# Patient Record
Sex: FEMALE | Race: WHITE | NOT HISPANIC OR LATINO | Employment: FULL TIME | URBAN - METROPOLITAN AREA
[De-identification: names, ages, dates, MRNs, and addresses within clinical notes are randomized per-mention and may not be internally consistent; named-entity substitution may affect disease eponyms.]

---

## 2017-04-03 RX ORDER — ATENOLOL 50 MG/1
50 TABLET ORAL DAILY
Qty: 90 TABLET | Refills: 1 | Status: SHIPPED | OUTPATIENT
Start: 2017-04-03 | End: 2018-01-15

## 2017-05-30 ENCOUNTER — TELEPHONE (OUTPATIENT)
Dept: ENDOCRINOLOGY | Facility: CLINIC | Age: 27
End: 2017-05-30

## 2017-06-30 ENCOUNTER — OFFICE VISIT (OUTPATIENT)
Dept: ENDOCRINOLOGY | Facility: CLINIC | Age: 27
End: 2017-06-30

## 2017-06-30 VITALS
BODY MASS INDEX: 27.05 KG/M2 | HEIGHT: 62 IN | SYSTOLIC BLOOD PRESSURE: 108 MMHG | HEART RATE: 56 BPM | OXYGEN SATURATION: 99 % | WEIGHT: 147 LBS | DIASTOLIC BLOOD PRESSURE: 64 MMHG

## 2017-06-30 DIAGNOSIS — E05.90 HYPERTHYROIDISM: Primary | ICD-10-CM

## 2017-06-30 DIAGNOSIS — D70.2 OTHER DRUG-INDUCED NEUTROPENIA (HCC): ICD-10-CM

## 2017-06-30 PROCEDURE — 99213 OFFICE O/P EST LOW 20 MIN: CPT | Performed by: INTERNAL MEDICINE

## 2017-06-30 NOTE — PROGRESS NOTES
Becky Naqvi 27 y.o.  CC:Follow Up; Hyperthyroidism   [place]: Follow-up (hyperthyroidism, leukopenia, anemia)    Reviewed lab work- t4 0.89  tsh 0.05  Low white cell count previously- today 5.3 with 50% neutrophils   cmp normal   Planning preg - discussed stopping medication if considering conception     Allergies   Allergen Reactions   • Oxycodone-Acetaminophen    • Propylthiouracil      Neutropenia       Current Outpatient Prescriptions:   •  atenolol (TENORMIN) 50 MG tablet, Take 1 tablet by mouth Daily., Disp: 90 tablet, Rfl: 1  •  methimazole (TAPAZOLE) 5 MG tablet, Take 1 tablet twice daily, Disp: 180 tablet, Rfl: 1  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]     Overview Note:     Impression: 12/08/2015 - check tfts  Impression: 09/15/2015 - stable exam  Impression: 07/17/2015 - check tsi  Impression: 05/29/2015 - check TSI  Impression: 04/16/2015 - check tsi   discussed options for treatment   will continue with methimazole katya;      • Hyperthyroidism [E05.90]     Overview Note:     Impression: 12/08/2015 - get tfts  Impression: 09/15/2015 - improving tsh, low t3 and t4  cbc, and cmp are normal  continue 2.5 mg of methimazole daily   repeat tsh in 4-6 weeks - gave order  reviewed lab work from Dr Doss  Impression: 07/17/2015 - check all levels   discussed therapy at 14 weeks  Impression: 06/12/2015 - check tfts, ab and hcg  Impression: 05/29/2015 - update cbc, cmp, tfts, tsi (baseline)  Impression: 04/16/2015 - check free t4, free t3 and tsh, tsi  Impression: 12/30/2014 - check thyroid hormone levels, repeat tsh and check thyroid antibodies  discussed ddx   discussed treatment options for graves dz  await blood work - use ptu in light of poss  fertility;      • Pain in joint [M25.50]     Overview Note:     Impression: 12/30/2014 - diffuse, intermittent, with am stiffness  check darcy, rf and esr;      • Leukopenia [D72.819]     Overview Note:     Impression: 12/08/2015 - check cbc  Impression: 05/29/2015 - assoc with anti thyroid medications  Impression: 04/16/2015 - check cbc- so far tolerating well;      • Infectious diarrhea [A09]     Overview Note:     Impression: 03/03/2016 - no s/p antbiotics, has colonoscopy set up    no further diarrhea;      • Back pain [M54.9]   • Seasonal allergic rhinitis [J30.2]     Review of Systems   Constitutional: Negative for activity change, appetite change, chills, diaphoresis, fatigue, fever and unexpected weight change.   HENT: Negative for congestion, dental problem, drooling, ear discharge, ear pain, facial swelling, hearing loss, mouth sores, nosebleeds, postnasal drip, rhinorrhea, sinus pressure, sneezing, sore throat, tinnitus, trouble swallowing and voice change.    Eyes: Negative for photophobia, pain, discharge, redness, itching and visual disturbance.   Respiratory: Negative for apnea, cough, choking, chest tightness, shortness of breath, wheezing and stridor.    Cardiovascular: Negative for chest pain, palpitations and leg swelling.   Gastrointestinal: Negative for abdominal distention, abdominal pain, anal bleeding, blood in stool, constipation, diarrhea, nausea, rectal pain and vomiting.   Endocrine: Negative for cold intolerance, heat intolerance, polydipsia, polyphagia and polyuria.   Genitourinary: Negative for decreased urine volume, difficulty urinating, dysuria, enuresis, flank pain, frequency, genital sores, hematuria and urgency.   Musculoskeletal: Negative for arthralgias, back pain, gait problem, joint swelling, myalgias, neck pain and neck stiffness.   Skin: Negative for color change, pallor, rash and wound.   Allergic/Immunologic: Negative for environmental allergies, food allergies and  "immunocompromised state.   Neurological: Negative for dizziness, tremors, seizures, syncope, facial asymmetry, speech difficulty, weakness, light-headedness, numbness and headaches.   Hematological: Negative for adenopathy. Does not bruise/bleed easily.   Psychiatric/Behavioral: Negative for agitation, behavioral problems, confusion, decreased concentration, dysphoric mood, hallucinations, self-injury, sleep disturbance and suicidal ideas. The patient is not nervous/anxious and is not hyperactive.      Social History     Social History   • Marital status:      Spouse name: N/A   • Number of children: N/A   • Years of education: N/A     Occupational History   • Not on file.     Social History Main Topics   • Smoking status: Never Smoker   • Smokeless tobacco: Never Used   • Alcohol use Yes      Comment: occasionally   • Drug use: No   • Sexual activity: Defer     Other Topics Concern   • Not on file     Social History Narrative     Family History   Problem Relation Age of Onset   • Arthritis Other    • Hyperlipidemia Other    • Thyroid disease Other    • Heart attack Other    • Hypertension Other    • Lung cancer Other    • Lung cancer Other    • Hypertension Father    • Lung cancer Paternal Uncle    • Heart attack Maternal Grandmother    • Lung cancer Paternal Grandmother      /64  Pulse 56  Ht 62\" (157.5 cm)  Wt 147 lb (66.7 kg)  SpO2 99%  BMI 26.89 kg/m2  Physical Exam   Constitutional: She is oriented to person, place, and time. She appears well-developed and well-nourished.   HENT:   Head: Normocephalic and atraumatic.   Nose: Nose normal.   Mouth/Throat: Oropharynx is clear and moist.   Eyes: Conjunctivae, EOM and lids are normal. Pupils are equal, round, and reactive to light.   Neck: Trachea normal and normal range of motion. Neck supple. Carotid bruit is not present. No tracheal deviation present. No thyroid mass and no thyromegaly (small goiter without dom nodule) present. "   Cardiovascular: Normal rate, regular rhythm, normal heart sounds and intact distal pulses.  Exam reveals no gallop and no friction rub.    No murmur heard.  Pulmonary/Chest: Effort normal and breath sounds normal. No respiratory distress. She has no wheezes.   Musculoskeletal: Normal range of motion. She exhibits no edema or deformity.   Lymphadenopathy:     She has no cervical adenopathy.   Neurological: She is alert and oriented to person, place, and time. She has normal reflexes. She displays normal reflexes. No cranial nerve deficit.   Skin: Skin is warm and dry. No rash noted. No cyanosis or erythema. Nails show no clubbing.   Psychiatric: She has a normal mood and affect. Her speech is normal and behavior is normal. Judgment and thought content normal. Cognition and memory are normal.   Nursing note and vitals reviewed.    Results for orders placed or performed during the hospital encounter of 03/03/16   T4, free   Result Value Ref Range    Free T4 0.88 (L) 0.89 - 1.76 ng/dL   TSH   Result Value Ref Range    TSH 0.272 (L) 0.350 - 5.350 UIU/mL   Comprehensive metabolic panel   Result Value Ref Range    Glucose 83 70 - 100 mg/dL    BUN 9 9 - 23 mg/dL    Creatinine 0.8 0.6 - 1.3 mg/dL    Sodium 140 132 - 146 mmol/L    Potassium 4.6 3.5 - 5.5 mmol/L    Chloride 107 99 - 109 mmol/L    CO2 31 20 - 31 mmol/L    Calcium 9.3 8.7 - 10.4 mg/dL    Alkaline Phosphatase 80 25 - 100 Units/L    AST (SGOT) 24 0 - 33 Units/L    ALT (SGPT) 27 7 - 40 Units/L    Total Bilirubin 0.5 0.3 - 1.2 mg/dL    Total Protein 7.2 5.7 - 8.2 g/dL    Albumin 4.3 3.2 - 4.8 g/dL    eGFR 99 ml/min/1.732    Anion Gap 2 (L) 3 - 11 mmol/L   Thyroid stimulating immunoglobulin   Result Value Ref Range    Thyroid Stimulating Immunoglobulin 33 0 - 139 %   CBC and Differential   Result Value Ref Range    WBC 4.97 3.50 - 10.80 K/mcL    RBC 4.36 3.89 - 5.14 M/mcL    Hemoglobin 11.9 11.5 - 15.5 g/dL    Hematocrit 36.6 34.5 - 44.0 %    MCV 83.9 80.0 - 99.0  fL    MCH 27.3 27.0 - 31.0 pg    MCHC 32.5 32.0 - 36.0 g/dL    RDW-CV 13.2 11.3 - 14.5 %    Platelets 238 150 - 450 K/mcL    Neutrophils Absolute 2.60 1.50 - 8.30 K/mcL    Lymphocytes Absolute 1.69 0.60 - 4.80 K/mcL    Monocytes Absolute 0.47 0.00 - 1.00 K/mcL    Eosinophils Absolute 0.16 0.10 - 0.30 K/mcL    Basophils Absolute 0.04 0.00 - 0.20 K/mcL    Neutrophil Rel % 52.3 41.0 - 71.0 %    Lymphocyte Rel % 34.0 24.0 - 44.0 %    Monocyte Rel % 9.5 0.0 - 12.0 %    Eosinophil Rel % 3.2 (H) 0.0 - 3.0 %    Basophil Rel % 0.8 0.0 - 1.0 %    Immature Granulocyte Rel % 0.2 0.0 - 0.6 %     Problem List Items Addressed This Visit        Endocrine    Hyperthyroidism - Primary     Reviewed lab work - t4 0.89 and tsh 0.08  No change   t4 is down from 1.11 previously  Discussed with her   Continue methimazole 10 mg daily  lfts and white cell counts are stable            Immune and Lymphatic    Leukopenia     Reviewed cbc - outside lab work   No change for now              Discussed holding medication 2 weeks prior to period every month, ok to restart methimazole once period starts up to ovulation/ mid month  F/u lab work 8 weeks   F/u here 6 months    Tawana Causey  Signed Marni Pan MD

## 2017-07-27 ENCOUNTER — TELEPHONE (OUTPATIENT)
Dept: INTERNAL MEDICINE | Facility: CLINIC | Age: 27
End: 2017-07-27

## 2017-07-27 NOTE — TELEPHONE ENCOUNTER
DALI,    MS EASLEY CALLED STATING THAT SHE WILL NOT BE ABLE TO COME IN FOR HER APPT 7/28 AT 11:00 AM. SHE WOULD LIKE A RETURN CALL FROM YOU TO SEE WHEN DR HAYES CAN GET HER IN. I DID NOT CANCEL THIS APPT PENDING THE OUTCOME OF YOUR CONVERSATION WITH HER. SHE IS TO SEE DR HAYES ONCE SHE HAD A POSITIVE PREGNANCY TEST.    [phone]

## 2017-07-28 ENCOUNTER — TELEPHONE (OUTPATIENT)
Dept: INTERNAL MEDICINE | Facility: CLINIC | Age: 27
End: 2017-07-28

## 2017-07-28 DIAGNOSIS — E05.90 HYPERTHYROIDISM: Primary | ICD-10-CM

## 2017-07-28 NOTE — TELEPHONE ENCOUNTER
PT CALLED AGAIN STATING SHE IS UNABLE TO MAKE IT TODAY FOR HER AN APPOINTMENT SHE WAS WANTING TO BE RESCHEDULED, SHE ALSO WANTED TO KNOW IF DR. HAYES JUST NEEDED LABS DONE OR IF IT WAS IMPORTANT SHE COME IN TODAY.

## 2017-07-28 NOTE — TELEPHONE ENCOUNTER
Order created for lab work - please have her call once she has this done.  Reschedule for 8 weeks.  Thanks, [facility]    Please advise on OV or labs?

## 2017-07-28 NOTE — TELEPHONE ENCOUNTER
Patient was advised with Dr Pan response and she states she will come into this office within the next week for the labs

## 2017-07-28 NOTE — TELEPHONE ENCOUNTER
Duplicate message  Pt was advised I do have a message into Dr Pan to ask if she needs OV or just labs due to pregnancy and advised pt I will call her as soon as I have a response  Pt voiced understanding

## 2017-08-08 LAB
T4 FREE SERPL-MCNC: 1.28 NG/DL (ref 0.89–1.76)
TSH SERPL DL<=0.05 MIU/L-ACNC: 0.2 MIU/ML (ref 0.35–5.35)

## 2017-08-08 PROCEDURE — 84439 ASSAY OF FREE THYROXINE: CPT | Performed by: INTERNAL MEDICINE

## 2017-08-08 PROCEDURE — 84445 ASSAY OF TSI GLOBULIN: CPT | Performed by: INTERNAL MEDICINE

## 2017-08-08 PROCEDURE — 84443 ASSAY THYROID STIM HORMONE: CPT | Performed by: INTERNAL MEDICINE

## 2017-08-08 PROCEDURE — 84481 FREE ASSAY (FT-3): CPT | Performed by: INTERNAL MEDICINE

## 2017-08-20 LAB
T3FREE SERPL-MCNC: 3.2 PG/ML (ref 2–4.4)
TSI ACT/NOR SER: 137 % (ref 0–139)

## 2017-10-17 ENCOUNTER — TELEPHONE (OUTPATIENT)
Dept: INTERNAL MEDICINE | Facility: CLINIC | Age: 27
End: 2017-10-17

## 2017-10-17 DIAGNOSIS — E05.90 HYPERTHYROIDISM: Primary | ICD-10-CM

## 2018-01-15 ENCOUNTER — OFFICE VISIT (OUTPATIENT)
Dept: ENDOCRINOLOGY | Facility: CLINIC | Age: 28
End: 2018-01-15

## 2018-01-15 VITALS
BODY MASS INDEX: 23.9 KG/M2 | OXYGEN SATURATION: 100 % | SYSTOLIC BLOOD PRESSURE: 112 MMHG | DIASTOLIC BLOOD PRESSURE: 50 MMHG | WEIGHT: 140 LBS | HEIGHT: 64 IN | HEART RATE: 82 BPM

## 2018-01-15 DIAGNOSIS — E05.90 HYPERTHYROIDISM: Primary | ICD-10-CM

## 2018-01-15 LAB
T4 FREE SERPL-MCNC: 0.99 NG/DL (ref 0.89–1.76)
TSH SERPL DL<=0.05 MIU/L-ACNC: 0.54 MIU/ML (ref 0.35–5.35)

## 2018-01-15 PROCEDURE — 84439 ASSAY OF FREE THYROXINE: CPT | Performed by: INTERNAL MEDICINE

## 2018-01-15 PROCEDURE — 99213 OFFICE O/P EST LOW 20 MIN: CPT | Performed by: INTERNAL MEDICINE

## 2018-01-15 PROCEDURE — 84445 ASSAY OF TSI GLOBULIN: CPT | Performed by: INTERNAL MEDICINE

## 2018-01-15 PROCEDURE — 84443 ASSAY THYROID STIM HORMONE: CPT | Performed by: INTERNAL MEDICINE

## 2018-01-15 NOTE — PROGRESS NOTES
Becky Naqvi 27 y.o.  CC:Follow-up and Hyperthyroidism (with pregnancy, SHEILA 3- OB:  Emma Carney MD)      [place]: Follow-up and Hyperthyroidism (with pregnancy, SHEILA 3- OB:  Emma Carney MD)    Doing well overall  Is not currently on medication for thyroid   Reviewed tsi levels which are slightly higher (tfts normal including t4 and t3)  Will need to follow closely pp     Allergies   Allergen Reactions   • Oxycodone-Acetaminophen    • Propylthiouracil      Neutropenia     No current outpatient prescriptions on file.  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]     Overview Note:     Impression: 12/08/2015 - check tfts  Impression: 09/15/2015 - stable exam  Impression: 07/17/2015 - check tsi  Impression: 05/29/2015 - check TSI  Impression: 04/16/2015 - check tsi   discussed options for treatment   will continue with methimazole dailly;      • Hyperthyroidism [E05.90]     Overview Note:     Impression: 12/08/2015 - get tfts  Impression: 09/15/2015 - improving tsh, low t3 and t4  cbc, and cmp are normal  continue 2.5 mg of methimazole daily   repeat tsh in 4-6 weeks - gave order  reviewed lab work from Dr Doss  Impression: 07/17/2015 - check all levels   discussed therapy at 14 weeks  Impression: 06/12/2015 - check tfts, ab and hcg  Impression: 05/29/2015 - update cbc, cmp, tfts, tsi (baseline)  Impression: 04/16/2015 - check free t4, free t3 and tsh, tsi  Impression: 12/30/2014 - check thyroid hormone levels, repeat tsh and check thyroid antibodies  discussed ddx   discussed treatment options for graves dz  await blood work - use ptu in light of poss fertility;      • Pain in joint [M25.50]     Overview Note:     Impression: 12/30/2014 - diffuse,  intermittent, with am stiffness  check darcy, rf and esr;      • Leukopenia [D72.819]     Overview Note:     Impression: 12/08/2015 - check cbc  Impression: 05/29/2015 - assoc with anti thyroid medications  Impression: 04/16/2015 - check cbc- so far tolerating well;      • Infectious diarrhea [A09]     Overview Note:     Impression: 03/03/2016 - no s/p antbiotics, has colonoscopy set up    no further diarrhea;      • Back pain [M54.9]   • Seasonal allergic rhinitis [J30.2]     Review of Systems   Constitutional: Negative for activity change, appetite change, chills, diaphoresis, fatigue, fever and unexpected weight change.   HENT: Negative for congestion, dental problem, drooling, ear discharge, ear pain, facial swelling, hearing loss, mouth sores, nosebleeds, postnasal drip, rhinorrhea, sinus pressure, sneezing, sore throat, tinnitus, trouble swallowing and voice change.    Eyes: Negative for photophobia, pain, discharge, redness, itching and visual disturbance.   Respiratory: Negative for apnea, cough, choking, chest tightness, shortness of breath, wheezing and stridor.    Cardiovascular: Negative for chest pain, palpitations and leg swelling.   Gastrointestinal: Negative for abdominal distention, abdominal pain, anal bleeding, blood in stool, constipation, diarrhea, nausea, rectal pain and vomiting.   Endocrine: Negative for cold intolerance, heat intolerance, polydipsia, polyphagia and polyuria.   Genitourinary: Negative for decreased urine volume, difficulty urinating, dysuria, enuresis, flank pain, frequency, genital sores, hematuria and urgency.   Musculoskeletal: Negative for arthralgias, back pain, gait problem, joint swelling, myalgias, neck pain and neck stiffness.   Skin: Negative for color change, pallor, rash and wound.   Allergic/Immunologic: Negative for environmental allergies, food allergies and immunocompromised state.   Neurological: Negative for dizziness, tremors, seizures, syncope, facial  "asymmetry, speech difficulty, weakness, light-headedness, numbness and headaches.   Hematological: Negative for adenopathy. Does not bruise/bleed easily.   Psychiatric/Behavioral: Negative for agitation, behavioral problems, confusion, decreased concentration, dysphoric mood, hallucinations, self-injury, sleep disturbance and suicidal ideas. The patient is not nervous/anxious and is not hyperactive.      Social History     Social History   • Marital status:      Spouse name: N/A   • Number of children: N/A   • Years of education: N/A     Occupational History   • Not on file.     Social History Main Topics   • Smoking status: Never Smoker   • Smokeless tobacco: Never Used   • Alcohol use Yes      Comment: occasionally   • Drug use: No   • Sexual activity: Defer     Other Topics Concern   • Not on file     Social History Narrative     Family History   Problem Relation Age of Onset   • Arthritis Other    • Hyperlipidemia Other    • Thyroid disease Other    • Heart attack Other    • Hypertension Other    • Lung cancer Other    • Lung cancer Other    • Hypertension Father    • Lung cancer Paternal Uncle    • Heart attack Maternal Grandmother    • Lung cancer Paternal Grandmother      /50  Pulse 82  Ht 162.6 cm (64\")  Wt 63.5 kg (140 lb)  SpO2 100%  BMI 24.03 kg/m2  Physical Exam   Constitutional: She is oriented to person, place, and time. She appears well-developed and well-nourished.   HENT:   Head: Normocephalic and atraumatic.   Nose: Nose normal.   Mouth/Throat: Oropharynx is clear and moist.   Eyes: Conjunctivae, EOM and lids are normal. Pupils are equal, round, and reactive to light.   Neck: Trachea normal and normal range of motion. Neck supple. Carotid bruit is not present. No tracheal deviation present. Thyromegaly (right sided prominence without dominant nodule ) present. No thyroid mass present.   Cardiovascular: Normal rate, regular rhythm, normal heart sounds and intact distal pulses.  " Exam reveals no gallop and no friction rub.    No murmur heard.  Pulmonary/Chest: Effort normal and breath sounds normal. No respiratory distress. She has no wheezes.   Musculoskeletal: Normal range of motion. She exhibits no edema or deformity.   Lymphadenopathy:     She has no cervical adenopathy.   Neurological: She is alert and oriented to person, place, and time. She has normal reflexes. She displays normal reflexes. No cranial nerve deficit.   Skin: Skin is warm and dry. No rash noted. No cyanosis or erythema. Nails show no clubbing.   Psychiatric: She has a normal mood and affect. Her speech is normal and behavior is normal. Judgment and thought content normal. Cognition and memory are normal.   Nursing note and vitals reviewed.    Results for orders placed or performed in visit on 07/28/17   TSH   Result Value Ref Range    TSH 0.203 (L) 0.350 - 5.350 mIU/mL   T4, Free   Result Value Ref Range    Free T4 1.28 0.89 - 1.76 ng/dL   T3, Free   Result Value Ref Range    T3, Free 3.2 2.0 - 4.4 pg/mL   Thyroid Stimulating Immunoglobulin   Result Value Ref Range    Thyroid Stimulating Immunoglobulin 137 0 - 139 %     Problem List Items Addressed This Visit        Endocrine    Hyperthyroidism - Primary     Update tfts   Check tsi   F/u 8 weeks pp          Relevant Orders    TSH    T4, Free    Thyroid Stimulating Immunoglobulin        Return in about 16 weeks (around 5/7/2018) for Recheck 30 min .    Jumana Noguera MA  Signed Marni Pan MD

## 2018-01-23 LAB — TSI ACT/NOR SER: 108 % (ref 0–139)

## 2018-03-02 ENCOUNTER — TRANSCRIBE ORDERS (OUTPATIENT)
Dept: LAB | Facility: HOSPITAL | Age: 28
End: 2018-03-02

## 2018-03-02 ENCOUNTER — APPOINTMENT (OUTPATIENT)
Dept: LAB | Facility: HOSPITAL | Age: 28
End: 2018-03-02

## 2018-03-02 DIAGNOSIS — Z34.83 PRENATAL CARE, SUBSEQUENT PREGNANCY, THIRD TRIMESTER: Primary | ICD-10-CM

## 2018-03-02 PROCEDURE — 87081 CULTURE SCREEN ONLY: CPT | Performed by: OBSTETRICS & GYNECOLOGY

## 2018-03-05 LAB — BACTERIA SPEC AEROBE CULT: NORMAL

## 2018-03-22 ENCOUNTER — PREP FOR SURGERY (OUTPATIENT)
Dept: OTHER | Facility: HOSPITAL | Age: 28
End: 2018-03-22

## 2018-03-22 ENCOUNTER — ANESTHESIA (OUTPATIENT)
Dept: LABOR AND DELIVERY | Facility: HOSPITAL | Age: 28
End: 2018-03-22

## 2018-03-22 ENCOUNTER — ANESTHESIA EVENT (OUTPATIENT)
Dept: LABOR AND DELIVERY | Facility: HOSPITAL | Age: 28
End: 2018-03-22

## 2018-03-22 ENCOUNTER — HOSPITAL ENCOUNTER (INPATIENT)
Facility: HOSPITAL | Age: 28
LOS: 3 days | Discharge: HOME | End: 2018-03-25
Attending: OBSTETRICS & GYNECOLOGY | Admitting: OBSTETRICS & GYNECOLOGY

## 2018-03-22 DIAGNOSIS — Z3A.39 39 WEEKS GESTATION OF PREGNANCY: Primary | ICD-10-CM

## 2018-03-22 DIAGNOSIS — Z3A.39 39 WEEKS GESTATION OF PREGNANCY: ICD-10-CM

## 2018-03-22 PROBLEM — Z34.90 PREGNANCY: Status: ACTIVE | Noted: 2018-03-22

## 2018-03-22 LAB
ABO GROUP BLD: NORMAL
ATMOSPHERIC PRESS: ABNORMAL MMHG
ATMOSPHERIC PRESS: ABNORMAL MMHG
BASE EXCESS BLDCOA CALC-SCNC: -3.8 MMOL/L (ref 0–2)
BASE EXCESS BLDCOV CALC-SCNC: -3 MMOL/L (ref 0–2)
BDY SITE: ABNORMAL
BLD GP AB SCN SERPL QL: NEGATIVE
CO2 BLDA-SCNC: 22 MMOL/L (ref 22–33)
CO2 BLDA-SCNC: 22.7 MMOL/L (ref 22–33)
DEPRECATED RDW RBC AUTO: 42.3 FL (ref 37–54)
ERYTHROCYTE [DISTWIDTH] IN BLOOD BY AUTOMATED COUNT: 12.8 % (ref 11.3–14.5)
HCO3 BLDCOA-SCNC: 20.9 MMOL/L (ref 16.9–20.5)
HCO3 BLDCOV-SCNC: 21.6 MMOL/L (ref 18.6–21.4)
HCT VFR BLD AUTO: 39.3 % (ref 34.5–44)
HGB BLD-MCNC: 13.3 G/DL (ref 11.5–15.5)
HGB BLDA-MCNC: 16.7 G/DL (ref 14–18)
HGB BLDA-MCNC: 17.7 G/DL (ref 14–18)
HOROWITZ INDEX BLD+IHG-RTO: 21 %
HOROWITZ INDEX BLD+IHG-RTO: 21 %
MCH RBC QN AUTO: 31 PG (ref 27–31)
MCHC RBC AUTO-ENTMCNC: 33.8 G/DL (ref 32–36)
MCV RBC AUTO: 91.6 FL (ref 80–99)
MODALITY: ABNORMAL
MODALITY: ABNORMAL
PCO2 BLDCOA: 36.6 MMHG (ref 43.3–54.9)
PCO2 BLDCOV: 36.7 MM HG (ref 30–60)
PH BLDCOA: 7.37 PH UNITS (ref 7.2–7.3)
PH BLDCOV: 7.38 PH UNITS (ref 7.19–7.46)
PLATELET # BLD AUTO: 156 10*3/MM3 (ref 150–450)
PMV BLD AUTO: 12 FL (ref 6–12)
PO2 BLDCOA: 27.2 MMHG (ref 11.5–43.3)
PO2 BLDCOV: 24.2 MM HG
POC AMNISURE: NEGATIVE
RBC # BLD AUTO: 4.29 10*6/MM3 (ref 3.89–5.14)
RH BLD: POSITIVE
SAO2 % BLDCOA: 64.5 %
SAO2 % BLDCOA: ABNORMAL % (ref 45–75)
SAO2 % BLDCOV: 58.7 %
WBC NRBC COR # BLD: 6.99 10*3/MM3 (ref 3.5–10.8)

## 2018-03-22 PROCEDURE — 25010000002 FENTANYL CITRATE (PF) 100 MCG/2ML SOLUTION: Performed by: ANESTHESIOLOGY

## 2018-03-22 PROCEDURE — 85027 COMPLETE CBC AUTOMATED: CPT | Performed by: NURSE PRACTITIONER

## 2018-03-22 PROCEDURE — 84112 EVAL AMNIOTIC FLUID PROTEIN: CPT | Performed by: OBSTETRICS & GYNECOLOGY

## 2018-03-22 PROCEDURE — 82805 BLOOD GASES W/O2 SATURATION: CPT | Performed by: OBSTETRICS & GYNECOLOGY

## 2018-03-22 PROCEDURE — 86900 BLOOD TYPING SEROLOGIC ABO: CPT | Performed by: NURSE PRACTITIONER

## 2018-03-22 PROCEDURE — 86850 RBC ANTIBODY SCREEN: CPT | Performed by: NURSE PRACTITIONER

## 2018-03-22 PROCEDURE — 86901 BLOOD TYPING SEROLOGIC RH(D): CPT | Performed by: NURSE PRACTITIONER

## 2018-03-22 PROCEDURE — 25010000002 MIDAZOLAM PER 1 MG: Performed by: ANESTHESIOLOGY

## 2018-03-22 PROCEDURE — 59514 CESAREAN DELIVERY ONLY: CPT | Performed by: OBSTETRICS & GYNECOLOGY

## 2018-03-22 PROCEDURE — 59025 FETAL NON-STRESS TEST: CPT

## 2018-03-22 PROCEDURE — C1765 ADHESION BARRIER: HCPCS | Performed by: OBSTETRICS & GYNECOLOGY

## 2018-03-22 PROCEDURE — 25010000003 MORPHINE PER 10 MG: Performed by: ANESTHESIOLOGY

## 2018-03-22 RX ORDER — METHYLERGONOVINE MALEATE 0.2 MG/ML
200 INJECTION INTRAVENOUS ONCE
Status: DISCONTINUED | OUTPATIENT
Start: 2018-03-22 | End: 2018-03-25 | Stop reason: HOSPADM

## 2018-03-22 RX ORDER — HYDROCODONE BITARTRATE AND ACETAMINOPHEN 5; 325 MG/1; MG/1
2 TABLET ORAL EVERY 4 HOURS PRN
Status: DISCONTINUED | OUTPATIENT
Start: 2018-03-22 | End: 2018-03-25 | Stop reason: HOSPADM

## 2018-03-22 RX ORDER — SODIUM CHLORIDE, SODIUM LACTATE, POTASSIUM CHLORIDE, CALCIUM CHLORIDE 600; 310; 30; 20 MG/100ML; MG/100ML; MG/100ML; MG/100ML
125 INJECTION, SOLUTION INTRAVENOUS CONTINUOUS
Status: DISCONTINUED | OUTPATIENT
Start: 2018-03-22 | End: 2018-03-22 | Stop reason: HOSPADM

## 2018-03-22 RX ORDER — CARBOPROST TROMETHAMINE 250 UG/ML
250 INJECTION, SOLUTION INTRAMUSCULAR ONCE
Status: DISCONTINUED | OUTPATIENT
Start: 2018-03-22 | End: 2018-03-25 | Stop reason: HOSPADM

## 2018-03-22 RX ORDER — NALOXONE HCL 0.4 MG/ML
0.4 VIAL (ML) INJECTION
Status: ACTIVE | OUTPATIENT
Start: 2018-03-22 | End: 2018-03-23

## 2018-03-22 RX ORDER — FENTANYL CITRATE 50 UG/ML
INJECTION, SOLUTION INTRAMUSCULAR; INTRAVENOUS AS NEEDED
Status: DISCONTINUED | OUTPATIENT
Start: 2018-03-22 | End: 2018-03-22 | Stop reason: SURG

## 2018-03-22 RX ORDER — IBUPROFEN 600 MG/1
600 TABLET ORAL ONCE AS NEEDED
Status: DISCONTINUED | OUTPATIENT
Start: 2018-03-22 | End: 2018-03-22 | Stop reason: HOSPADM

## 2018-03-22 RX ORDER — IBUPROFEN 600 MG/1
600 TABLET ORAL EVERY 6 HOURS PRN
Status: DISCONTINUED | OUTPATIENT
Start: 2018-03-22 | End: 2018-03-25 | Stop reason: HOSPADM

## 2018-03-22 RX ORDER — METOCLOPRAMIDE HYDROCHLORIDE 5 MG/ML
10 INJECTION INTRAMUSCULAR; INTRAVENOUS ONCE AS NEEDED
Status: DISCONTINUED | OUTPATIENT
Start: 2018-03-22 | End: 2018-03-22 | Stop reason: HOSPADM

## 2018-03-22 RX ORDER — MIDAZOLAM HYDROCHLORIDE 1 MG/ML
INJECTION INTRAMUSCULAR; INTRAVENOUS AS NEEDED
Status: DISCONTINUED | OUTPATIENT
Start: 2018-03-22 | End: 2018-03-22 | Stop reason: SURG

## 2018-03-22 RX ORDER — MAGNESIUM CARB/ALUMINUM HYDROX 105-160MG
30 TABLET,CHEWABLE ORAL ONCE
Status: DISCONTINUED | OUTPATIENT
Start: 2018-03-22 | End: 2018-03-22 | Stop reason: HOSPADM

## 2018-03-22 RX ORDER — METHYLERGONOVINE MALEATE 0.2 MG/ML
200 INJECTION INTRAVENOUS ONCE AS NEEDED
Status: DISCONTINUED | OUTPATIENT
Start: 2018-03-22 | End: 2018-03-22 | Stop reason: HOSPADM

## 2018-03-22 RX ORDER — NALOXONE HCL 0.4 MG/ML
0.4 VIAL (ML) INJECTION
Status: DISCONTINUED | OUTPATIENT
Start: 2018-03-22 | End: 2018-03-25 | Stop reason: HOSPADM

## 2018-03-22 RX ORDER — PRENATAL WITH FERROUS FUM AND FOLIC ACID 3080; 920; 120; 400; 22; 1.84; 3; 20; 10; 1; 12; 200; 27; 25; 2 [IU]/1; [IU]/1; MG/1; [IU]/1; MG/1; MG/1; MG/1; MG/1; MG/1; MG/1; UG/1; MG/1; MG/1; MG/1; MG/1
1 TABLET ORAL DAILY
COMMUNITY
End: 2018-08-27 | Stop reason: SDUPTHER

## 2018-03-22 RX ORDER — SODIUM CHLORIDE 0.9 % (FLUSH) 0.9 %
1-10 SYRINGE (ML) INJECTION AS NEEDED
Status: CANCELLED | OUTPATIENT
Start: 2018-03-22

## 2018-03-22 RX ORDER — FAMOTIDINE 10 MG/ML
INJECTION, SOLUTION INTRAVENOUS AS NEEDED
Status: DISCONTINUED | OUTPATIENT
Start: 2018-03-22 | End: 2018-03-22 | Stop reason: SURG

## 2018-03-22 RX ORDER — MAGNESIUM CARB/ALUMINUM HYDROX 105-160MG
30 TABLET,CHEWABLE ORAL ONCE
Status: CANCELLED | OUTPATIENT
Start: 2018-03-22 | End: 2018-03-22

## 2018-03-22 RX ORDER — ONDANSETRON 2 MG/ML
4 INJECTION INTRAMUSCULAR; INTRAVENOUS ONCE
Status: DISCONTINUED | OUTPATIENT
Start: 2018-03-22 | End: 2018-03-22 | Stop reason: HOSPADM

## 2018-03-22 RX ORDER — CARBOPROST TROMETHAMINE 250 UG/ML
250 INJECTION, SOLUTION INTRAMUSCULAR AS NEEDED
Status: DISCONTINUED | OUTPATIENT
Start: 2018-03-22 | End: 2018-03-22 | Stop reason: HOSPADM

## 2018-03-22 RX ORDER — OXYTOCIN/RINGER'S LACTATE 20/1000 ML
999 PLASTIC BAG, INJECTION (ML) INTRAVENOUS ONCE
Status: CANCELLED | OUTPATIENT
Start: 2018-03-22 | End: 2018-03-22

## 2018-03-22 RX ORDER — MISOPROSTOL 200 UG/1
800 TABLET ORAL AS NEEDED
Status: DISCONTINUED | OUTPATIENT
Start: 2018-03-22 | End: 2018-03-22 | Stop reason: HOSPADM

## 2018-03-22 RX ORDER — MISOPROSTOL 200 UG/1
800 TABLET ORAL AS NEEDED
Status: CANCELLED | OUTPATIENT
Start: 2018-03-22

## 2018-03-22 RX ORDER — LANOLIN 100 %
OINTMENT (GRAM) TOPICAL
Status: DISCONTINUED | OUTPATIENT
Start: 2018-03-22 | End: 2018-03-25 | Stop reason: HOSPADM

## 2018-03-22 RX ORDER — PROMETHAZINE HYDROCHLORIDE 25 MG/1
25 TABLET ORAL EVERY 6 HOURS PRN
Status: DISCONTINUED | OUTPATIENT
Start: 2018-03-22 | End: 2018-03-25 | Stop reason: HOSPADM

## 2018-03-22 RX ORDER — SODIUM CHLORIDE 0.9 % (FLUSH) 0.9 %
1-10 SYRINGE (ML) INJECTION AS NEEDED
Status: DISCONTINUED | OUTPATIENT
Start: 2018-03-22 | End: 2018-03-22 | Stop reason: HOSPADM

## 2018-03-22 RX ORDER — PROMETHAZINE HYDROCHLORIDE 12.5 MG/1
12.5 SUPPOSITORY RECTAL EVERY 6 HOURS PRN
Status: DISCONTINUED | OUTPATIENT
Start: 2018-03-22 | End: 2018-03-25 | Stop reason: HOSPADM

## 2018-03-22 RX ORDER — MORPHINE SULFATE 2 MG/ML
2 INJECTION, SOLUTION INTRAMUSCULAR; INTRAVENOUS EVERY 4 HOURS PRN
Status: DISCONTINUED | OUTPATIENT
Start: 2018-03-22 | End: 2018-03-22

## 2018-03-22 RX ORDER — PROMETHAZINE HYDROCHLORIDE 25 MG/ML
12.5 INJECTION, SOLUTION INTRAMUSCULAR; INTRAVENOUS EVERY 6 HOURS PRN
Status: DISCONTINUED | OUTPATIENT
Start: 2018-03-22 | End: 2018-03-25 | Stop reason: HOSPADM

## 2018-03-22 RX ORDER — CARBOPROST TROMETHAMINE 250 UG/ML
250 INJECTION, SOLUTION INTRAMUSCULAR AS NEEDED
Status: CANCELLED | OUTPATIENT
Start: 2018-03-22

## 2018-03-22 RX ORDER — OXYTOCIN 10 [USP'U]/ML
INJECTION, SOLUTION INTRAMUSCULAR; INTRAVENOUS AS NEEDED
Status: DISCONTINUED | OUTPATIENT
Start: 2018-03-22 | End: 2018-03-22 | Stop reason: SURG

## 2018-03-22 RX ORDER — SODIUM CHLORIDE, SODIUM LACTATE, POTASSIUM CHLORIDE, CALCIUM CHLORIDE 600; 310; 30; 20 MG/100ML; MG/100ML; MG/100ML; MG/100ML
125 INJECTION, SOLUTION INTRAVENOUS CONTINUOUS
Status: CANCELLED | OUTPATIENT
Start: 2018-03-22

## 2018-03-22 RX ORDER — DOCUSATE SODIUM 100 MG/1
100 CAPSULE, LIQUID FILLED ORAL 2 TIMES DAILY PRN
Status: DISCONTINUED | OUTPATIENT
Start: 2018-03-22 | End: 2018-03-25 | Stop reason: HOSPADM

## 2018-03-22 RX ORDER — OXYTOCIN/RINGER'S LACTATE 20/1000 ML
999 PLASTIC BAG, INJECTION (ML) INTRAVENOUS ONCE
Status: COMPLETED | OUTPATIENT
Start: 2018-03-22 | End: 2018-03-22

## 2018-03-22 RX ORDER — MISOPROSTOL 200 UG/1
800 TABLET ORAL AS NEEDED
Status: DISCONTINUED | OUTPATIENT
Start: 2018-03-22 | End: 2018-03-25 | Stop reason: HOSPADM

## 2018-03-22 RX ORDER — OXYTOCIN/RINGER'S LACTATE 20/1000 ML
125 PLASTIC BAG, INJECTION (ML) INTRAVENOUS CONTINUOUS PRN
Status: CANCELLED | OUTPATIENT
Start: 2018-03-22

## 2018-03-22 RX ORDER — SIMETHICONE 80 MG
80 TABLET,CHEWABLE ORAL 4 TIMES DAILY PRN
Status: DISCONTINUED | OUTPATIENT
Start: 2018-03-22 | End: 2018-03-25 | Stop reason: HOSPADM

## 2018-03-22 RX ORDER — BUPIVACAINE HYDROCHLORIDE 7.5 MG/ML
INJECTION, SOLUTION EPIDURAL; RETROBULBAR AS NEEDED
Status: DISCONTINUED | OUTPATIENT
Start: 2018-03-22 | End: 2018-03-22 | Stop reason: SURG

## 2018-03-22 RX ORDER — METHYLERGONOVINE MALEATE 0.2 MG/ML
200 INJECTION INTRAVENOUS ONCE AS NEEDED
Status: CANCELLED | OUTPATIENT
Start: 2018-03-22

## 2018-03-22 RX ORDER — MORPHINE SULFATE 0.5 MG/ML
INJECTION, SOLUTION EPIDURAL; INTRATHECAL; INTRAVENOUS AS NEEDED
Status: DISCONTINUED | OUTPATIENT
Start: 2018-03-22 | End: 2018-03-22 | Stop reason: SURG

## 2018-03-22 RX ORDER — OXYTOCIN/RINGER'S LACTATE 20/1000 ML
125 PLASTIC BAG, INJECTION (ML) INTRAVENOUS CONTINUOUS PRN
Status: DISCONTINUED | OUTPATIENT
Start: 2018-03-22 | End: 2018-03-22 | Stop reason: HOSPADM

## 2018-03-22 RX ADMIN — SODIUM CHLORIDE, POTASSIUM CHLORIDE, SODIUM LACTATE AND CALCIUM CHLORIDE 125 ML/HR: 600; 310; 30; 20 INJECTION, SOLUTION INTRAVENOUS at 11:44

## 2018-03-22 RX ADMIN — BUPIVACAINE HYDROCHLORIDE 1.4 ML: 7.5 INJECTION, SOLUTION EPIDURAL; RETROBULBAR at 19:59

## 2018-03-22 RX ADMIN — Medication: at 22:45

## 2018-03-22 RX ADMIN — MIDAZOLAM HYDROCHLORIDE 1 MG: 1 INJECTION, SOLUTION INTRAMUSCULAR; INTRAVENOUS at 20:24

## 2018-03-22 RX ADMIN — SODIUM CHLORIDE, POTASSIUM CHLORIDE, SODIUM LACTATE AND CALCIUM CHLORIDE: 600; 310; 30; 20 INJECTION, SOLUTION INTRAVENOUS at 19:53

## 2018-03-22 RX ADMIN — OXYTOCIN 30 UNITS: 10 INJECTION, SOLUTION INTRAMUSCULAR; INTRAVENOUS at 20:34

## 2018-03-22 RX ADMIN — MIDAZOLAM HYDROCHLORIDE 1 MG: 1 INJECTION, SOLUTION INTRAMUSCULAR; INTRAVENOUS at 19:54

## 2018-03-22 RX ADMIN — MIDAZOLAM HYDROCHLORIDE 1 MG: 1 INJECTION, SOLUTION INTRAMUSCULAR; INTRAVENOUS at 20:22

## 2018-03-22 RX ADMIN — MIDAZOLAM HYDROCHLORIDE 1 MG: 1 INJECTION, SOLUTION INTRAMUSCULAR; INTRAVENOUS at 20:15

## 2018-03-22 RX ADMIN — FAMOTIDINE 20 MG: 10 INJECTION INTRAVENOUS at 15:44

## 2018-03-22 RX ADMIN — SODIUM CHLORIDE, POTASSIUM CHLORIDE, SODIUM LACTATE AND CALCIUM CHLORIDE 1000 ML: 600; 310; 30; 20 INJECTION, SOLUTION INTRAVENOUS at 19:45

## 2018-03-22 RX ADMIN — MIDAZOLAM HYDROCHLORIDE 1 MG: 1 INJECTION, SOLUTION INTRAMUSCULAR; INTRAVENOUS at 20:30

## 2018-03-22 RX ADMIN — SODIUM CHLORIDE, POTASSIUM CHLORIDE, SODIUM LACTATE AND CALCIUM CHLORIDE 125 ML/HR: 600; 310; 30; 20 INJECTION, SOLUTION INTRAVENOUS at 15:13

## 2018-03-22 RX ADMIN — MORPHINE SULFATE 0.1 MG: 0.5 INJECTION, SOLUTION EPIDURAL; INTRATHECAL; INTRAVENOUS at 19:59

## 2018-03-22 RX ADMIN — HYDROCODONE BITARTRATE AND ACETAMINOPHEN 2 TABLET: 5; 325 TABLET ORAL at 22:33

## 2018-03-22 RX ADMIN — SODIUM CHLORIDE, POTASSIUM CHLORIDE, SODIUM LACTATE AND CALCIUM CHLORIDE: 600; 310; 30; 20 INJECTION, SOLUTION INTRAVENOUS at 20:34

## 2018-03-22 RX ADMIN — Medication 999 ML/HR: at 20:20

## 2018-03-22 RX ADMIN — IBUPROFEN 600 MG: 600 TABLET ORAL at 22:33

## 2018-03-22 RX ADMIN — FENTANYL CITRATE 15 MCG: 50 INJECTION, SOLUTION INTRAMUSCULAR; INTRAVENOUS at 19:59

## 2018-03-22 RX ADMIN — OXYTOCIN 30 UNITS: 10 INJECTION, SOLUTION INTRAMUSCULAR; INTRAVENOUS at 20:15

## 2018-03-22 RX ADMIN — Medication 125 ML/HR: at 20:40

## 2018-03-22 NOTE — PROGRESS NOTES
Amnisure negative.  Discussed with Becky and her  and will proceed with  section for oligohydramnios @ 1700.    Baby reactive Cat 1.

## 2018-03-22 NOTE — PLAN OF CARE
Problem:  Delivery (Adult,Obstetrics,Pediatric)  Goal: Signs and Symptoms of Listed Potential Problems Will be Absent, Minimized or Managed ( Delivery)  Outcome: Ongoing (interventions implemented as appropriate)   18 1232   Goal/Outcome Evaluation   Problems Assessed ( Delivery) all   Problems Present ( Delivery) none

## 2018-03-22 NOTE — ANESTHESIA PREPROCEDURE EVALUATION
Anesthesia Evaluation     Patient summary reviewed and Nursing notes reviewed   NPO Solid Status: > 8 hours  NPO Liquid Status: > 8 hours           Airway   Mallampati: II  TM distance: <3 FB  Neck ROM: full  No difficulty expected  Dental - normal exam     Pulmonary - negative pulmonary ROS and normal exam   Cardiovascular - negative cardio ROS and normal exam        Neuro/Psych- negative ROS  GI/Hepatic/Renal/Endo    (+)  GERD poorly controlled,  hyperthyroidism (no current meds)    Musculoskeletal     (+) back pain,   Abdominal  - normal exam   Substance History - negative use     OB/GYN    (+) Pregnant,         Other - negative ROS                     Anesthesia Plan    ASA 2     spinal and ITN     Anesthetic plan and risks discussed with patient and spouse/significant other.

## 2018-03-22 NOTE — PROGRESS NOTES
SROM confirmed  Patient  Is kandis but no longer wants .   Dr. Lobo taking over and will perform repeat C/S when OR available  Baby reassuring.

## 2018-03-23 LAB
BASOPHILS # BLD AUTO: 0.02 10*3/MM3 (ref 0–0.2)
BASOPHILS NFR BLD AUTO: 0.2 % (ref 0–1)
DEPRECATED RDW RBC AUTO: 42.6 FL (ref 37–54)
EOSINOPHIL # BLD AUTO: 0.05 10*3/MM3 (ref 0–0.3)
EOSINOPHIL NFR BLD AUTO: 0.6 % (ref 0–3)
ERYTHROCYTE [DISTWIDTH] IN BLOOD BY AUTOMATED COUNT: 12.7 % (ref 11.3–14.5)
HCT VFR BLD AUTO: 34.4 % (ref 34.5–44)
HGB BLD-MCNC: 11.6 G/DL (ref 11.5–15.5)
IMM GRANULOCYTES # BLD: 0.02 10*3/MM3 (ref 0–0.03)
IMM GRANULOCYTES NFR BLD: 0.2 % (ref 0–0.6)
LYMPHOCYTES # BLD AUTO: 2.46 10*3/MM3 (ref 0.6–4.8)
LYMPHOCYTES NFR BLD AUTO: 29.5 % (ref 24–44)
MCH RBC QN AUTO: 31.2 PG (ref 27–31)
MCHC RBC AUTO-ENTMCNC: 33.7 G/DL (ref 32–36)
MCV RBC AUTO: 92.5 FL (ref 80–99)
MONOCYTES # BLD AUTO: 0.7 10*3/MM3 (ref 0–1)
MONOCYTES NFR BLD AUTO: 8.4 % (ref 0–12)
NEUTROPHILS # BLD AUTO: 5.1 10*3/MM3 (ref 1.5–8.3)
NEUTROPHILS NFR BLD AUTO: 61.1 % (ref 41–71)
PLATELET # BLD AUTO: 112 10*3/MM3 (ref 150–450)
PMV BLD AUTO: 11.1 FL (ref 6–12)
RBC # BLD AUTO: 3.72 10*6/MM3 (ref 3.89–5.14)
WBC NRBC COR # BLD: 8.35 10*3/MM3 (ref 3.5–10.8)

## 2018-03-23 PROCEDURE — 85025 COMPLETE CBC W/AUTO DIFF WBC: CPT | Performed by: OBSTETRICS & GYNECOLOGY

## 2018-03-23 PROCEDURE — 63710000001 DIPHENHYDRAMINE PER 50 MG: Performed by: OBSTETRICS & GYNECOLOGY

## 2018-03-23 RX ORDER — PRENATAL VIT/IRON FUM/FOLIC AC 27MG-0.8MG
1 TABLET ORAL DAILY
Status: DISCONTINUED | OUTPATIENT
Start: 2018-03-23 | End: 2018-03-25 | Stop reason: HOSPADM

## 2018-03-23 RX ORDER — DIPHENHYDRAMINE HCL 25 MG
25 CAPSULE ORAL EVERY 6 HOURS PRN
Status: DISCONTINUED | OUTPATIENT
Start: 2018-03-23 | End: 2018-03-25 | Stop reason: HOSPADM

## 2018-03-23 RX ADMIN — IBUPROFEN 600 MG: 600 TABLET ORAL at 20:35

## 2018-03-23 RX ADMIN — DOCUSATE SODIUM 100 MG: 100 CAPSULE, LIQUID FILLED ORAL at 17:57

## 2018-03-23 RX ADMIN — SIMETHICONE CHEW TAB 80 MG 80 MG: 80 TABLET ORAL at 17:57

## 2018-03-23 RX ADMIN — SIMETHICONE CHEW TAB 80 MG 80 MG: 80 TABLET ORAL at 08:17

## 2018-03-23 RX ADMIN — SIMETHICONE CHEW TAB 80 MG 80 MG: 80 TABLET ORAL at 15:31

## 2018-03-23 RX ADMIN — HYDROCODONE BITARTRATE AND ACETAMINOPHEN 2 TABLET: 5; 325 TABLET ORAL at 20:35

## 2018-03-23 RX ADMIN — IBUPROFEN 600 MG: 600 TABLET ORAL at 04:30

## 2018-03-23 RX ADMIN — HYDROCODONE BITARTRATE AND ACETAMINOPHEN 2 TABLET: 5; 325 TABLET ORAL at 15:50

## 2018-03-23 RX ADMIN — HYDROCODONE BITARTRATE AND ACETAMINOPHEN 2 TABLET: 5; 325 TABLET ORAL at 04:29

## 2018-03-23 RX ADMIN — HYDROCODONE BITARTRATE AND ACETAMINOPHEN 2 TABLET: 5; 325 TABLET ORAL at 11:28

## 2018-03-23 RX ADMIN — SIMETHICONE CHEW TAB 80 MG 80 MG: 80 TABLET ORAL at 20:35

## 2018-03-23 RX ADMIN — IBUPROFEN 600 MG: 600 TABLET ORAL at 11:28

## 2018-03-23 RX ADMIN — DIPHENHYDRAMINE HYDROCHLORIDE 25 MG: 25 CAPSULE ORAL at 08:17

## 2018-03-23 RX ADMIN — POLYETHYLENE GLYCOL (3350) 17 G: 17 POWDER, FOR SOLUTION ORAL at 08:17

## 2018-03-23 NOTE — LACTATION NOTE
03/23/18 0930   Maternal Assessment   Breast Size Issue none   Breast Shape pendulous   Breast Density Bilateral:;soft   Nipples Bilateral:;everted   Maternal Infant Feeding   Infant Positioning cradle;cross-cradle   Signs of Milk Transfer audible swallow   Latch Assistance no   Equipment Type   Breast Pump Type double electric, personal  (Has pump for home use.)

## 2018-03-23 NOTE — PLAN OF CARE
Problem: Patient Care Overview  Goal: Plan of Care Review  Outcome: Ongoing (interventions implemented as appropriate)    Goal: Individualization and Mutuality  Outcome: Ongoing (interventions implemented as appropriate)    Goal: Discharge Needs Assessment  Outcome: Ongoing (interventions implemented as appropriate)      Problem:  Delivery (Adult,Obstetrics,Pediatric)  Goal: Signs and Symptoms of Listed Potential Problems Will be Absent, Minimized or Managed ( Delivery)  Outcome: Ongoing (interventions implemented as appropriate)

## 2018-03-23 NOTE — PLAN OF CARE
Problem: Patient Care Overview  Goal: Plan of Care Review  Outcome: Ongoing (interventions implemented as appropriate)   18 1513   Coping/Psychosocial   Plan of Care Reviewed With patient   Plan of Care Review   Progress no change   OTHER   Outcome Summary incsion well approximated     Goal: Individualization and Mutuality  Outcome: Ongoing (interventions implemented as appropriate)    Goal: Discharge Needs Assessment  Outcome: Ongoing (interventions implemented as appropriate)    Goal: Interprofessional Rounds/Family Conf  Outcome: Ongoing (interventions implemented as appropriate)      Problem: Skin Injury Risk (Adult)  Goal: Identify Related Risk Factors and Signs and Symptoms  Outcome: Ongoing (interventions implemented as appropriate)    Goal: Skin Health and Integrity  Outcome: Ongoing (interventions implemented as appropriate)      Problem:  Delivery (Adult,Obstetrics,Pediatric)  Goal: Signs and Symptoms of Listed Potential Problems Will be Absent, Minimized or Managed ( Delivery)  Outcome: Ongoing (interventions implemented as appropriate)

## 2018-03-23 NOTE — ANESTHESIA POSTPROCEDURE EVALUATION
Patient: Becky Naqvi    Procedure Summary     Date:  18 Room / Location:  [facility] LABOR DELIVERY   [facility]    Anesthesia Start:   Anesthesia Stop:      Procedure:   SECTION REPEAT * 40 WKS * (N/A Abdomen) Diagnosis:      Surgeon:  Becky Lobo MD Provider:  Ryann Mcpherson DO    Anesthesia Type:  spinal, ITN ASA Status:  2          Anesthesia Type: spinal, ITN  Last vitals  BP   105/57 (18 0700)   Temp   98.3 °F (36.8 °C) (18 0700)   Pulse   64 (18 07)   Resp   16 (18 07)     SpO2   99 % (18)     Post Anesthesia Care and Evaluation    Patient location during evaluation: bedside  Patient participation: complete - patient participated  Level of consciousness: awake and alert  Pain management: adequate  Airway patency: patent  Anesthetic complications: No anesthetic complications    Cardiovascular status: acceptable  Respiratory status: acceptable  Hydration status: acceptable  Post Neuraxial Block status: Motor and sensory function returned to baseline and No signs or symptoms of PDPH

## 2018-03-23 NOTE — ANESTHESIA PROCEDURE NOTES
Spinal Block    Patient location during procedure: OB  Indication:procedure for pain  Performed By  Anesthesiologist: LUNA CAIN  Preanesthetic Checklist  Completed: patient identified, surgical consent, pre-op evaluation, timeout performed, IV checked, risks and benefits discussed and monitors and equipment checked  Spinal Block Prep:  Patient Position:sitting  Sterile Tech:cap, gloves, mask and sterile barriers  Prep:DuraPrep  Patient Monitoring:blood pressure monitoring and EKG  Spinal Block Procedure  Approach:midline  Guidance:palpation technique  Location:L3-L4  Needle Type:Neftali  Needle Gauge:25 G  Placement of Spinal needle event:cerebrospinal fluid aspirated  Paresthesia: no  Fluid Appearance:clear  Post Assessment  Patient Tolerance:patient tolerated the procedure well with no apparent complications  Complications no

## 2018-03-23 NOTE — ANESTHESIA POSTPROCEDURE EVALUATION
Patient: Becky Naqvi    Procedure Summary     Date:  18 Room / Location:  [facility] LABOR DELIVERY   [facility]    Anesthesia Start:   Anesthesia Stop:      Procedure:   SECTION REPEAT * 40 WKS * (N/A Abdomen) Diagnosis:      Surgeon:  Becky Lobo MD Provider:      Anesthesia Type:  spinal, ITN ASA Status:  Not recorded          Anesthesia Type: spinal, ITN  Last vitals  /64     Temp   98   Pulse   92   Resp   18   SpO2    95     Post Anesthesia Care and Evaluation    Patient location during evaluation: bedside  Patient participation: complete - patient participated  Level of consciousness: awake and awake and alert  Pain score: 0  Pain management: satisfactory to patient  Airway patency: patent  Anesthetic complications: No anesthetic complications  PONV Status: none  Cardiovascular status: acceptable  Respiratory status: acceptable  Hydration status: acceptable  Post Neuraxial Block status: No signs or symptoms of PDPH

## 2018-03-23 NOTE — PROGRESS NOTES
3/23/2018    Name:Becky Naqvi    MR#:[id number]     PROGRESS NOTE:  Post-Op 1 S/P    HD:1    Subjective   28 y.o. yo Female  s/p CS at 39w3d doing well. Pain well controlled. Tolerating regular diet and not having flatus. Lochia normal.     Patient Active Problem List   Diagnosis   • Anemia   • Ganglion of hand   • Gastroesophageal reflux disease   • Basedow's disease   • Hyperthyroidism   • Pain in joint   • Leukopenia   • Infectious diarrhea   • Back pain   • Seasonal allergic rhinitis   • Pregnancy        Objective    Vitals  Temp:  Temp:  [96.9 °F (36.1 °C)-98.6 °F (37 °C)] 98.3 °F (36.8 °C)  Temp src: Oral  BP:  BP: ()/(57-79) 105/57  Pulse:  Heart Rate:  [] 64  RR:   Resp:  [16-18] 16    General Awake, alert, no distress  Abdomen Soft, non-distended, fundus firm, below umbilicus, appropriately tender  Incision  drsg Intact, no erythema or exudate  Extremities Calves NT bilaterally     I/O last 3 completed shifts:  In: 2000 [I.V.:2000]  Out: 2100 [Urine:1100; Blood:1000]    LABS:   Lab Results   Component Value Date    WBC 8.35 2018    HGB 11.6 2018    HCT 34.4 (L) 2018    MCV 92.5 2018     (L) 2018       Infant: female , doing well.      Assessment   1.  POD 1    Plan: Doing well.  Routine postoperative care. advance      Active Problems:   None      Mila Mustafa, JOAQUIN  3/23/2018 9:20 AM

## 2018-03-23 NOTE — PLAN OF CARE
Problem: Patient Care Overview  Goal: Plan of Care Review  Outcome: Ongoing (interventions implemented as appropriate)   03/23/18 1921   Coping/Psychosocial   Plan of Care Reviewed With patient;significant other   Plan of Care Review   Progress improving   OTHER   Outcome Summary Continue to breastfeed on demand and no longer than 3 hours and report any nipple damage

## 2018-03-23 NOTE — LACTATION NOTE
Gave BF info. Patient reports baby has nursed well and has no tenderness or damage of nipples. Nursed first child for 3 weeks.  Encouraged to call out for any questions/concerns regarding breastfeeding.

## 2018-03-23 NOTE — OP NOTE
Operative Note    Patient name: Becky Naqvi  YOB: 1990   MRN: [id number]  Admission Date: 3/22/2018  Referring Provider: Emma Carney MD    ID: 28 y.o.  at 39w3d    Preoperative Diagnosis:   Patient Active Problem List   Diagnosis   • Anemia   • Ganglion of hand   • Gastroesophageal reflux disease   • Basedow's disease   • Hyperthyroidism   • Pain in joint   • Leukopenia   • Infectious diarrhea   • Back pain   • Seasonal allergic rhinitis   • Pregnancy   previous c/s, desires repeat  SROM    Postoperative Diagnosis: Same as above.    Procedure(s): repeatlow transverse  delivery     Surgeons: Surgeon(s) and Role:     * Charles Gudino MD - Assisting     * Becky Lobo MD - Primary    Anesthesia: Spinal    Estimated Blood Loss: 1000 mL mL    IV Fluids: [unfilled]    Preoperative antibiotic: Ancef (cefazolin) 2 grams    Blood products:   Blood Administration Record     None          Pathology:   Order Name Source Comment Collection Info Order Time   BLOOD GAS, ARTERIAL, CORD Umbilical Cord  Collected By: Mari Hennessy RN 3/22/2018  8:21 PM   BLOOD GAS, VENOUS, CORD Umbilical Cord  Collected By: Mari Hennessy RN 3/22/2018  8:21 PM       Drains: De Souza catheter to gravity    Complications: None    Condition: Stable to recovery room                                          Infant:                 Gender: female  infant    Weight: 3405 g (7 lb 8.1 oz)     Apgars: 8   @ 1 minute /     9   @ 5 minutes    Cord gases: Venous:  @BABYNOHDR(BRIEFLAB, PHCVEN, BECVEN)@     Arterial:  @BABYNOHDR(BRIEFLAB, PHCART, BECART)@         Operative Summary:   After obtaining informed consent the patient was taken to the operating room where adequate anesthesia was obtained.  De Souza catheter was placed in the bladder preoperatively.  IV antibiotics were given preoperatively.       The abdomen was prepped and draped in the usual sterile fashion for  delivery.  After confirming adequate anesthesia  a Pfannenstiel skin incision was made with the scalpel and carried through to the underlying layer of fascia.  The fascia was incised in the midline and the incision extended laterally with the Boyd scissors and with blunt dissection.       The upper aspect of the fascia was grasped with 2 Kocher clamps, elevated, and dissected off the underlying rectus muscles bluntly and with the Boyd scissors.  The Kocher clamps were removed and applied to the inferior aspect of the fascia.  The fascia was dissected off of the rectus muscles in the same fashion.  The peritoneum was entered bluntly.  The incision was stretched and the bladder blade and Nassar retractor inserted for visualization of the uterus. A bladder flap was made and bladder blade replaced.        The uterus was incised with the scalpel in a low transverse fashion.  The uterine incision was entered digitally and the incision extended bluntly in a cranial-caudal fashion.  Retractors were removed and membranes were ruptured.  The infant was delivered atraumatically from vtx presentation.  The umbilical cord was milked 4 times, clamped and cut and the nose and mouth bulb suctioned.  The infant was handed off to waiting pediatric staff.       Cord blood gases were collected from a clamped segment of umbilical cord.  Cord blood was collected.  The placenta was removed using cord traction and uterine massage.  The uterus was exteriorized and cleared of all clots and debris.  The uterine incision was repaired with #1 chromic in a running locked fashion. A double layer technique was used.  Additional hemostatic measures required: none and figure-of-eight sutures.    The incision was inspected and excellent hemostasis was noted.  The tubes and ovaries were noted to be normal.   The uterus was returned to the abdomen.  The gutters were cleared of all clots and debris.  Irrigation was used.  The uterine incision was again inspected and found to be hemostatic.        The peritoneum was reapproximated with 2-0 vicryl in a running fashion.  The fascia was closed with 0 vicryl in a running fashion.  The subcutaneous space was reapproximated using 3-0 plain gut in interrupted stiches.      The skin was closed using staples, and dressing placed. All sharp, instrument, and sponge counts were correct. The patient was transferred to the recovery room in stable condition.    Becky Lobo MD  3/22/2018

## 2018-03-24 RX ADMIN — SIMETHICONE CHEW TAB 80 MG 80 MG: 80 TABLET ORAL at 16:27

## 2018-03-24 RX ADMIN — SIMETHICONE CHEW TAB 80 MG 80 MG: 80 TABLET ORAL at 12:28

## 2018-03-24 RX ADMIN — HYDROCODONE BITARTRATE AND ACETAMINOPHEN 2 TABLET: 5; 325 TABLET ORAL at 16:27

## 2018-03-24 RX ADMIN — HYDROCODONE BITARTRATE AND ACETAMINOPHEN 2 TABLET: 5; 325 TABLET ORAL at 10:31

## 2018-03-24 RX ADMIN — HYDROCODONE BITARTRATE AND ACETAMINOPHEN 2 TABLET: 5; 325 TABLET ORAL at 22:26

## 2018-03-24 RX ADMIN — SIMETHICONE CHEW TAB 80 MG 80 MG: 80 TABLET ORAL at 08:16

## 2018-03-24 RX ADMIN — IBUPROFEN 600 MG: 600 TABLET ORAL at 10:32

## 2018-03-24 RX ADMIN — POLYETHYLENE GLYCOL (3350) 17 G: 17 POWDER, FOR SOLUTION ORAL at 12:28

## 2018-03-24 RX ADMIN — SIMETHICONE CHEW TAB 80 MG 80 MG: 80 TABLET ORAL at 22:26

## 2018-03-24 RX ADMIN — IBUPROFEN 600 MG: 600 TABLET ORAL at 16:27

## 2018-03-24 RX ADMIN — DOCUSATE SODIUM 100 MG: 100 CAPSULE, LIQUID FILLED ORAL at 16:28

## 2018-03-24 RX ADMIN — IBUPROFEN 600 MG: 600 TABLET ORAL at 22:26

## 2018-03-24 RX ADMIN — DOCUSATE SODIUM 100 MG: 100 CAPSULE, LIQUID FILLED ORAL at 08:16

## 2018-03-24 RX ADMIN — PRENATAL VIT W/ FE FUMARATE-FA TAB 27-0.8 MG 1 TABLET: 27-0.8 TAB at 08:16

## 2018-03-24 NOTE — PLAN OF CARE
Problem: Patient Care Overview  Goal: Plan of Care Review   03/24/18 1641   OTHER   Outcome Summary Patient rates pain less than 3 one hour after pain meds

## 2018-03-24 NOTE — PLAN OF CARE
Problem: Skin Injury Risk (Adult)  Goal: Identify Related Risk Factors and Signs and Symptoms  Outcome: Outcome(s) achieved Date Met: 03/24/18    Goal: Skin Health and Integrity  Outcome: Ongoing (interventions implemented as appropriate)      Problem: Breastfeeding (Adult,Obstetrics,Pediatric)  Goal: Signs and Symptoms of Listed Potential Problems Will be Absent, Minimized or Managed (Breastfeeding)  Outcome: Ongoing (interventions implemented as appropriate)

## 2018-03-24 NOTE — PLAN OF CARE
Problem: Patient Care Overview  Goal: Plan of Care Review  Outcome: Ongoing (interventions implemented as appropriate)   03/24/18 0625   Coping/Psychosocial   Plan of Care Reviewed With patient   Plan of Care Review   Progress improving

## 2018-03-24 NOTE — PLAN OF CARE
Problem: Patient Care Overview  Goal: Individualization and Mutuality  Outcome: Ongoing (interventions implemented as appropriate)    Goal: Discharge Needs Assessment  Outcome: Ongoing (interventions implemented as appropriate)    Goal: Interprofessional Rounds/Family Conf  Outcome: Ongoing (interventions implemented as appropriate)

## 2018-03-24 NOTE — PROGRESS NOTES
3/24/2018    Name:Becky Naqvi    MR#:[id number]     PROGRESS NOTE:  Post-Op 2 S/P    HD:2    Subjective   28 y.o. yo Female  s/p CS at 39w3d doing well. Pain well controlled. Tolerating regular diet and having flatus. Lochia normal.     Patient Active Problem List   Diagnosis   • Anemia   • Ganglion of hand   • Gastroesophageal reflux disease   • Basedow's disease   • Hyperthyroidism   • Pain in joint   • Leukopenia   • Infectious diarrhea   • Back pain   • Seasonal allergic rhinitis   • Pregnancy        Objective    Vitals  Temp:  Temp:  [97.8 °F (36.6 °C)-98.5 °F (36.9 °C)] 98.2 °F (36.8 °C)  Temp src: Oral  BP:  BP: ()/(53-81) 112/58  Pulse:  Heart Rate:  [59-82] 69  RR:   Resp:  [16] 16    General Awake, alert, no distress  Abdomen Soft, non-distended, fundus firm, below umbilicus, appropriately tender  Incision  Intact, no erythema or exudate  Extremities Calves NT bilaterally     I/O last 3 completed shifts:  In: 2000 [I.V.:2000]  Out: 4800 [Urine:3800; Blood:1000]    LABS:   Lab Results   Component Value Date    WBC 8.35 2018    HGB 11.6 2018    HCT 34.4 (L) 2018    MCV 92.5 2018     (L) 2018       Infant: female       Assessment   1.  POD 2; Repeat c section.    2.  Low platelets; repeat in am.     Plan: Doing well.  Routine postoperative care      Active Problems:   None      Vanessa Herbert, APRN  3/24/2018 9:34 AM

## 2018-03-25 ENCOUNTER — APPOINTMENT (OUTPATIENT)
Dept: PREADMISSION TESTING | Facility: HOSPITAL | Age: 28
End: 2018-03-25

## 2018-03-25 VITALS
RESPIRATION RATE: 16 BRPM | OXYGEN SATURATION: 99 % | DIASTOLIC BLOOD PRESSURE: 70 MMHG | BODY MASS INDEX: 30.82 KG/M2 | HEIGHT: 65 IN | SYSTOLIC BLOOD PRESSURE: 108 MMHG | TEMPERATURE: 97.8 F | HEART RATE: 62 BPM | WEIGHT: 185 LBS

## 2018-03-25 LAB
DEPRECATED RDW RBC AUTO: 43.8 FL (ref 37–54)
ERYTHROCYTE [DISTWIDTH] IN BLOOD BY AUTOMATED COUNT: 13 % (ref 11.3–14.5)
HCT VFR BLD AUTO: 37 % (ref 34.5–44)
HGB BLD-MCNC: 12.3 G/DL (ref 11.5–15.5)
MCH RBC QN AUTO: 30.9 PG (ref 27–31)
MCHC RBC AUTO-ENTMCNC: 33.2 G/DL (ref 32–36)
MCV RBC AUTO: 93 FL (ref 80–99)
PLATELET # BLD AUTO: 150 10*3/MM3 (ref 150–450)
PMV BLD AUTO: 11.3 FL (ref 6–12)
RBC # BLD AUTO: 3.98 10*6/MM3 (ref 3.89–5.14)
WBC NRBC COR # BLD: 9.85 10*3/MM3 (ref 3.5–10.8)

## 2018-03-25 PROCEDURE — 85027 COMPLETE CBC AUTOMATED: CPT | Performed by: OBSTETRICS & GYNECOLOGY

## 2018-03-25 RX ORDER — HYDROCODONE BITARTRATE AND ACETAMINOPHEN 5; 325 MG/1; MG/1
2 TABLET ORAL EVERY 4 HOURS PRN
Qty: 24 TABLET | Refills: 0 | Status: SHIPPED | OUTPATIENT
Start: 2018-03-25 | End: 2018-04-01

## 2018-03-25 RX ORDER — IBUPROFEN 600 MG/1
600 TABLET ORAL EVERY 6 HOURS PRN
Qty: 30 TABLET | Refills: 1 | Status: SHIPPED | OUTPATIENT
Start: 2018-03-25 | End: 2018-05-15

## 2018-03-25 RX ADMIN — SIMETHICONE CHEW TAB 80 MG 80 MG: 80 TABLET ORAL at 11:30

## 2018-03-25 RX ADMIN — HYDROCODONE BITARTRATE AND ACETAMINOPHEN 2 TABLET: 5; 325 TABLET ORAL at 05:03

## 2018-03-25 RX ADMIN — IBUPROFEN 600 MG: 600 TABLET ORAL at 05:03

## 2018-03-25 RX ADMIN — IBUPROFEN 600 MG: 600 TABLET ORAL at 11:29

## 2018-03-25 RX ADMIN — HYDROCODONE BITARTRATE AND ACETAMINOPHEN 2 TABLET: 5; 325 TABLET ORAL at 11:30

## 2018-03-25 NOTE — PLAN OF CARE
Problem: Patient Care Overview  Goal: Plan of Care Review  Outcome: Ongoing (interventions implemented as appropriate)   03/24/18 2114   Coping/Psychosocial   Plan of Care Reviewed With patient   OTHER   Outcome Summary VSS. PP exam wnl. Tolerating pain with po pain meds. Bonding well with infant. Breastfeeding well. Plan for dc home in am.     Goal: Individualization and Mutuality  Outcome: Ongoing (interventions implemented as appropriate)    Goal: Discharge Needs Assessment  Outcome: Ongoing (interventions implemented as appropriate)      Problem: Breastfeeding (Adult,Obstetrics,Pediatric)  Goal: Signs and Symptoms of Listed Potential Problems Will be Absent, Minimized or Managed (Breastfeeding)  Outcome: Ongoing (interventions implemented as appropriate)

## 2018-03-25 NOTE — DISCHARGE SUMMARY
Discharge Summary    Date of Admission: 3/22/2018  Date of Discharge:  3/25/2018      Patient: Becky Naqvi      MR#:[id number]    Delivering Surgeon/OB: Becky Lobo MD    Attending Surgeon/OB:  Emma Carney MD    Presenting Problem/History of Present Illness  Pregnancy [Z34.90]     Patient Active Problem List   Diagnosis   • Anemia   • Ganglion of hand   • Gastroesophageal reflux disease   • Basedow's disease   • Hyperthyroidism   • Pain in joint   • Leukopenia   • Infectious diarrhea   • Back pain   • Seasonal allergic rhinitis   • Delivered by  section         Discharge Diagnosis:  section at 39w3d    Procedures:  , Low Transverse     3/22/2018    8:13 PM      Feeding method: Breastfeeding Status: Yes    Rh Immune globulin given: not applicable    Rubella vaccine given: not applicable    Discharge Date:  3/25/2018    Early Discharge:  NO    Hospital Course  Patient is a 28 y.o. female  at 39w3d status post  section with uneventful postoperative recovery.  Patient was advanced to regular diet on postoperative day#1.  On discharge, ambulating, tolerating a regular diet without any difficulties and her incision is dry, clean and intact.     Infant:   female  fetus 3405 g (7 lb 8.1 oz)  with Apgar scores of 8  , 9   at five minutes.    Circumcision: no    Condition on Discharge:  Stable    Vital Signs  Temp:  [97.8 °F (36.6 °C)-98.4 °F (36.9 °C)] 97.8 °F (36.6 °C)  Heart Rate:  [62-65] 62  Resp:  [16] 16  BP: (101-125)/(55-71) 108/70    Lab Results   Component Value Date    WBC 9.85 2018    HGB 12.3 2018    HCT 37.0 2018    MCV 93.0 2018     2018       Discharge Disposition  Home or Self Care    Discharge Medications   Becky Naqvi   Home Medication Instructions WENDY:[id number]    Printed on:18 1339   Medication Information                      HYDROcodone-acetaminophen (NORCO) 5-325 MG per tablet  Take 2 tablets by mouth  Every 4 (Four) Hours As Needed for Severe Pain  for up to 7 days.             ibuprofen (ADVIL,MOTRIN) 600 MG tablet  Take 1 tablet by mouth Every 6 (Six) Hours As Needed for Mild Pain .             Prenatal Vit-Fe Fumarate-FA (PRENATAL 27-1) 27-1 MG tablet tablet  Take 1 tablet by mouth Daily.                 Discharge Diet: Regular    Activity at Discharge: Limit with no lifting or driving for 2 wk.     Follow-up Appointments  Future Appointments  Date Time Provider Department Center   5/15/2018 1:00 PM Marni Pan MD [facility] None     Additional Instructions for the Follow-ups that You Need to Schedule     Discharge Follow-up with Specified Provider: Dr Carney; 2 Weeks    As directed      To:  Dr Carney    Follow Up:  2 Weeks               Kadeem Heath MD  03/25/18  1:31 PM

## 2018-05-15 ENCOUNTER — OFFICE VISIT (OUTPATIENT)
Dept: ENDOCRINOLOGY | Facility: CLINIC | Age: 28
End: 2018-05-15

## 2018-05-15 VITALS
HEIGHT: 66 IN | HEART RATE: 69 BPM | BODY MASS INDEX: 26.52 KG/M2 | SYSTOLIC BLOOD PRESSURE: 104 MMHG | OXYGEN SATURATION: 99 % | DIASTOLIC BLOOD PRESSURE: 68 MMHG | WEIGHT: 165 LBS

## 2018-05-15 DIAGNOSIS — E05.90 HYPERTHYROIDISM: Primary | ICD-10-CM

## 2018-05-15 LAB
T4 FREE SERPL-MCNC: 1.12 NG/DL (ref 0.89–1.76)
TSH SERPL DL<=0.05 MIU/L-ACNC: 0.13 MIU/ML (ref 0.35–5.35)

## 2018-05-15 PROCEDURE — 84481 FREE ASSAY (FT-3): CPT | Performed by: INTERNAL MEDICINE

## 2018-05-15 PROCEDURE — 99213 OFFICE O/P EST LOW 20 MIN: CPT | Performed by: INTERNAL MEDICINE

## 2018-05-15 PROCEDURE — 84445 ASSAY OF TSI GLOBULIN: CPT | Performed by: INTERNAL MEDICINE

## 2018-05-15 PROCEDURE — 86376 MICROSOMAL ANTIBODY EACH: CPT | Performed by: INTERNAL MEDICINE

## 2018-05-15 PROCEDURE — 86800 THYROGLOBULIN ANTIBODY: CPT | Performed by: INTERNAL MEDICINE

## 2018-05-15 PROCEDURE — 84439 ASSAY OF FREE THYROXINE: CPT | Performed by: INTERNAL MEDICINE

## 2018-05-15 PROCEDURE — 84443 ASSAY THYROID STIM HORMONE: CPT | Performed by: INTERNAL MEDICINE

## 2018-05-15 NOTE — PROGRESS NOTES
Becky Naqvi 28 y.o.  [facility]:Follow-up and Hyperthyroidism (after delivery on 3-, birth weight was 7 lbs, 8 oz)      [place]: Follow-up and Hyperthyroidism (after delivery on 3-, birth weight was 7 lbs, 8 oz)    Doing well overall   Some palpitations   Intermittent nausea - had gb u/s yesterday   Sees GI next week   Is postpartum  No problems with baby- doing well and normal weight gain     Allergies   Allergen Reactions   • Oxycodone-Acetaminophen    • Propylthiouracil      Neutropenia       Current Outpatient Prescriptions:   •  Prenatal Vit-Fe Fumarate-FA (PRENATAL ) 27-1 MG tablet tablet, Take 1 tablet by mouth Daily., Disp: , Rfl:   Patient Active Problem List    Diagnosis   • Delivered by  section [O82]   • Anemia [D64.9]     Overview Note:     Impression: 2015 - update cbc  Impression: 2015 - update cbc  Impression: 2014 - check cbc;      • Ganglion of hand [M67.449]   • Gastroesophageal reflux disease [K21.9]   • 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]     Overview Note:     Impression: 2015 - check tfts  Impression: 09/15/2015 - stable exam  Impression: 2015 - check tsi  Impression: 2015 - check TSI  Impression: 2015 - check tsi   discussed options for treatment   will continue with methimazole dailly;      • Hyperthyroidism [E05.90]     Overview Note:     Impression: 2015 - get tfts  Impression: 09/15/2015 - improving tsh, low t3 and t4  cbc, and cmp are normal  continue 2.5 mg of methimazole daily   repeat tsh in 4-6 weeks - gave order  reviewed lab work from Dr Doss  Impression: 2015 - check all levels   discussed therapy at 14 weeks  Impression: 2015 - check tfts, ab and hcg  Impression: 2015 - update cbc, cmp, tfts, tsi (baseline)  Impression: 2015 - check free t4, free t3 and tsh, tsi  Impression: 2014 - check thyroid hormone levels, repeat tsh and check thyroid antibodies  discussed ddx   discussed treatment  options for graves dz  await blood work - use ptu in light of poss fertility;      • Pain in joint [M25.50]     Overview Note:     Impression: 12/30/2014 - diffuse, intermittent, with am stiffness  check darcy, rf and esr;      • Leukopenia [D72.819]     Overview Note:     Impression: 12/08/2015 - check cbc  Impression: 05/29/2015 - assoc with anti thyroid medications  Impression: 04/16/2015 - check cbc- so far tolerating well;      • Infectious diarrhea [A09]     Overview Note:     Impression: 03/03/2016 - no s/p antbiotics, has colonoscopy set up    no further diarrhea;      • Back pain [M54.9]   • Seasonal allergic rhinitis [J30.2]     Review of Systems   Constitutional: Negative for activity change, appetite change, chills, diaphoresis, fatigue, fever and unexpected weight change.   HENT: Negative for congestion, dental problem, drooling, ear discharge, ear pain, facial swelling, hearing loss, mouth sores, nosebleeds, postnasal drip, rhinorrhea, sinus pressure, sneezing, sore throat, tinnitus, trouble swallowing and voice change.    Eyes: Negative for photophobia, pain, discharge, redness, itching and visual disturbance.   Respiratory: Negative for apnea, cough, choking, chest tightness, shortness of breath, wheezing and stridor.    Cardiovascular: Positive for palpitations. Negative for chest pain and leg swelling.   Gastrointestinal: Negative for abdominal distention, abdominal pain, anal bleeding, blood in stool, constipation, diarrhea, nausea, rectal pain and vomiting.   Endocrine: Negative for cold intolerance, heat intolerance, polydipsia, polyphagia and polyuria.   Genitourinary: Negative for decreased urine volume, difficulty urinating, dysuria, enuresis, flank pain, frequency, genital sores, hematuria and urgency.   Musculoskeletal: Negative for arthralgias, back pain, gait problem, joint swelling, myalgias, neck pain and neck stiffness.   Skin: Negative for color change, pallor, rash and wound.  "  Allergic/Immunologic: Negative for environmental allergies, food allergies and immunocompromised state.   Neurological: Negative for dizziness, tremors, seizures, syncope, facial asymmetry, speech difficulty, weakness, light-headedness, numbness and headaches.   Hematological: Negative for adenopathy. Does not bruise/bleed easily.   Psychiatric/Behavioral: Negative for agitation, behavioral problems, confusion, decreased concentration, dysphoric mood, hallucinations, self-injury, sleep disturbance and suicidal ideas. The patient is not nervous/anxious and is not hyperactive.      Social History     Social History   • Marital status:      Spouse name: N/A   • Number of children: N/A   • Years of education: N/A     Occupational History   • Not on file.     Social History Main Topics   • Smoking status: Never Smoker   • Smokeless tobacco: Never Used   • Alcohol use No   • Drug use: No   • Sexual activity: Defer     Other Topics Concern   • Not on file     Social History Narrative   • No narrative on file     Family History   Problem Relation Age of Onset   • Arthritis Other    • Hyperlipidemia Other    • Thyroid disease Other    • Heart attack Other    • Hypertension Other    • Lung cancer Other    • Lung cancer Other    • Hypertension Father    • Lung cancer Paternal Uncle    • Heart attack Maternal Grandmother    • Lung cancer Paternal Grandmother      /68   Pulse 69   Ht 167.6 cm (66\")   Wt 74.8 kg (165 lb)   SpO2 99%   Breastfeeding? Yes   BMI 26.63 kg/m²   Physical Exam   Constitutional: She is oriented to person, place, and time. She appears well-developed and well-nourished.   HENT:   Head: Normocephalic and atraumatic.   Nose: Nose normal.   Mouth/Throat: Oropharynx is clear and moist.   Eyes: Conjunctivae, EOM and lids are normal. Pupils are equal, round, and reactive to light.   Neck: Trachea normal and normal range of motion. Neck supple. Carotid bruit is not present. No tracheal " deviation present. No thyroid mass and no thyromegaly (mild right sided goiter ) present.   Cardiovascular: Normal rate, regular rhythm, normal heart sounds and intact distal pulses.  Exam reveals no gallop and no friction rub.    No murmur heard.  Pulmonary/Chest: Effort normal and breath sounds normal. No respiratory distress. She has no wheezes.   Musculoskeletal: Normal range of motion. She exhibits no edema or deformity.   Lymphadenopathy:     She has no cervical adenopathy.   Neurological: She is alert and oriented to person, place, and time. She has normal reflexes. She displays normal reflexes. No cranial nerve deficit.   Skin: Skin is warm and dry. No rash noted. No cyanosis or erythema. Nails show no clubbing.   Psychiatric: She has a normal mood and affect. Her speech is normal and behavior is normal. Judgment and thought content normal. Cognition and memory are normal.   Nursing note and vitals reviewed.    Results for orders placed or performed during the hospital encounter of 03/22/18   CBC (No Diff)   Result Value Ref Range    WBC 6.99 3.50 - 10.80 10*3/mm3    RBC 4.29 3.89 - 5.14 10*6/mm3    Hemoglobin 13.3 11.5 - 15.5 g/dL    Hematocrit 39.3 34.5 - 44.0 %    MCV 91.6 80.0 - 99.0 fL    MCH 31.0 27.0 - 31.0 pg    MCHC 33.8 32.0 - 36.0 g/dL    RDW 12.8 11.3 - 14.5 %    RDW-SD 42.3 37.0 - 54.0 fl    MPV 12.0 6.0 - 12.0 fL    Platelets 156 150 - 450 10*3/mm3   Blood Gas, Arterial, Cord   Result Value Ref Range    pH, Cord Arterial 7.37 (H) 7.20 - 7.30 pH Units    pCO2, Cord Arterial 36.6 (L) 43.3 - 54.9 mmHg    pO2, Cord Arterial 27.2 11.5 - 43.3 mmHg    HCO3, Cord Arterial 20.9 (H) 16.9 - 20.5 mmol/L    Base Exc, Cord Arterial -3.8 (L) 0.0 - 2.0 mmol/L    O2 Sat, Cord Arterial 64.5 %    Hemoglobin, Blood Gas 17.7 14 - 18 g/dL    CO2 Content 22.0 22 - 33    Barometric Pressure for Blood Gas  mmHg    Modality Room Air     FIO2 21 %   Blood Gas, Venous, Cord   Result Value Ref Range    Site Umbilical Vein      pH, Cord Venous 7.378 7.190 - 7.460 pH Units    pCO2, Cord Venous 36.7 30.0 - 60.0 mm Hg    pO2, Cord Venous 24.2 mm Hg    HCO3, Cord Venous 21.6 (H) 18.6 - 21.4 mmol/L    Base Excess, Cord Venous -3.0 (L) 0.0 - 2.0 mmol/L    O2 Sat, Cord Venous 58.7 %    Hemoglobin, Blood Gas 16.7 14 - 18 g/dL    CO2 Content 22.7 22 - 33    Barometric Pressure for Blood Gas  mmHg    Modality RA     FIO2 21 %    O2 Saturation Calculated  45.0 - 75.0 %   CBC Auto Differential   Result Value Ref Range    WBC 8.35 3.50 - 10.80 10*3/mm3    RBC 3.72 (L) 3.89 - 5.14 10*6/mm3    Hemoglobin 11.6 11.5 - 15.5 g/dL    Hematocrit 34.4 (L) 34.5 - 44.0 %    MCV 92.5 80.0 - 99.0 fL    MCH 31.2 (H) 27.0 - 31.0 pg    MCHC 33.7 32.0 - 36.0 g/dL    RDW 12.7 11.3 - 14.5 %    RDW-SD 42.6 37.0 - 54.0 fl    MPV 11.1 6.0 - 12.0 fL    Platelets 112 (L) 150 - 450 10*3/mm3    Neutrophil % 61.1 41.0 - 71.0 %    Lymphocyte % 29.5 24.0 - 44.0 %    Monocyte % 8.4 0.0 - 12.0 %    Eosinophil % 0.6 0.0 - 3.0 %    Basophil % 0.2 0.0 - 1.0 %    Immature Grans % 0.2 0.0 - 0.6 %    Neutrophils, Absolute 5.10 1.50 - 8.30 10*3/mm3    Lymphocytes, Absolute 2.46 0.60 - 4.80 10*3/mm3    Monocytes, Absolute 0.70 0.00 - 1.00 10*3/mm3    Eosinophils, Absolute 0.05 0.00 - 0.30 10*3/mm3    Basophils, Absolute 0.02 0.00 - 0.20 10*3/mm3    Immature Grans, Absolute 0.02 0.00 - 0.03 10*3/mm3   CBC (No Diff)   Result Value Ref Range    WBC 9.85 3.50 - 10.80 10*3/mm3    RBC 3.98 3.89 - 5.14 10*6/mm3    Hemoglobin 12.3 11.5 - 15.5 g/dL    Hematocrit 37.0 34.5 - 44.0 %    MCV 93.0 80.0 - 99.0 fL    MCH 30.9 27.0 - 31.0 pg    MCHC 33.2 32.0 - 36.0 g/dL    RDW 13.0 11.3 - 14.5 %    RDW-SD 43.8 37.0 - 54.0 fl    MPV 11.3 6.0 - 12.0 fL    Platelets 150 150 - 450 10*3/mm3   POC Amnisure   Result Value Ref Range    POC Amnisure Negative    Type & Screen   Result Value Ref Range    ABO Type O     RH type Positive     Antibody Screen Negative      Problem List Items Addressed This Visit         Endocrine    Hyperthyroidism - Primary     Off medication   Check tfts and antibodies         Relevant Orders    TSH    T4, Free    Thyroid Antibodies    Thyroid Stimulating Immunoglobulin      Other Visit Diagnoses    None.       Return in about 6 months (around 11/15/2018) for Recheck 30 min .    Jumana Noguera MA  Signed Marni Pan MD

## 2018-05-16 LAB
T3FREE SERPL-MCNC: 3.3 PG/ML (ref 2–4.4)
THYROGLOB AB SERPL-ACNC: 1.3 IU/ML (ref 0–0.9)
THYROPEROXIDASE AB SERPL-ACNC: 57 IU/ML (ref 0–34)

## 2018-05-17 LAB — TSI SER-MCNC: 0.71 IU/L (ref 0–0.55)

## 2018-06-19 ENCOUNTER — TELEPHONE (OUTPATIENT)
Dept: INTERNAL MEDICINE | Facility: CLINIC | Age: 28
End: 2018-06-19

## 2018-06-19 DIAGNOSIS — E05.90 HYPERTHYROIDISM: Primary | ICD-10-CM

## 2018-06-19 NOTE — TELEPHONE ENCOUNTER
"PATIENT HAS BEEN EXPERIENCING HEART PALPITATIONS AND A \"SHAKY\" FEELING. SHE WOULD LIKE TO COME IN FOR LAB WORK TO SEE IF SHE NEEDS A CHANGE TO HER MEDICATION. PLEASE PUT ORDERS IN PATIENTS CHART SO SHE CAN HAVE HER LABS DRAWN. THANKS!   "

## 2018-06-25 LAB
T4 FREE SERPL-MCNC: 1.76 NG/DL (ref 0.89–1.76)
TSH SERPL DL<=0.05 MIU/L-ACNC: <0.004 MIU/ML (ref 0.35–5.35)

## 2018-06-25 PROCEDURE — 84443 ASSAY THYROID STIM HORMONE: CPT | Performed by: INTERNAL MEDICINE

## 2018-06-25 PROCEDURE — 84439 ASSAY OF FREE THYROXINE: CPT | Performed by: INTERNAL MEDICINE

## 2018-06-27 ENCOUNTER — TELEPHONE (OUTPATIENT)
Dept: ENDOCRINOLOGY | Facility: CLINIC | Age: 28
End: 2018-06-27

## 2018-06-27 RX ORDER — METHIMAZOLE 5 MG/1
5 TABLET ORAL DAILY
Qty: 30 TABLET | Refills: 3 | Status: SHIPPED | OUTPATIENT
Start: 2018-06-27 | End: 2018-09-26 | Stop reason: SDUPTHER

## 2018-08-10 RX ORDER — ATENOLOL 50 MG/1
TABLET ORAL
Qty: 90 TABLET | Refills: 0 | OUTPATIENT
Start: 2018-08-10

## 2018-08-30 RX ORDER — PRENATAL WITH FERROUS FUM AND FOLIC ACID 3080; 920; 120; 400; 22; 1.84; 3; 20; 10; 1; 12; 200; 27; 25; 2 [IU]/1; [IU]/1; MG/1; [IU]/1; MG/1; MG/1; MG/1; MG/1; MG/1; MG/1; UG/1; MG/1; MG/1; MG/1; MG/1
1 TABLET ORAL DAILY
Qty: 90 TABLET | Refills: 1 | Status: SHIPPED | OUTPATIENT
Start: 2018-08-30 | End: 2018-11-19

## 2018-09-11 RX ORDER — ATENOLOL 50 MG/1
TABLET ORAL
Qty: 90 TABLET | Refills: 0 | OUTPATIENT
Start: 2018-09-11

## 2018-09-11 NOTE — TELEPHONE ENCOUNTER
PATIENT ONLY  QUIT TAKING ATENOLOL BECAUSE SHE WAS PREGNANT AND BREAST FEEDING. SHE WOULD LIKE TO GET STARTED ON THIS MEDICATION AGAIN DUE TO HER PALPITATIONS RESURFACING.

## 2018-09-13 RX ORDER — ATENOLOL 50 MG/1
50 TABLET ORAL DAILY
Qty: 90 TABLET | Refills: 1 | Status: SHIPPED | OUTPATIENT
Start: 2018-09-13 | End: 2018-11-19

## 2018-09-24 ENCOUNTER — TELEPHONE (OUTPATIENT)
Dept: INTERNAL MEDICINE | Facility: CLINIC | Age: 28
End: 2018-09-24

## 2018-09-24 DIAGNOSIS — E05.90 HYPERTHYROIDISM: Primary | ICD-10-CM

## 2018-09-24 LAB
ALBUMIN SERPL-MCNC: 4.43 G/DL (ref 3.2–4.8)
ALBUMIN/GLOB SERPL: 2 G/DL (ref 1.5–2.5)
ALP SERPL-CCNC: 69 U/L (ref 25–100)
ALT SERPL W P-5'-P-CCNC: 44 U/L (ref 7–40)
ANION GAP SERPL CALCULATED.3IONS-SCNC: 11 MMOL/L (ref 3–11)
AST SERPL-CCNC: 28 U/L (ref 0–33)
BASOPHILS # BLD AUTO: 0.01 10*3/MM3 (ref 0–0.2)
BASOPHILS NFR BLD AUTO: 0.3 % (ref 0–1)
BILIRUB SERPL-MCNC: 0.4 MG/DL (ref 0.3–1.2)
BUN BLD-MCNC: 12 MG/DL (ref 9–23)
BUN/CREAT SERPL: 23.5 (ref 7–25)
CALCIUM SPEC-SCNC: 9.7 MG/DL (ref 8.7–10.4)
CHLORIDE SERPL-SCNC: 107 MMOL/L (ref 99–109)
CO2 SERPL-SCNC: 26 MMOL/L (ref 20–31)
CREAT BLD-MCNC: 0.51 MG/DL (ref 0.6–1.3)
DEPRECATED RDW RBC AUTO: 36.3 FL (ref 37–54)
EOSINOPHIL # BLD AUTO: 0.04 10*3/MM3 (ref 0–0.3)
EOSINOPHIL NFR BLD AUTO: 1 % (ref 0–3)
ERYTHROCYTE [DISTWIDTH] IN BLOOD BY AUTOMATED COUNT: 12 % (ref 11.3–14.5)
GFR SERPL CREATININE-BSD FRML MDRD: 143 ML/MIN/1.73
GLOBULIN UR ELPH-MCNC: 2.2 GM/DL
GLUCOSE BLD-MCNC: 94 MG/DL (ref 70–100)
HCT VFR BLD AUTO: 37.6 % (ref 34.5–44)
HGB BLD-MCNC: 12.4 G/DL (ref 11.5–15.5)
IMM GRANULOCYTES # BLD: 0.01 10*3/MM3 (ref 0–0.03)
IMM GRANULOCYTES NFR BLD: 0.3 % (ref 0–0.6)
LYMPHOCYTES # BLD AUTO: 1.65 10*3/MM3 (ref 0.6–4.8)
LYMPHOCYTES NFR BLD AUTO: 42.2 % (ref 24–44)
MCH RBC QN AUTO: 27.3 PG (ref 27–31)
MCHC RBC AUTO-ENTMCNC: 33 G/DL (ref 32–36)
MCV RBC AUTO: 82.6 FL (ref 80–99)
MONOCYTES # BLD AUTO: 0.48 10*3/MM3 (ref 0–1)
MONOCYTES NFR BLD AUTO: 12.3 % (ref 0–12)
NEUTROPHILS # BLD AUTO: 1.72 10*3/MM3 (ref 1.5–8.3)
NEUTROPHILS NFR BLD AUTO: 43.9 % (ref 41–71)
PLATELET # BLD AUTO: 187 10*3/MM3 (ref 150–450)
PMV BLD AUTO: 12.2 FL (ref 6–12)
POTASSIUM BLD-SCNC: 4.7 MMOL/L (ref 3.5–5.5)
PROT SERPL-MCNC: 6.6 G/DL (ref 5.7–8.2)
RBC # BLD AUTO: 4.55 10*6/MM3 (ref 3.89–5.14)
SODIUM BLD-SCNC: 144 MMOL/L (ref 132–146)
T4 FREE SERPL-MCNC: 3.16 NG/DL (ref 0.89–1.76)
TSH SERPL DL<=0.05 MIU/L-ACNC: 0.01 MIU/ML (ref 0.35–5.35)
WBC NRBC COR # BLD: 3.91 10*3/MM3 (ref 3.5–10.8)

## 2018-09-24 PROCEDURE — 80053 COMPREHEN METABOLIC PANEL: CPT | Performed by: INTERNAL MEDICINE

## 2018-09-24 PROCEDURE — 84439 ASSAY OF FREE THYROXINE: CPT | Performed by: INTERNAL MEDICINE

## 2018-09-24 PROCEDURE — 84443 ASSAY THYROID STIM HORMONE: CPT | Performed by: INTERNAL MEDICINE

## 2018-09-24 PROCEDURE — 85025 COMPLETE CBC W/AUTO DIFF WBC: CPT | Performed by: INTERNAL MEDICINE

## 2018-09-26 ENCOUNTER — TELEPHONE (OUTPATIENT)
Dept: ENDOCRINOLOGY | Facility: CLINIC | Age: 28
End: 2018-09-26

## 2018-09-26 RX ORDER — METHIMAZOLE 5 MG/1
5 TABLET ORAL 2 TIMES DAILY
Qty: 60 TABLET | Refills: 5 | Status: SHIPPED | OUTPATIENT
Start: 2018-09-26 | End: 2018-11-19 | Stop reason: SDUPTHER

## 2018-11-19 ENCOUNTER — OFFICE VISIT (OUTPATIENT)
Dept: ENDOCRINOLOGY | Facility: CLINIC | Age: 28
End: 2018-11-19

## 2018-11-19 VITALS
DIASTOLIC BLOOD PRESSURE: 70 MMHG | OXYGEN SATURATION: 99 % | BODY MASS INDEX: 24.61 KG/M2 | WEIGHT: 147.7 LBS | HEIGHT: 65 IN | HEART RATE: 86 BPM | SYSTOLIC BLOOD PRESSURE: 122 MMHG

## 2018-11-19 DIAGNOSIS — E05.90 HYPERTHYROIDISM: Primary | ICD-10-CM

## 2018-11-19 LAB
ALBUMIN SERPL-MCNC: 4.66 G/DL (ref 3.2–4.8)
ALBUMIN/GLOB SERPL: 2.2 G/DL (ref 1.5–2.5)
ALP SERPL-CCNC: 77 U/L (ref 25–100)
ALT SERPL W P-5'-P-CCNC: 47 U/L (ref 7–40)
ANION GAP SERPL CALCULATED.3IONS-SCNC: 6 MMOL/L (ref 3–11)
AST SERPL-CCNC: 27 U/L (ref 0–33)
BASOPHILS # BLD AUTO: 0 10*3/MM3 (ref 0–0.2)
BASOPHILS NFR BLD AUTO: 0 % (ref 0–1)
BILIRUB SERPL-MCNC: 0.4 MG/DL (ref 0.3–1.2)
BUN BLD-MCNC: 12 MG/DL (ref 9–23)
BUN/CREAT SERPL: 24 (ref 7–25)
CALCIUM SPEC-SCNC: 9.5 MG/DL (ref 8.7–10.4)
CHLORIDE SERPL-SCNC: 107 MMOL/L (ref 99–109)
CO2 SERPL-SCNC: 27 MMOL/L (ref 20–31)
CREAT BLD-MCNC: 0.5 MG/DL (ref 0.6–1.3)
DEPRECATED RDW RBC AUTO: 37.7 FL (ref 37–54)
EOSINOPHIL # BLD AUTO: 0.03 10*3/MM3 (ref 0–0.3)
EOSINOPHIL NFR BLD AUTO: 0.7 % (ref 0–3)
ERYTHROCYTE [DISTWIDTH] IN BLOOD BY AUTOMATED COUNT: 12.7 % (ref 11.3–14.5)
GFR SERPL CREATININE-BSD FRML MDRD: 146 ML/MIN/1.73
GLOBULIN UR ELPH-MCNC: 2.1 GM/DL
GLUCOSE BLD-MCNC: 93 MG/DL (ref 70–100)
HCT VFR BLD AUTO: 40 % (ref 34.5–44)
HGB BLD-MCNC: 13.2 G/DL (ref 11.5–15.5)
IMM GRANULOCYTES # BLD: 0 10*3/MM3 (ref 0–0.03)
IMM GRANULOCYTES NFR BLD: 0 % (ref 0–0.6)
LYMPHOCYTES # BLD AUTO: 1.77 10*3/MM3 (ref 0.6–4.8)
LYMPHOCYTES NFR BLD AUTO: 42.7 % (ref 24–44)
MCH RBC QN AUTO: 27 PG (ref 27–31)
MCHC RBC AUTO-ENTMCNC: 33 G/DL (ref 32–36)
MCV RBC AUTO: 81.8 FL (ref 80–99)
MONOCYTES # BLD AUTO: 0.54 10*3/MM3 (ref 0–1)
MONOCYTES NFR BLD AUTO: 13 % (ref 0–12)
NEUTROPHILS # BLD AUTO: 1.81 10*3/MM3 (ref 1.5–8.3)
NEUTROPHILS NFR BLD AUTO: 43.6 % (ref 41–71)
PLATELET # BLD AUTO: 231 10*3/MM3 (ref 150–450)
PMV BLD AUTO: 12.3 FL (ref 6–12)
POTASSIUM BLD-SCNC: 4.8 MMOL/L (ref 3.5–5.5)
PROT SERPL-MCNC: 6.8 G/DL (ref 5.7–8.2)
RBC # BLD AUTO: 4.89 10*6/MM3 (ref 3.89–5.14)
SODIUM BLD-SCNC: 140 MMOL/L (ref 132–146)
T4 FREE SERPL-MCNC: 3.05 NG/DL (ref 0.89–1.76)
TSH SERPL DL<=0.05 MIU/L-ACNC: <0.004 MIU/ML (ref 0.35–5.35)
WBC NRBC COR # BLD: 4.15 10*3/MM3 (ref 3.5–10.8)

## 2018-11-19 PROCEDURE — 90674 CCIIV4 VAC NO PRSV 0.5 ML IM: CPT | Performed by: INTERNAL MEDICINE

## 2018-11-19 PROCEDURE — 80053 COMPREHEN METABOLIC PANEL: CPT | Performed by: INTERNAL MEDICINE

## 2018-11-19 PROCEDURE — 85025 COMPLETE CBC W/AUTO DIFF WBC: CPT | Performed by: INTERNAL MEDICINE

## 2018-11-19 PROCEDURE — 86800 THYROGLOBULIN ANTIBODY: CPT | Performed by: INTERNAL MEDICINE

## 2018-11-19 PROCEDURE — 84439 ASSAY OF FREE THYROXINE: CPT | Performed by: INTERNAL MEDICINE

## 2018-11-19 PROCEDURE — 86376 MICROSOMAL ANTIBODY EACH: CPT | Performed by: INTERNAL MEDICINE

## 2018-11-19 PROCEDURE — 84445 ASSAY OF TSI GLOBULIN: CPT | Performed by: INTERNAL MEDICINE

## 2018-11-19 PROCEDURE — 99213 OFFICE O/P EST LOW 20 MIN: CPT | Performed by: INTERNAL MEDICINE

## 2018-11-19 PROCEDURE — 90471 IMMUNIZATION ADMIN: CPT | Performed by: INTERNAL MEDICINE

## 2018-11-19 PROCEDURE — 84443 ASSAY THYROID STIM HORMONE: CPT | Performed by: INTERNAL MEDICINE

## 2018-11-19 RX ORDER — METHIMAZOLE 5 MG/1
5 TABLET ORAL 2 TIMES DAILY
Qty: 60 TABLET | Refills: 5 | Status: SHIPPED | OUTPATIENT
Start: 2018-11-19 | End: 2018-11-21 | Stop reason: SDUPTHER

## 2018-11-19 NOTE — PROGRESS NOTES
Becky Naqvi 28 y.o.  CC:Follow-up and Hyperthyroidism      [place]: Follow-up and Hyperthyroidism    Is on tapazole 5 mg bid   Is postpartum  Severe fatigue- hard to get up in the mornings  bp is good  Is not breast feeding anymore- has 8 month old and 3 year old  Is on methimazole 5 mg bid     Allergies   Allergen Reactions   • Oxycodone-Acetaminophen    • Propylthiouracil      Neutropenia       Current Outpatient Medications:   •  methIMAzole (TAPAZOLE) 5 MG tablet, Take 1 tablet by mouth 2 (Two) Times a Day., Disp: 60 tablet, Rfl: 5  Patient Active Problem List    Diagnosis   • Delivered by  section [Z38.01]   • Anemia [D64.9]   • Ganglion of hand [M67.449]   • Gastroesophageal reflux disease [K21.9]   • 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   HENT: Negative for congestion, dental problem, drooling, ear discharge, ear pain, facial swelling, hearing loss, mouth sores, nosebleeds, postnasal drip, rhinorrhea, sinus pressure, sneezing, sore throat, tinnitus, trouble swallowing and voice change.    Eyes: Negative for photophobia, pain, discharge, redness, itching and visual disturbance.   Respiratory: Negative for apnea, cough, choking, chest tightness, shortness of breath, wheezing and stridor.    Cardiovascular: Negative for chest pain, palpitations and leg swelling.   Gastrointestinal: Negative for abdominal distention, abdominal pain, anal bleeding, blood in stool, constipation, diarrhea, nausea, rectal pain and vomiting.   Endocrine: Negative for cold intolerance, heat intolerance, polydipsia, polyphagia and polyuria.   Genitourinary: Negative for decreased urine volume, difficulty urinating,  dysuria, enuresis, flank pain, frequency, genital sores, hematuria and urgency.   Musculoskeletal: Negative for arthralgias, back pain, gait problem, joint swelling, myalgias, neck pain and neck stiffness.   Skin: Negative for color change, pallor, rash and wound.   Allergic/Immunologic: Negative for environmental allergies, food allergies and immunocompromised state.   Neurological: Negative for dizziness, tremors, seizures, syncope, facial asymmetry, speech difficulty, weakness, light-headedness, numbness and headaches.   Hematological: Negative for adenopathy. Does not bruise/bleed easily.   Psychiatric/Behavioral: Negative for agitation, behavioral problems, confusion, decreased concentration, dysphoric mood, hallucinations, self-injury, sleep disturbance and suicidal ideas. The patient is nervous/anxious. The patient is not hyperactive.      Social History     Socioeconomic History   • Marital status:      Spouse name: Not on file   • Number of children: Not on file   • Years of education: Not on file   • Highest education level: Not on file   Social Needs   • Financial resource strain: Not on file   • Food insecurity - worry: Not on file   • Food insecurity - inability: Not on file   • Transportation needs - medical: Not on file   • Transportation needs - non-medical: Not on file   Occupational History   • Not on file   Tobacco Use   • Smoking status: Never Smoker   • Smokeless tobacco: Never Used   Substance and Sexual Activity   • Alcohol use: No   • Drug use: No   • Sexual activity: Defer   Other Topics Concern   • Not on file   Social History Narrative   • Not on file     Family History   Problem Relation Age of Onset   • Arthritis Other    • Hyperlipidemia Other    • Thyroid disease Other    • Heart attack Other    • Hypertension Other    • Lung cancer Other    • Lung cancer Other    • Hypertension Father    • Lung cancer Paternal Uncle    • Heart attack Maternal Grandmother    • Lung cancer  "Paternal Grandmother      /70   Pulse 86   Ht 165.1 cm (65\")   Wt 67 kg (147 lb 11.2 oz)   SpO2 99%   BMI 24.58 kg/m²   Physical Exam   Constitutional: She is oriented to person, place, and time. She appears well-developed and well-nourished.   HENT:   Head: Normocephalic and atraumatic.   Nose: Nose normal.   Mouth/Throat: Oropharynx is clear and moist.   Eyes: Conjunctivae, EOM and lids are normal. Pupils are equal, round, and reactive to light.   Neck: Trachea normal and normal range of motion. Neck supple. Carotid bruit is not present. No tracheal deviation present. Thyromegaly present. No thyroid mass present.   Cardiovascular: Normal rate, regular rhythm, normal heart sounds and intact distal pulses. Exam reveals no gallop and no friction rub.   No murmur heard.  Pulmonary/Chest: Effort normal and breath sounds normal. No respiratory distress. She has no wheezes.   Musculoskeletal: Normal range of motion. She exhibits no edema or deformity.   Lymphadenopathy:     She has no cervical adenopathy.   Neurological: She is alert and oriented to person, place, and time. She has normal reflexes. She displays normal reflexes. No cranial nerve deficit.   Skin: Skin is warm and dry. No rash noted. No cyanosis or erythema. Nails show no clubbing.   Psychiatric: She has a normal mood and affect. Her speech is normal and behavior is normal. Judgment and thought content normal. Cognition and memory are normal.   Nursing note and vitals reviewed.    Results for orders placed or performed in visit on 09/24/18   Comprehensive Metabolic Panel   Result Value Ref Range    Glucose 94 70 - 100 mg/dL    BUN 12 9 - 23 mg/dL    Creatinine 0.51 (L) 0.60 - 1.30 mg/dL    Sodium 144 132 - 146 mmol/L    Potassium 4.7 3.5 - 5.5 mmol/L    Chloride 107 99 - 109 mmol/L    CO2 26.0 20.0 - 31.0 mmol/L    Calcium 9.7 8.7 - 10.4 mg/dL    Total Protein 6.6 5.7 - 8.2 g/dL    Albumin 4.43 3.20 - 4.80 g/dL    ALT (SGPT) 44 (H) 7 - 40 U/L    " AST (SGOT) 28 0 - 33 U/L    Alkaline Phosphatase 69 25 - 100 U/L    Total Bilirubin 0.4 0.3 - 1.2 mg/dL    eGFR Non African Amer 143 >60 mL/min/1.73    Globulin 2.2 gm/dL    A/G Ratio 2.0 1.5 - 2.5 g/dL    BUN/Creatinine Ratio 23.5 7.0 - 25.0    Anion Gap 11.0 3.0 - 11.0 mmol/L   CBC Auto Differential   Result Value Ref Range    WBC 3.91 3.50 - 10.80 10*3/mm3    RBC 4.55 3.89 - 5.14 10*6/mm3    Hemoglobin 12.4 11.5 - 15.5 g/dL    Hematocrit 37.6 34.5 - 44.0 %    MCV 82.6 80.0 - 99.0 fL    MCH 27.3 27.0 - 31.0 pg    MCHC 33.0 32.0 - 36.0 g/dL    RDW 12.0 11.3 - 14.5 %    RDW-SD 36.3 (L) 37.0 - 54.0 fl    MPV 12.2 (H) 6.0 - 12.0 fL    Platelets 187 150 - 450 10*3/mm3    Neutrophil % 43.9 41.0 - 71.0 %    Lymphocyte % 42.2 24.0 - 44.0 %    Monocyte % 12.3 (H) 0.0 - 12.0 %    Eosinophil % 1.0 0.0 - 3.0 %    Basophil % 0.3 0.0 - 1.0 %    Immature Grans % 0.3 0.0 - 0.6 %    Neutrophils, Absolute 1.72 1.50 - 8.30 10*3/mm3    Lymphocytes, Absolute 1.65 0.60 - 4.80 10*3/mm3    Monocytes, Absolute 0.48 0.00 - 1.00 10*3/mm3    Eosinophils, Absolute 0.04 0.00 - 0.30 10*3/mm3    Basophils, Absolute 0.01 0.00 - 0.20 10*3/mm3    Immature Grans, Absolute 0.01 0.00 - 0.03 10*3/mm3   T4, Free   Result Value Ref Range    Free T4 3.16 (H) 0.89 - 1.76 ng/dL   TSH   Result Value Ref Range    TSH 0.006 (L) 0.350 - 5.350 mIU/mL     Problem List Items Addressed This Visit        Endocrine    Hyperthyroidism - Primary     Due to graves disease- now pp   Has corneal injection   Goiter right greater than left   Check cbc cmp and tfts  H/o mild leukopenia with anti thyroid drugs  Fatigue, hard to wake up   If poor sleep then mm spasm and tremor worse  F/u yearly with interim lab work   Stable exam clinically          Relevant Medications    methIMAzole (TAPAZOLE) 5 MG tablet    Other Relevant Orders    Comprehensive Metabolic Panel    CBC Auto Differential    T4, Free    TSH    Thyroid Stimulating Immunoglobulin    Thyroid Antibodies         Return in about 1 year (around 11/19/2019) for Recheck 30 min .    Jumana Noguera MA  Signed Marni Pan MD

## 2018-11-19 NOTE — ASSESSMENT & PLAN NOTE
Due to graves disease- now pp   Has corneal injection   Goiter right greater than left   Check cbc cmp and tfts  H/o mild leukopenia with anti thyroid drugs  Fatigue, hard to wake up   If poor sleep then mm spasm and tremor worse  F/u yearly with interim lab work   Stable exam clinically

## 2018-11-21 ENCOUNTER — TELEPHONE (OUTPATIENT)
Dept: ENDOCRINOLOGY | Facility: CLINIC | Age: 28
End: 2018-11-21

## 2018-11-21 LAB
THYROGLOB AB SERPL-ACNC: <1 IU/ML (ref 0–0.9)
THYROPEROXIDASE AB SERPL-ACNC: 16 IU/ML (ref 0–34)
TSI SER-MCNC: 25.3 IU/L (ref 0–0.55)

## 2018-11-21 RX ORDER — METHIMAZOLE 5 MG/1
TABLET ORAL
Qty: 90 TABLET | Refills: 5 | Status: SHIPPED | OUTPATIENT
Start: 2018-11-21 | End: 2019-05-26 | Stop reason: SDUPTHER

## 2018-12-07 ENCOUNTER — TELEPHONE (OUTPATIENT)
Dept: INTERNAL MEDICINE | Facility: CLINIC | Age: 28
End: 2018-12-07

## 2018-12-07 NOTE — TELEPHONE ENCOUNTER
Pt called and said she had been diagnosed by her PCP with a Sinus infection and ear infection.  They advised her to take   Musinex, dayquil and nitequil.    She wanted to check with Dr Pan to make sure that was okay for her to take.    Please call her back at [phone]

## 2018-12-07 NOTE — TELEPHONE ENCOUNTER
Patient was advised that taking decongestants will make her overactive thyroid symptoms worse including increased heart rate, insomnia etc.  She was advised to take plain mucinex and avoid decongestants if possible  She voiced understanding

## 2019-01-24 ENCOUNTER — TELEPHONE (OUTPATIENT)
Dept: INTERNAL MEDICINE | Facility: CLINIC | Age: 29
End: 2019-01-24

## 2019-01-24 DIAGNOSIS — E05.90 HYPERTHYROIDISM: Primary | ICD-10-CM

## 2019-01-24 LAB
ALBUMIN SERPL-MCNC: 4.63 G/DL (ref 3.2–4.8)
ALBUMIN/GLOB SERPL: 2.1 G/DL (ref 1.5–2.5)
ALP SERPL-CCNC: 86 U/L (ref 25–100)
ALT SERPL W P-5'-P-CCNC: 36 U/L (ref 7–40)
ANION GAP SERPL CALCULATED.3IONS-SCNC: 6 MMOL/L (ref 3–11)
AST SERPL-CCNC: 23 U/L (ref 0–33)
BASOPHILS # BLD AUTO: 0.02 10*3/MM3 (ref 0–0.2)
BASOPHILS NFR BLD AUTO: 0.4 % (ref 0–1)
BILIRUB SERPL-MCNC: 0.5 MG/DL (ref 0.3–1.2)
BUN BLD-MCNC: 10 MG/DL (ref 9–23)
BUN/CREAT SERPL: 15.9 (ref 7–25)
CALCIUM SPEC-SCNC: 10 MG/DL (ref 8.7–10.4)
CHLORIDE SERPL-SCNC: 107 MMOL/L (ref 99–109)
CO2 SERPL-SCNC: 26 MMOL/L (ref 20–31)
CREAT BLD-MCNC: 0.63 MG/DL (ref 0.6–1.3)
DEPRECATED RDW RBC AUTO: 38.6 FL (ref 37–54)
EOSINOPHIL # BLD AUTO: 0.06 10*3/MM3 (ref 0–0.3)
EOSINOPHIL NFR BLD AUTO: 1.3 % (ref 0–3)
ERYTHROCYTE [DISTWIDTH] IN BLOOD BY AUTOMATED COUNT: 12.7 % (ref 11.3–14.5)
GFR SERPL CREATININE-BSD FRML MDRD: 112 ML/MIN/1.73
GLOBULIN UR ELPH-MCNC: 2.2 GM/DL
GLUCOSE BLD-MCNC: 91 MG/DL (ref 70–100)
HCT VFR BLD AUTO: 39.9 % (ref 34.5–44)
HGB BLD-MCNC: 13.2 G/DL (ref 11.5–15.5)
IMM GRANULOCYTES # BLD AUTO: 0 10*3/MM3 (ref 0–0.03)
IMM GRANULOCYTES NFR BLD AUTO: 0 % (ref 0–0.6)
LYMPHOCYTES # BLD AUTO: 1.95 10*3/MM3 (ref 0.6–4.8)
LYMPHOCYTES NFR BLD AUTO: 42.5 % (ref 24–44)
MCH RBC QN AUTO: 27.7 PG (ref 27–31)
MCHC RBC AUTO-ENTMCNC: 33.1 G/DL (ref 32–36)
MCV RBC AUTO: 83.6 FL (ref 80–99)
MONOCYTES # BLD AUTO: 0.45 10*3/MM3 (ref 0–1)
MONOCYTES NFR BLD AUTO: 9.8 % (ref 0–12)
NEUTROPHILS # BLD AUTO: 2.11 10*3/MM3 (ref 1.5–8.3)
NEUTROPHILS NFR BLD AUTO: 46 % (ref 41–71)
PLATELET # BLD AUTO: 230 10*3/MM3 (ref 150–450)
PMV BLD AUTO: 12.1 FL (ref 6–12)
POTASSIUM BLD-SCNC: 4.9 MMOL/L (ref 3.5–5.5)
PROT SERPL-MCNC: 6.8 G/DL (ref 5.7–8.2)
RBC # BLD AUTO: 4.77 10*6/MM3 (ref 3.89–5.14)
SODIUM BLD-SCNC: 139 MMOL/L (ref 132–146)
T4 FREE SERPL-MCNC: 2.36 NG/DL (ref 0.89–1.76)
TSH SERPL DL<=0.05 MIU/L-ACNC: 0 MIU/ML (ref 0.35–5.35)
WBC NRBC COR # BLD: 4.59 10*3/MM3 (ref 3.5–10.8)

## 2019-01-24 PROCEDURE — 85025 COMPLETE CBC W/AUTO DIFF WBC: CPT | Performed by: INTERNAL MEDICINE

## 2019-01-24 PROCEDURE — 80053 COMPREHEN METABOLIC PANEL: CPT | Performed by: INTERNAL MEDICINE

## 2019-01-24 PROCEDURE — 84439 ASSAY OF FREE THYROXINE: CPT | Performed by: INTERNAL MEDICINE

## 2019-01-24 PROCEDURE — 84443 ASSAY THYROID STIM HORMONE: CPT | Performed by: INTERNAL MEDICINE

## 2019-01-24 PROCEDURE — 84445 ASSAY OF TSI GLOBULIN: CPT | Performed by: INTERNAL MEDICINE

## 2019-01-26 LAB — TSI SER-MCNC: 13.7 IU/L (ref 0–0.55)

## 2019-03-14 ENCOUNTER — TELEPHONE (OUTPATIENT)
Dept: ENDOCRINOLOGY | Facility: CLINIC | Age: 29
End: 2019-03-14

## 2019-03-14 NOTE — TELEPHONE ENCOUNTER
PATIENT HAS BAD COUGH, PRIMARY CARE PROVIDER WANTS TO START HER ON PREDNISONE, SHE WANTS TO KNOW IF IT IS OK TO TAKE WITH HER GRAVES DISEASE. PT CAN BE REACHED -[phone]

## 2019-05-26 RX ORDER — METHIMAZOLE 5 MG/1
TABLET ORAL
Qty: 90 TABLET | Refills: 4 | Status: SHIPPED | OUTPATIENT
Start: 2019-05-26 | End: 2019-11-19 | Stop reason: SDUPTHER

## 2019-11-19 ENCOUNTER — OFFICE VISIT (OUTPATIENT)
Dept: ENDOCRINOLOGY | Facility: CLINIC | Age: 29
End: 2019-11-19

## 2019-11-19 VITALS
WEIGHT: 161.4 LBS | SYSTOLIC BLOOD PRESSURE: 110 MMHG | BODY MASS INDEX: 26.86 KG/M2 | HEART RATE: 65 BPM | DIASTOLIC BLOOD PRESSURE: 60 MMHG

## 2019-11-19 DIAGNOSIS — E05.90 HYPERTHYROIDISM: Primary | ICD-10-CM

## 2019-11-19 LAB
BASOPHILS # BLD AUTO: 0.03 10*3/MM3 (ref 0–0.2)
BASOPHILS NFR BLD AUTO: 0.7 % (ref 0–1.5)
DEPRECATED RDW RBC AUTO: 38.8 FL (ref 37–54)
EOSINOPHIL # BLD AUTO: 0.07 10*3/MM3 (ref 0–0.4)
EOSINOPHIL NFR BLD AUTO: 1.6 % (ref 0.3–6.2)
ERYTHROCYTE [DISTWIDTH] IN BLOOD BY AUTOMATED COUNT: 12.3 % (ref 12.3–15.4)
HCT VFR BLD AUTO: 36 % (ref 34–46.6)
HGB BLD-MCNC: 12 G/DL (ref 12–15.9)
IMM GRANULOCYTES # BLD AUTO: 0.01 10*3/MM3 (ref 0–0.05)
IMM GRANULOCYTES NFR BLD AUTO: 0.2 % (ref 0–0.5)
LYMPHOCYTES # BLD AUTO: 2.08 10*3/MM3 (ref 0.7–3.1)
LYMPHOCYTES NFR BLD AUTO: 46.4 % (ref 19.6–45.3)
MCH RBC QN AUTO: 28.8 PG (ref 26.6–33)
MCHC RBC AUTO-ENTMCNC: 33.3 G/DL (ref 31.5–35.7)
MCV RBC AUTO: 86.3 FL (ref 79–97)
MONOCYTES # BLD AUTO: 0.34 10*3/MM3 (ref 0.1–0.9)
MONOCYTES NFR BLD AUTO: 7.6 % (ref 5–12)
NEUTROPHILS # BLD AUTO: 1.95 10*3/MM3 (ref 1.7–7)
NEUTROPHILS NFR BLD AUTO: 43.5 % (ref 42.7–76)
NRBC BLD AUTO-RTO: 0 /100 WBC (ref 0–0.2)
PLATELET # BLD AUTO: 224 10*3/MM3 (ref 140–450)
PMV BLD AUTO: 12.6 FL (ref 6–12)
RBC # BLD AUTO: 4.17 10*6/MM3 (ref 3.77–5.28)
WBC NRBC COR # BLD: 4.48 10*3/MM3 (ref 3.4–10.8)

## 2019-11-19 PROCEDURE — 99213 OFFICE O/P EST LOW 20 MIN: CPT | Performed by: INTERNAL MEDICINE

## 2019-11-19 PROCEDURE — 84443 ASSAY THYROID STIM HORMONE: CPT | Performed by: INTERNAL MEDICINE

## 2019-11-19 PROCEDURE — 84445 ASSAY OF TSI GLOBULIN: CPT | Performed by: INTERNAL MEDICINE

## 2019-11-19 PROCEDURE — 84439 ASSAY OF FREE THYROXINE: CPT | Performed by: INTERNAL MEDICINE

## 2019-11-19 PROCEDURE — 85025 COMPLETE CBC W/AUTO DIFF WBC: CPT | Performed by: INTERNAL MEDICINE

## 2019-11-19 PROCEDURE — 80053 COMPREHEN METABOLIC PANEL: CPT | Performed by: INTERNAL MEDICINE

## 2019-11-19 RX ORDER — METHIMAZOLE 5 MG/1
TABLET ORAL
Qty: 270 TABLET | Refills: 1 | Status: SHIPPED | OUTPATIENT
Start: 2019-11-19 | End: 2020-12-07

## 2019-11-19 NOTE — PROGRESS NOTES
Becky Naqvi 29 y.o.  CC:Follow-up and Hyperthyroidism (usually only takes 2 methimazole per day)      [place]: Follow-up and Hyperthyroidism (usually only takes 2 methimazole per day)    Is taking methimazole 10 mg daily   No new c/o - doing well overall   Forgets 3rd dosing regularly     Allergies   Allergen Reactions   • Oxycodone-Acetaminophen    • Propylthiouracil      Neutropenia       Current Outpatient Medications:   •  methIMAzole (TAPAZOLE) 5 MG tablet, TAKE 2 IN THE MORNING AND 1 IN THE EVENING, Disp: 270 tablet, Rfl: 1  Patient Active Problem List    Diagnosis   • Delivered by  section [Z38.01]   • Anemia [D64.9]   • Ganglion of hand [M67.449]   • Gastroesophageal reflux disease [K21.9]   • 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   HENT: Negative for congestion, dental problem, drooling, ear discharge, ear pain, facial swelling, hearing loss, mouth sores, nosebleeds, postnasal drip, rhinorrhea, sinus pressure, sneezing, sore throat, tinnitus, trouble swallowing and voice change.    Eyes: Negative for photophobia, pain, discharge, redness, itching and visual disturbance.   Respiratory: Negative for apnea, cough, choking, chest tightness, shortness of breath, wheezing and stridor.    Cardiovascular: Negative for chest pain, palpitations and leg swelling.   Gastrointestinal: Negative for abdominal distention, abdominal pain, anal bleeding, blood in stool, constipation, diarrhea, nausea, rectal pain and vomiting.   Endocrine: Negative for cold intolerance, heat intolerance, polydipsia, polyphagia and polyuria.   Genitourinary: Negative for decreased urine volume, difficulty urinating, dysuria,  enuresis, flank pain, frequency, genital sores, hematuria and urgency.   Musculoskeletal: Negative for arthralgias, back pain, gait problem, joint swelling, myalgias, neck pain and neck stiffness.   Skin: Negative for color change, pallor, rash and wound.   Allergic/Immunologic: Negative for environmental allergies, food allergies and immunocompromised state.   Neurological: Negative for dizziness, tremors, seizures, syncope, facial asymmetry, speech difficulty, weakness, light-headedness, numbness and headaches.   Hematological: Negative for adenopathy. Does not bruise/bleed easily.   Psychiatric/Behavioral: Negative for agitation, behavioral problems, confusion, decreased concentration, dysphoric mood, hallucinations, self-injury, sleep disturbance and suicidal ideas. The patient is not nervous/anxious and is not hyperactive.      Social History     Socioeconomic History   • Marital status:      Spouse name: Not on file   • Number of children: Not on file   • Years of education: Not on file   • Highest education level: Not on file   Tobacco Use   • Smoking status: Never Smoker   • Smokeless tobacco: Never Used   Substance and Sexual Activity   • Alcohol use: No   • Drug use: No   • Sexual activity: Defer     Family History   Problem Relation Age of Onset   • Arthritis Other    • Hyperlipidemia Other    • Thyroid disease Other    • Heart attack Other    • Hypertension Other    • Lung cancer Other    • Lung cancer Other    • Hypertension Father    • Lung cancer Paternal Uncle    • Heart attack Maternal Grandmother    • Lung cancer Paternal Grandmother      /60   Pulse 65   Wt 73.2 kg (161 lb 6.4 oz)   PF 99 L/min   BMI 26.86 kg/m²   Physical Exam   Constitutional: She is oriented to person, place, and time. She appears well-developed and well-nourished.   HENT:   Head: Normocephalic and atraumatic.   Nose: Nose normal.   Mouth/Throat: Oropharynx is clear and moist.   Eyes: Conjunctivae, EOM and lids  are normal. Pupils are equal, round, and reactive to light.   Neck: Trachea normal and normal range of motion. Neck supple. Carotid bruit is not present. No tracheal deviation present. No thyroid mass and no thyromegaly present.   Cardiovascular: Normal rate, regular rhythm, normal heart sounds and intact distal pulses. Exam reveals no gallop and no friction rub.   No murmur heard.  Pulmonary/Chest: Effort normal and breath sounds normal. No respiratory distress. She has no wheezes.   Musculoskeletal: Normal range of motion. She exhibits no edema or deformity.   Lymphadenopathy:     She has no cervical adenopathy.   Neurological: She is alert and oriented to person, place, and time. She has normal reflexes. She displays normal reflexes. No cranial nerve deficit.   Skin: Skin is warm and dry. No rash noted. No cyanosis or erythema. Nails show no clubbing.   Psychiatric: She has a normal mood and affect. Her speech is normal and behavior is normal. Judgment and thought content normal. Cognition and memory are normal.   Nursing note and vitals reviewed.    Results for orders placed or performed in visit on 01/24/19   Thyroid Stimulating Immunoglobulin   Result Value Ref Range    Thyroid Stimulating Immunoglobulin 13.70 (H) 0.00 - 0.55 IU/L   Comprehensive Metabolic Panel   Result Value Ref Range    Glucose 91 70 - 100 mg/dL    BUN 10 9 - 23 mg/dL    Creatinine 0.63 0.60 - 1.30 mg/dL    Sodium 139 132 - 146 mmol/L    Potassium 4.9 3.5 - 5.5 mmol/L    Chloride 107 99 - 109 mmol/L    CO2 26.0 20.0 - 31.0 mmol/L    Calcium 10.0 8.7 - 10.4 mg/dL    Total Protein 6.8 5.7 - 8.2 g/dL    Albumin 4.63 3.20 - 4.80 g/dL    ALT (SGPT) 36 7 - 40 U/L    AST (SGOT) 23 0 - 33 U/L    Alkaline Phosphatase 86 25 - 100 U/L    Total Bilirubin 0.5 0.3 - 1.2 mg/dL    eGFR Non African Amer 112 >60 mL/min/1.73    Globulin 2.2 gm/dL    A/G Ratio 2.1 1.5 - 2.5 g/dL    BUN/Creatinine Ratio 15.9 7.0 - 25.0    Anion Gap 6.0 3.0 - 11.0 mmol/L   T4,  Free   Result Value Ref Range    Free T4 2.36 (H) 0.89 - 1.76 ng/dL   TSH   Result Value Ref Range    TSH 0.004 (L) 0.350 - 5.350 mIU/mL   CBC Auto Differential   Result Value Ref Range    WBC 4.59 3.50 - 10.80 10*3/mm3    RBC 4.77 3.89 - 5.14 10*6/mm3    Hemoglobin 13.2 11.5 - 15.5 g/dL    Hematocrit 39.9 34.5 - 44.0 %    MCV 83.6 80.0 - 99.0 fL    MCH 27.7 27.0 - 31.0 pg    MCHC 33.1 32.0 - 36.0 g/dL    RDW 12.7 11.3 - 14.5 %    RDW-SD 38.6 37.0 - 54.0 fl    MPV 12.1 (H) 6.0 - 12.0 fL    Platelets 230 150 - 450 10*3/mm3    Neutrophil % 46.0 41.0 - 71.0 %    Lymphocyte % 42.5 24.0 - 44.0 %    Monocyte % 9.8 0.0 - 12.0 %    Eosinophil % 1.3 0.0 - 3.0 %    Basophil % 0.4 0.0 - 1.0 %    Immature Grans % 0.0 0.0 - 0.6 %    Neutrophils, Absolute 2.11 1.50 - 8.30 10*3/mm3    Lymphocytes, Absolute 1.95 0.60 - 4.80 10*3/mm3    Monocytes, Absolute 0.45 0.00 - 1.00 10*3/mm3    Eosinophils, Absolute 0.06 0.00 - 0.30 10*3/mm3    Basophils, Absolute 0.02 0.00 - 0.20 10*3/mm3    Immature Grans, Absolute 0.00 0.00 - 0.03 10*3/mm3     Problem List Items Addressed This Visit        Endocrine    Hyperthyroidism - Primary     Check cbc, cmp and tfts  Is on methimazole 10 mg daily   F/u 6 months          Relevant Orders    Comprehensive Metabolic Panel    CBC Auto Differential    T4, Free    TSH    Thyroid Stimulating Immunoglobulin        Return in about 6 months (around 5/19/2020).    Jumana Noguera MA  Signed Marni Pan MD

## 2019-11-20 LAB
ALBUMIN SERPL-MCNC: 4.4 G/DL (ref 3.5–5.2)
ALBUMIN/GLOB SERPL: 1.5 G/DL
ALP SERPL-CCNC: 55 U/L (ref 39–117)
ALT SERPL W P-5'-P-CCNC: 23 U/L (ref 1–33)
ANION GAP SERPL CALCULATED.3IONS-SCNC: 12.4 MMOL/L (ref 5–15)
AST SERPL-CCNC: 24 U/L (ref 1–32)
BILIRUB SERPL-MCNC: 0.2 MG/DL (ref 0.2–1.2)
BUN BLD-MCNC: 11 MG/DL (ref 6–20)
BUN/CREAT SERPL: 13.1 (ref 7–25)
CALCIUM SPEC-SCNC: 9.4 MG/DL (ref 8.6–10.5)
CHLORIDE SERPL-SCNC: 105 MMOL/L (ref 98–107)
CO2 SERPL-SCNC: 23.6 MMOL/L (ref 22–29)
CREAT BLD-MCNC: 0.84 MG/DL (ref 0.57–1)
GFR SERPL CREATININE-BSD FRML MDRD: 80 ML/MIN/1.73
GLOBULIN UR ELPH-MCNC: 2.9 GM/DL
GLUCOSE BLD-MCNC: 92 MG/DL (ref 65–99)
POTASSIUM BLD-SCNC: 4.5 MMOL/L (ref 3.5–5.2)
PROT SERPL-MCNC: 7.3 G/DL (ref 6–8.5)
SODIUM BLD-SCNC: 141 MMOL/L (ref 136–145)
T4 FREE SERPL-MCNC: 0.99 NG/DL (ref 0.93–1.7)
TSH SERPL DL<=0.05 MIU/L-ACNC: 0.39 UIU/ML (ref 0.27–4.2)

## 2019-11-22 LAB — TSI SER-MCNC: 1.78 IU/L (ref 0–0.55)

## 2019-11-30 NOTE — ASSESSMENT & PLAN NOTE
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 Etiology of seizures likely structural 2/2 to the R. parieto-occipital enhancement which was treated as a viral encephalitis in September/October 2019. Patient now here for focal seizures presumed to be secondary to ?autoimmune encephalitis Completed IVIG and solumedrol x5 days, currently on steroid taper.     Plan: He appears to have autoimmune encephalitis as the cause for his seizures. He tested postive for Toxoplasma in CSF, but low suspicion for toxoplasmosis.     Labs  [x] repeat LP done on 11/20, studies sent: gluc 98, protein 42, lymph predom 90%, gram stain neg, HSV negative, CSF PCR negative, flow cytometry negative. WNV IgG + in CSF, IgM negative, fungal cx negative, errlichia negative, lyme negative, borrelia negative, ACE negative, beta 2 microglobulin wnl, crypto neg, acid fast negative, CSF PCR negative for West Nile, MBP negative, + toxo plasma, - EBV, - EMV, - VZV  [] pending CSF studies: VDRL, histoplasma, ENCES,  [x] repeat MRI brain with and without contrast- no enhancement seen, limited by motion  [x] HIV negative, TPO negative, thyroglobulin negative  [x] ESR, CRP elevated 5.62, B12 wnl, TSH wnl, folate wnl    [x] Anti MUSK, Anti achR ab negative  [] [place] [facility] encephalitis panel sent  [x] thrombocytosis likely reactive, will monitor Hgb, if continues to decr will get FOBT  [x] started on salt tabs for hyponatremia.     Consults  [x] heme onc note for thrombocytosis: studies sent, consult appreciated  [x] ID consulted 11/28 for positive toxoplasma in the CSF - Bactrim started on 11/28. will need to d/w ID regarding continuing Bactrim.    Meds  [] steroid taper regimen: 60 x1 week (starting 11/28), then 40 x1 week, then 20 x1 week, then 10 x1 week.   [x] Continue Keppra 1.5g BID  [x] Continue Vimpat 200 BID  [x] Completed IVIG and solumedrol     Imaging  [] MRI brain w, wo pending to assess for lesions (due to + toxo in CSF) - no enhancement seen, limited by motion  [x] Repeat MRI brain w/ and w/o under anesthesia: looks improved  [x] rEEG: potential epileptogenic focus in R temporal, R centroparietal, R temporo-occipital regions, mild-mod dysfunction, no seizure seen.   [x] CT C/A/P stool impaction, mild esophagitis and debris in trachea  [x] 24 hr vEEG negative for seizures     Other  [x] Pt/OT: for MAEVE  [x] S/s failed. 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 Etiology of seizures likely structural 2/2 to the R. parieto-occipital enhancement which was treated as a viral encephalitis in September/October 2019. Patient now here for focal seizures presumed to be secondary to ?autoimmune encephalitis Completed IVIG and solumedrol x5 days, currently on steroid taper.     Plan: He appears to have autoimmune encephalitis as the cause for his seizures. He tested postive for Toxoplasma in CSF, but low suspicion for toxoplasmosis.     Labs  [x] repeat LP done on 11/20, studies sent: gluc 98, protein 42, lymph predom 90%, gram stain neg, HSV negative, CSF PCR negative, flow cytometry negative. WNV IgG + in CSF, IgM negative, fungal cx negative, errlichia negative, lyme negative, borrelia negative, ACE negative, beta 2 microglobulin wnl, crypto neg, acid fast negative, CSF PCR negative for West Nile, MBP negative, + toxo plasma, - EBV, - EMV, - VZV  [] pending CSF studies: VDRL, histoplasma, ENCES,  [x] repeat MRI brain with and without contrast- no enhancement seen, limited by motion  [x] HIV negative, TPO negative, thyroglobulin negative  [x] ESR, CRP elevated 5.62, B12 wnl, TSH wnl, folate wnl    [x] Anti MUSK, Anti achR ab negative  [] [facility] encephalitis panel sent  [x] thrombocytosis likely reactive, will monitor Hgb, if continues to decr will get FOBT  [] discuss need for toxoplasma treatment with ID  [x] started on salt tabs for hyponatremia.     Consults  [x] heme onc note for thrombocytosis: studies sent, consult appreciated  [x] ID consulted 11/28 for positive toxoplasma in the CSF - Bactrim started on 11/28. will need to d/w ID regarding continuing Bactrim.    Meds  [] steroid taper regimen: 60 x1 week (starting 11/28), then 40 x1 week, then 20 x1 week, then 10 x1 week.   [x] Continue Keppra 1.5g BID  [x] Continue Vimpat 200 BID  [x] Completed IVIG and solumedrol     Imaging  [] MRI brain w, wo pending to assess for lesions (due to + toxo in CSF) - no enhancement seen, limited by motion  [x] Repeat MRI brain w/ and w/o under anesthesia: looks improved  [x] rEEG: potential epileptogenic focus in R temporal, R centroparietal, R temporo-occipital regions, mild-mod dysfunction, no seizure seen.   [x] CT C/A/P stool impaction, mild esophagitis and debris in trachea  [x] 24 hr vEEG negative for seizures     Other  [x] Pt/OT: for MAEVE  [x] S/s failed. continue TF

## 2019-12-27 ENCOUNTER — OFFICE VISIT (OUTPATIENT)
Dept: RETAIL CLINIC | Facility: CLINIC | Age: 29
End: 2019-12-27

## 2019-12-27 VITALS
HEIGHT: 65 IN | RESPIRATION RATE: 18 BRPM | OXYGEN SATURATION: 98 % | HEART RATE: 62 BPM | WEIGHT: 158 LBS | SYSTOLIC BLOOD PRESSURE: 90 MMHG | DIASTOLIC BLOOD PRESSURE: 58 MMHG | TEMPERATURE: 98 F | BODY MASS INDEX: 26.33 KG/M2

## 2019-12-27 DIAGNOSIS — J40 BRONCHITIS: Primary | ICD-10-CM

## 2019-12-27 PROCEDURE — 99213 OFFICE O/P EST LOW 20 MIN: CPT | Performed by: NURSE PRACTITIONER

## 2019-12-27 RX ORDER — METHYLPREDNISOLONE 4 MG/1
TABLET ORAL
Qty: 21 EACH | Refills: 0 | Status: SHIPPED | OUTPATIENT
Start: 2019-12-27 | End: 2020-07-10

## 2019-12-27 RX ORDER — ALBUTEROL SULFATE 90 UG/1
2 AEROSOL, METERED RESPIRATORY (INHALATION) EVERY 4 HOURS PRN
Qty: 1 INHALER | Refills: 0 | Status: SHIPPED | OUTPATIENT
Start: 2019-12-27 | End: 2020-07-10

## 2019-12-27 RX ORDER — GUAIFENESIN AND DEXTROMETHORPHAN HYDROBROMIDE 600; 30 MG/1; MG/1
1 TABLET, EXTENDED RELEASE ORAL 2 TIMES DAILY PRN
Qty: 28 TABLET | Refills: 0 | Status: SHIPPED | OUTPATIENT
Start: 2019-12-27 | End: 2020-07-10

## 2019-12-27 RX ORDER — BENZONATATE 100 MG/1
100 CAPSULE ORAL 3 TIMES DAILY PRN
Qty: 30 CAPSULE | Refills: 0 | Status: SHIPPED | OUTPATIENT
Start: 2019-12-27 | End: 2020-07-10

## 2020-07-10 ENCOUNTER — LAB (OUTPATIENT)
Dept: LAB | Facility: HOSPITAL | Age: 30
End: 2020-07-10

## 2020-07-10 ENCOUNTER — OFFICE VISIT (OUTPATIENT)
Dept: ENDOCRINOLOGY | Facility: CLINIC | Age: 30
End: 2020-07-10

## 2020-07-10 VITALS
BODY MASS INDEX: 25.86 KG/M2 | HEART RATE: 72 BPM | DIASTOLIC BLOOD PRESSURE: 70 MMHG | OXYGEN SATURATION: 98 % | SYSTOLIC BLOOD PRESSURE: 110 MMHG | WEIGHT: 155.2 LBS | HEIGHT: 65 IN

## 2020-07-10 DIAGNOSIS — R93.89 ABNORMAL IMAGING OF THYROID: ICD-10-CM

## 2020-07-10 DIAGNOSIS — E05.90 HYPERTHYROIDISM: Primary | ICD-10-CM

## 2020-07-10 PROCEDURE — 85025 COMPLETE CBC W/AUTO DIFF WBC: CPT | Performed by: INTERNAL MEDICINE

## 2020-07-10 PROCEDURE — 84445 ASSAY OF TSI GLOBULIN: CPT | Performed by: INTERNAL MEDICINE

## 2020-07-10 PROCEDURE — 84443 ASSAY THYROID STIM HORMONE: CPT | Performed by: INTERNAL MEDICINE

## 2020-07-10 PROCEDURE — 80053 COMPREHEN METABOLIC PANEL: CPT | Performed by: INTERNAL MEDICINE

## 2020-07-10 PROCEDURE — 84439 ASSAY OF FREE THYROXINE: CPT | Performed by: INTERNAL MEDICINE

## 2020-07-10 PROCEDURE — 99213 OFFICE O/P EST LOW 20 MIN: CPT | Performed by: INTERNAL MEDICINE

## 2020-07-10 NOTE — PROGRESS NOTES
Becky Naqvi 30 y.o.  CC: Hyperthyroidism (Follow uP)      [place]: Hyperthyroidism (Follow uP)    Is on tapazole- reviewed last lab work - tsi 3 x uln    She has been taking 1 pill three days a week on average   Energy is good overall  Not planning on expanding family any time soon but I did remind her that she would need to discontinue tapazole prior to conception   Also had neck xray and thyroid calcification noted (plain films)  Discussed additional imaging     Allergies   Allergen Reactions   • Oxycodone-Acetaminophen    • Propylthiouracil      Neutropenia       Current Outpatient Medications:   •  methIMAzole (TAPAZOLE) 5 MG tablet, TAKE 2 IN THE MORNING AND 1 IN THE EVENING, Disp: 270 tablet, Rfl: 1  Patient Active Problem List    Diagnosis   • Abnormal imaging of thyroid [R93.89]   • Delivered by  section [Z38.01]   • Anemia [D64.9]   • Ganglion of hand [M67.449]   • Gastroesophageal reflux disease [K21.9]   • 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   HENT: Negative for congestion, dental problem, drooling, ear discharge, ear pain, facial swelling, hearing loss, mouth sores, nosebleeds, postnasal drip, rhinorrhea, sinus pressure, sneezing, sore throat, tinnitus, trouble swallowing and voice change.    Eyes: Negative for photophobia, pain, discharge, redness, itching and visual disturbance.   Respiratory: Negative for apnea, cough, choking, chest tightness, shortness of breath, wheezing and stridor.    Cardiovascular: Negative for chest pain, palpitations and leg swelling.   Gastrointestinal: Negative for abdominal distention, abdominal pain, anal bleeding, blood in stool, constipation, diarrhea,  "nausea, rectal pain and vomiting.   Endocrine: Negative for cold intolerance, heat intolerance, polydipsia, polyphagia and polyuria.   Genitourinary: Negative for decreased urine volume, difficulty urinating, dysuria, enuresis, flank pain, frequency, genital sores, hematuria and urgency.   Musculoskeletal: Negative for arthralgias, back pain, gait problem, joint swelling, myalgias, neck pain and neck stiffness.   Skin: Negative for color change, pallor, rash and wound.   Allergic/Immunologic: Negative for environmental allergies, food allergies and immunocompromised state.   Neurological: Negative for dizziness, tremors, seizures, syncope, facial asymmetry, speech difficulty, weakness, light-headedness, numbness and headaches.   Hematological: Negative for adenopathy. Does not bruise/bleed easily.   Psychiatric/Behavioral: Negative for agitation, behavioral problems, confusion, decreased concentration, dysphoric mood, hallucinations, self-injury, sleep disturbance and suicidal ideas. The patient is not nervous/anxious and is not hyperactive.      Social History     Socioeconomic History   • Marital status:      Spouse name: Not on file   • Number of children: Not on file   • Years of education: Not on file   • Highest education level: Not on file   Tobacco Use   • Smoking status: Never Smoker   • Smokeless tobacco: Never Used   Substance and Sexual Activity   • Alcohol use: No   • Drug use: No   • Sexual activity: Defer     Family History   Problem Relation Age of Onset   • Arthritis Other    • Hyperlipidemia Other    • Thyroid disease Other    • Heart attack Other    • Hypertension Other    • Lung cancer Other    • Lung cancer Other    • Hypertension Father    • Lung cancer Paternal Uncle    • Heart attack Maternal Grandmother    • Lung cancer Paternal Grandmother      /70   Pulse 72   Ht 165.1 cm (65\")   Wt 70.4 kg (155 lb 3.2 oz)   SpO2 98%   BMI 25.83 kg/m²   Physical Exam   Constitutional: She " is oriented to person, place, and time. She appears well-developed and well-nourished.   HENT:   Head: Normocephalic and atraumatic.   Nose: Nose normal.   Mouth/Throat: Oropharynx is clear and moist.   Eyes: Pupils are equal, round, and reactive to light. Conjunctivae, EOM and lids are normal.   Neck: Trachea normal and normal range of motion. Neck supple. Carotid bruit is not present. No tracheal deviation present. Thyromegaly (right thyroid fullness ) present. No thyroid mass present.   Cardiovascular: Normal rate, regular rhythm, normal heart sounds and intact distal pulses. Exam reveals no gallop and no friction rub.   No murmur heard.  Pulmonary/Chest: Effort normal and breath sounds normal. No respiratory distress. She has no wheezes.   Musculoskeletal: Normal range of motion. She exhibits no edema or deformity.   Lymphadenopathy:     She has no cervical adenopathy.   Neurological: She is alert and oriented to person, place, and time. She has normal reflexes. She displays normal reflexes. No cranial nerve deficit.   Skin: Skin is warm and dry. No rash noted. No cyanosis or erythema. Nails show no clubbing.   Psychiatric: She has a normal mood and affect. Her speech is normal and behavior is normal. Judgment and thought content normal. Cognition and memory are normal.   Nursing note and vitals reviewed.    Results for orders placed or performed in visit on 11/19/19   Comprehensive Metabolic Panel   Result Value Ref Range    Glucose 92 65 - 99 mg/dL    BUN 11 6 - 20 mg/dL    Creatinine 0.84 0.57 - 1.00 mg/dL    Sodium 141 136 - 145 mmol/L    Potassium 4.5 3.5 - 5.2 mmol/L    Chloride 105 98 - 107 mmol/L    CO2 23.6 22.0 - 29.0 mmol/L    Calcium 9.4 8.6 - 10.5 mg/dL    Total Protein 7.3 6.0 - 8.5 g/dL    Albumin 4.40 3.50 - 5.20 g/dL    ALT (SGPT) 23 1 - 33 U/L    AST (SGOT) 24 1 - 32 U/L    Alkaline Phosphatase 55 39 - 117 U/L    Total Bilirubin 0.2 0.2 - 1.2 mg/dL    eGFR Non African Amer 80 >60 mL/min/1.73     Globulin 2.9 gm/dL    A/G Ratio 1.5 g/dL    BUN/Creatinine Ratio 13.1 7.0 - 25.0    Anion Gap 12.4 5.0 - 15.0 mmol/L   CBC Auto Differential   Result Value Ref Range    WBC 4.48 3.40 - 10.80 10*3/mm3    RBC 4.17 3.77 - 5.28 10*6/mm3    Hemoglobin 12.0 12.0 - 15.9 g/dL    Hematocrit 36.0 34.0 - 46.6 %    MCV 86.3 79.0 - 97.0 fL    MCH 28.8 26.6 - 33.0 pg    MCHC 33.3 31.5 - 35.7 g/dL    RDW 12.3 12.3 - 15.4 %    RDW-SD 38.8 37.0 - 54.0 fl    MPV 12.6 (H) 6.0 - 12.0 fL    Platelets 224 140 - 450 10*3/mm3    Neutrophil % 43.5 42.7 - 76.0 %    Lymphocyte % 46.4 (H) 19.6 - 45.3 %    Monocyte % 7.6 5.0 - 12.0 %    Eosinophil % 1.6 0.3 - 6.2 %    Basophil % 0.7 0.0 - 1.5 %    Immature Grans % 0.2 0.0 - 0.5 %    Neutrophils, Absolute 1.95 1.70 - 7.00 10*3/mm3    Lymphocytes, Absolute 2.08 0.70 - 3.10 10*3/mm3    Monocytes, Absolute 0.34 0.10 - 0.90 10*3/mm3    Eosinophils, Absolute 0.07 0.00 - 0.40 10*3/mm3    Basophils, Absolute 0.03 0.00 - 0.20 10*3/mm3    Immature Grans, Absolute 0.01 0.00 - 0.05 10*3/mm3    nRBC 0.0 0.0 - 0.2 /100 WBC   T4, Free   Result Value Ref Range    Free T4 0.99 0.93 - 1.70 ng/dL   TSH   Result Value Ref Range    TSH 0.391 0.270 - 4.200 uIU/mL   Thyroid Stimulating Immunoglobulin   Result Value Ref Range    Thyroid Stimulating Immunoglobulin 1.78 (H) 0.00 - 0.55 IU/L     Problem List Items Addressed This Visit        Endocrine    Hyperthyroidism - Primary     Is on tapazole 5 mg - three days a week  Check cmp, cbc, tfts and tsi   F/u 6 months   Doing well overall  Work on weaning medication   Discussed discontinuing tapazole prior to conception          Relevant Orders    Comprehensive Metabolic Panel    CBC Auto Differential    T4, Free    TSH    Thyroid Stimulating Immunoglobulin    US Thyroid       Other    Abnormal imaging of thyroid     Calcium noted on neck films- check u/s   Right thyroid prominent              Return in about 6 months (around 1/10/2021).    Marni Pan MD

## 2020-07-11 LAB
ALBUMIN SERPL-MCNC: 4.4 G/DL (ref 3.5–5.2)
ALBUMIN/GLOB SERPL: 1.5 G/DL
ALP SERPL-CCNC: 48 U/L (ref 39–117)
ALT SERPL W P-5'-P-CCNC: 20 U/L (ref 1–33)
ANION GAP SERPL CALCULATED.3IONS-SCNC: 9.9 MMOL/L (ref 5–15)
AST SERPL-CCNC: 20 U/L (ref 1–32)
BASOPHILS # BLD AUTO: 0.04 10*3/MM3 (ref 0–0.2)
BASOPHILS NFR BLD AUTO: 0.8 % (ref 0–1.5)
BILIRUB SERPL-MCNC: 0.2 MG/DL (ref 0–1.2)
BUN SERPL-MCNC: 9 MG/DL (ref 6–20)
BUN/CREAT SERPL: 12.2 (ref 7–25)
CALCIUM SPEC-SCNC: 9.6 MG/DL (ref 8.6–10.5)
CHLORIDE SERPL-SCNC: 105 MMOL/L (ref 98–107)
CO2 SERPL-SCNC: 23.1 MMOL/L (ref 22–29)
CREAT SERPL-MCNC: 0.74 MG/DL (ref 0.57–1)
DEPRECATED RDW RBC AUTO: 40.3 FL (ref 37–54)
EOSINOPHIL # BLD AUTO: 0.18 10*3/MM3 (ref 0–0.4)
EOSINOPHIL NFR BLD AUTO: 3.6 % (ref 0.3–6.2)
ERYTHROCYTE [DISTWIDTH] IN BLOOD BY AUTOMATED COUNT: 13.1 % (ref 12.3–15.4)
GFR SERPL CREATININE-BSD FRML MDRD: 92 ML/MIN/1.73
GLOBULIN UR ELPH-MCNC: 3 GM/DL
GLUCOSE SERPL-MCNC: 79 MG/DL (ref 65–99)
HCT VFR BLD AUTO: 36 % (ref 34–46.6)
HGB BLD-MCNC: 11.6 G/DL (ref 12–15.9)
IMM GRANULOCYTES # BLD AUTO: 0 10*3/MM3 (ref 0–0.05)
IMM GRANULOCYTES NFR BLD AUTO: 0 % (ref 0–0.5)
LYMPHOCYTES # BLD AUTO: 2.05 10*3/MM3 (ref 0.7–3.1)
LYMPHOCYTES NFR BLD AUTO: 41.2 % (ref 19.6–45.3)
MCH RBC QN AUTO: 27.5 PG (ref 26.6–33)
MCHC RBC AUTO-ENTMCNC: 32.2 G/DL (ref 31.5–35.7)
MCV RBC AUTO: 85.3 FL (ref 79–97)
MONOCYTES # BLD AUTO: 0.38 10*3/MM3 (ref 0.1–0.9)
MONOCYTES NFR BLD AUTO: 7.6 % (ref 5–12)
NEUTROPHILS NFR BLD AUTO: 2.33 10*3/MM3 (ref 1.7–7)
NEUTROPHILS NFR BLD AUTO: 46.8 % (ref 42.7–76)
NRBC BLD AUTO-RTO: 0.2 /100 WBC (ref 0–0.2)
PLATELET # BLD AUTO: 224 10*3/MM3 (ref 140–450)
PMV BLD AUTO: 11.8 FL (ref 6–12)
POTASSIUM SERPL-SCNC: 4.4 MMOL/L (ref 3.5–5.2)
PROT SERPL-MCNC: 7.4 G/DL (ref 6–8.5)
RBC # BLD AUTO: 4.22 10*6/MM3 (ref 3.77–5.28)
SODIUM SERPL-SCNC: 138 MMOL/L (ref 136–145)
T4 FREE SERPL-MCNC: 1.16 NG/DL (ref 0.93–1.7)
TSH SERPL DL<=0.05 MIU/L-ACNC: 0.81 UIU/ML (ref 0.27–4.2)
WBC # BLD AUTO: 4.98 10*3/MM3 (ref 3.4–10.8)

## 2020-07-11 NOTE — ASSESSMENT & PLAN NOTE
Is on tapazole 5 mg - three days a week  Check cmp, cbc, tfts and tsi   F/u 6 months   Doing well overall  Work on weaning medication   Discussed discontinuing tapazole prior to conception

## 2020-07-13 LAB — TSI SER-MCNC: 0.88 IU/L (ref 0–0.55)

## 2020-09-21 ENCOUNTER — HOSPITAL ENCOUNTER (OUTPATIENT)
Age: 30
End: 2020-09-21
Payer: COMMERCIAL

## 2020-09-21 DIAGNOSIS — Z03.818: Primary | ICD-10-CM

## 2020-09-21 PROCEDURE — U0004 COV-19 TEST NON-CDC HGH THRU: HCPCS

## 2020-09-30 NOTE — TELEPHONE ENCOUNTER
Case of 44 y/o  Man with obesity amd HH diagnosed by imaging who came with complaint of solid food dysphagia x the last 5 weeks. Patient refer difficult swallowing meats mostly, no problem with liquids, associated symptoms include nausea with vomiting. Denies weight loss, GI bleeding or abdominal pain. Former smoker. t4 improved but 3.06- increase methimazole

## 2020-12-07 RX ORDER — METHIMAZOLE 5 MG/1
TABLET ORAL
Qty: 270 TABLET | Refills: 0 | Status: SHIPPED | OUTPATIENT
Start: 2020-12-07 | End: 2021-09-28

## 2021-01-05 ENCOUNTER — TELEPHONE (OUTPATIENT)
Dept: ENDOCRINOLOGY | Facility: CLINIC | Age: 31
End: 2021-01-05

## 2021-01-05 RX ORDER — ATENOLOL 50 MG/1
50 TABLET ORAL DAILY
Qty: 90 TABLET | Refills: 1 | Status: SHIPPED | OUTPATIENT
Start: 2021-01-05 | End: 2021-07-09

## 2021-01-14 ENCOUNTER — OFFICE VISIT (OUTPATIENT)
Dept: ENDOCRINOLOGY | Facility: CLINIC | Age: 31
End: 2021-01-14

## 2021-01-14 ENCOUNTER — LAB (OUTPATIENT)
Dept: LAB | Facility: HOSPITAL | Age: 31
End: 2021-01-14

## 2021-01-14 VITALS
WEIGHT: 157.6 LBS | BODY MASS INDEX: 26.26 KG/M2 | HEIGHT: 65 IN | OXYGEN SATURATION: 100 % | HEART RATE: 88 BPM | DIASTOLIC BLOOD PRESSURE: 64 MMHG | SYSTOLIC BLOOD PRESSURE: 118 MMHG

## 2021-01-14 DIAGNOSIS — E05.90 HYPERTHYROIDISM: Primary | ICD-10-CM

## 2021-01-14 DIAGNOSIS — R25.1 TREMOR: ICD-10-CM

## 2021-01-14 PROCEDURE — 80053 COMPREHEN METABOLIC PANEL: CPT | Performed by: INTERNAL MEDICINE

## 2021-01-14 PROCEDURE — 99213 OFFICE O/P EST LOW 20 MIN: CPT | Performed by: INTERNAL MEDICINE

## 2021-01-14 PROCEDURE — 84439 ASSAY OF FREE THYROXINE: CPT | Performed by: INTERNAL MEDICINE

## 2021-01-14 PROCEDURE — 84445 ASSAY OF TSI GLOBULIN: CPT | Performed by: INTERNAL MEDICINE

## 2021-01-14 PROCEDURE — 85025 COMPLETE CBC W/AUTO DIFF WBC: CPT | Performed by: INTERNAL MEDICINE

## 2021-01-14 PROCEDURE — 84443 ASSAY THYROID STIM HORMONE: CPT | Performed by: INTERNAL MEDICINE

## 2021-01-14 RX ORDER — FLUOXETINE 10 MG/1
1 CAPSULE ORAL DAILY
COMMUNITY
Start: 2020-12-15 | End: 2021-12-01

## 2021-01-14 NOTE — PROGRESS NOTES
Becky Naqvi 30 y.o.  CC:  Chief Complaint   Patient presents with   • Follow-up   • Hyperthyroidism       [place]: Follow-up and Hyperthyroidism    Started feeling symptomatic again  Tachycardia, anxiety   Is on prozac now (stated 1 month ago)  Is on tapazole 5 mg - 2 pills daily (has been taking for 3 weeks)  Resumed atenolol     Allergies   Allergen Reactions   • Oxycodone-Acetaminophen    • Propylthiouracil      Neutropenia       Current Outpatient Medications:   •  atenolol (TENORMIN) 50 MG tablet, Take 1 tablet by mouth Daily., Disp: 90 tablet, Rfl: 1  •  FLUoxetine (PROzac) 10 MG capsule, Take 1 capsule by mouth Daily., Disp: , Rfl:   •  methIMAzole (TAPAZOLE) 5 MG tablet, TAKE 2 TABLETS IN THE MORNING AND 1 TABLET IN THE EVENING, Disp: 270 tablet, Rfl: 0  Patient Active Problem List    Diagnosis   • Abnormal imaging of thyroid [R93.89]   • Delivered by  section [Z38.01]   • Anemia [D64.9]   • Ganglion of hand [M67.449]   • Gastroesophageal reflux disease [K21.9]   • 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   HENT: Negative for congestion and rhinorrhea.    Eyes: Negative for visual disturbance.   Respiratory: Negative for cough and shortness of breath.    Cardiovascular: Positive for palpitations. Negative for leg swelling.   Gastrointestinal: Negative for constipation, diarrhea and nausea.   Endocrine: Positive for heat intolerance.   Genitourinary: Negative for hematuria.   Musculoskeletal: Negative for arthralgias, back pain, gait problem, joint swelling and myalgias.   Skin: Negative for color change, rash and wound.   Allergic/Immunologic: Negative for environmental allergies, food allergies and immunocompromised state.  "  Neurological: Negative for dizziness, weakness and light-headedness.   Psychiatric/Behavioral: Negative for confusion, decreased concentration, dysphoric mood and sleep disturbance. The patient is nervous/anxious.      Social History     Socioeconomic History   • Marital status:      Spouse name: Not on file   • Number of children: Not on file   • Years of education: Not on file   • Highest education level: Not on file   Tobacco Use   • Smoking status: Never Smoker   • Smokeless tobacco: Never Used   Substance and Sexual Activity   • Alcohol use: No   • Drug use: No   • Sexual activity: Defer     Family History   Problem Relation Age of Onset   • Arthritis Other    • Hyperlipidemia Other    • Thyroid disease Other    • Heart attack Other    • Hypertension Other    • Lung cancer Other    • Lung cancer Other    • Hypertension Father    • Lung cancer Paternal Uncle    • Heart attack Maternal Grandmother    • Lung cancer Paternal Grandmother      /64   Pulse 88   Ht 165.1 cm (65\")   Wt 71.5 kg (157 lb 9.6 oz)   SpO2 100%   BMI 26.23 kg/m²   Physical Exam  Vitals signs and nursing note reviewed.   Constitutional:       Appearance: Normal appearance. She is well-developed.   HENT:      Head: Normocephalic and atraumatic.   Eyes:      General: Lids are normal.      Extraocular Movements: Extraocular movements intact.      Conjunctiva/sclera: Conjunctivae normal.      Pupils: Pupils are equal, round, and reactive to light.   Neck:      Musculoskeletal: Normal range of motion and neck supple.      Thyroid: No thyroid mass or thyromegaly.      Vascular: No carotid bruit.      Trachea: Trachea normal. No tracheal deviation.   Cardiovascular:      Rate and Rhythm: Normal rate and regular rhythm.      Pulses: Normal pulses.      Heart sounds: Normal heart sounds. No murmur. No friction rub. No gallop.    Pulmonary:      Effort: Pulmonary effort is normal. No respiratory distress.      Breath sounds: Normal " breath sounds. No wheezing.   Musculoskeletal: Normal range of motion.         General: No deformity.   Lymphadenopathy:      Cervical: No cervical adenopathy.   Skin:     General: Skin is warm and dry.      Findings: No erythema or rash.      Nails: There is no clubbing.     Neurological:      Mental Status: She is alert and oriented to person, place, and time.      Cranial Nerves: No cranial nerve deficit.      Deep Tendon Reflexes: Reflexes are normal and symmetric. Reflexes normal.      Comments: Tremor    Psychiatric:         Speech: Speech normal.         Behavior: Behavior normal.         Thought Content: Thought content normal.         Judgment: Judgment normal.       Results for orders placed or performed in visit on 07/10/20   Comprehensive Metabolic Panel    Specimen: Blood   Result Value Ref Range    Glucose 79 65 - 99 mg/dL    BUN 9 6 - 20 mg/dL    Creatinine 0.74 0.57 - 1.00 mg/dL    Sodium 138 136 - 145 mmol/L    Potassium 4.4 3.5 - 5.2 mmol/L    Chloride 105 98 - 107 mmol/L    CO2 23.1 22.0 - 29.0 mmol/L    Calcium 9.6 8.6 - 10.5 mg/dL    Total Protein 7.4 6.0 - 8.5 g/dL    Albumin 4.40 3.50 - 5.20 g/dL    ALT (SGPT) 20 1 - 33 U/L    AST (SGOT) 20 1 - 32 U/L    Alkaline Phosphatase 48 39 - 117 U/L    Total Bilirubin 0.2 0.0 - 1.2 mg/dL    eGFR Non African Amer 92 >60 mL/min/1.73    Globulin 3.0 gm/dL    A/G Ratio 1.5 g/dL    BUN/Creatinine Ratio 12.2 7.0 - 25.0    Anion Gap 9.9 5.0 - 15.0 mmol/L   CBC Auto Differential    Specimen: Blood   Result Value Ref Range    WBC 4.98 3.40 - 10.80 10*3/mm3    RBC 4.22 3.77 - 5.28 10*6/mm3    Hemoglobin 11.6 (L) 12.0 - 15.9 g/dL    Hematocrit 36.0 34.0 - 46.6 %    MCV 85.3 79.0 - 97.0 fL    MCH 27.5 26.6 - 33.0 pg    MCHC 32.2 31.5 - 35.7 g/dL    RDW 13.1 12.3 - 15.4 %    RDW-SD 40.3 37.0 - 54.0 fl    MPV 11.8 6.0 - 12.0 fL    Platelets 224 140 - 450 10*3/mm3    Neutrophil % 46.8 42.7 - 76.0 %    Lymphocyte % 41.2 19.6 - 45.3 %    Monocyte % 7.6 5.0 - 12.0 %     Eosinophil % 3.6 0.3 - 6.2 %    Basophil % 0.8 0.0 - 1.5 %    Immature Grans % 0.0 0.0 - 0.5 %    Neutrophils, Absolute 2.33 1.70 - 7.00 10*3/mm3    Lymphocytes, Absolute 2.05 0.70 - 3.10 10*3/mm3    Monocytes, Absolute 0.38 0.10 - 0.90 10*3/mm3    Eosinophils, Absolute 0.18 0.00 - 0.40 10*3/mm3    Basophils, Absolute 0.04 0.00 - 0.20 10*3/mm3    Immature Grans, Absolute 0.00 0.00 - 0.05 10*3/mm3    nRBC 0.2 0.0 - 0.2 /100 WBC   T4, Free    Specimen: Blood   Result Value Ref Range    Free T4 1.16 0.93 - 1.70 ng/dL   TSH    Specimen: Blood   Result Value Ref Range    TSH 0.810 0.270 - 4.200 uIU/mL   Thyroid Stimulating Immunoglobulin    Specimen: Blood   Result Value Ref Range    Thyroid Stimulating Immunoglobulin 0.88 (H) 0.00 - 0.55 IU/L     Problems Addressed this Visit        Other    Tremor     See above   Some heat intolerance as well          Hyperthyroidism - Primary     Update tfts, cbc, cmp   Check tsi   On 10 mg daily methimazole and atenolol   Still sig tremor          Relevant Orders    CBC Auto Differential    Comprehensive Metabolic Panel    TSH    T4, Free    Thyroid Stimulating Immunoglobulin      Diagnoses       Codes Comments    Hyperthyroidism    -  Primary ICD-10-CM: E05.90  ICD-9-CM: 242.90     Tremor     ICD-10-CM: R25.1  ICD-9-CM: 781.0         Return in about 6 months (around 7/14/2021).    Laura Wadsworth MA  Signed Marni Pan MD

## 2021-01-15 LAB
ALBUMIN SERPL-MCNC: 4.4 G/DL (ref 3.5–5.2)
ALBUMIN/GLOB SERPL: 1.8 G/DL
ALP SERPL-CCNC: 56 U/L (ref 39–117)
ALT SERPL W P-5'-P-CCNC: 38 U/L (ref 1–33)
ANION GAP SERPL CALCULATED.3IONS-SCNC: 8.3 MMOL/L (ref 5–15)
AST SERPL-CCNC: 20 U/L (ref 1–32)
BASOPHILS # BLD AUTO: 0.02 10*3/MM3 (ref 0–0.2)
BASOPHILS NFR BLD AUTO: 0.5 % (ref 0–1.5)
BILIRUB SERPL-MCNC: 0.2 MG/DL (ref 0–1.2)
BUN SERPL-MCNC: 17 MG/DL (ref 6–20)
BUN/CREAT SERPL: 32.1 (ref 7–25)
CALCIUM SPEC-SCNC: 9.5 MG/DL (ref 8.6–10.5)
CHLORIDE SERPL-SCNC: 105 MMOL/L (ref 98–107)
CO2 SERPL-SCNC: 24.7 MMOL/L (ref 22–29)
CREAT SERPL-MCNC: 0.53 MG/DL (ref 0.57–1)
DEPRECATED RDW RBC AUTO: 37 FL (ref 37–54)
EOSINOPHIL # BLD AUTO: 0.05 10*3/MM3 (ref 0–0.4)
EOSINOPHIL NFR BLD AUTO: 1.3 % (ref 0.3–6.2)
ERYTHROCYTE [DISTWIDTH] IN BLOOD BY AUTOMATED COUNT: 11.6 % (ref 12.3–15.4)
GFR SERPL CREATININE-BSD FRML MDRD: 135 ML/MIN/1.73
GLOBULIN UR ELPH-MCNC: 2.5 GM/DL
GLUCOSE SERPL-MCNC: 94 MG/DL (ref 65–99)
HCT VFR BLD AUTO: 38.6 % (ref 34–46.6)
HGB BLD-MCNC: 12.8 G/DL (ref 12–15.9)
IMM GRANULOCYTES # BLD AUTO: 0.01 10*3/MM3 (ref 0–0.05)
IMM GRANULOCYTES NFR BLD AUTO: 0.3 % (ref 0–0.5)
LYMPHOCYTES # BLD AUTO: 1.7 10*3/MM3 (ref 0.7–3.1)
LYMPHOCYTES NFR BLD AUTO: 42.9 % (ref 19.6–45.3)
MCH RBC QN AUTO: 28.9 PG (ref 26.6–33)
MCHC RBC AUTO-ENTMCNC: 33.2 G/DL (ref 31.5–35.7)
MCV RBC AUTO: 87.1 FL (ref 79–97)
MONOCYTES # BLD AUTO: 0.55 10*3/MM3 (ref 0.1–0.9)
MONOCYTES NFR BLD AUTO: 13.9 % (ref 5–12)
NEUTROPHILS NFR BLD AUTO: 1.63 10*3/MM3 (ref 1.7–7)
NEUTROPHILS NFR BLD AUTO: 41.1 % (ref 42.7–76)
NRBC BLD AUTO-RTO: 0.3 /100 WBC (ref 0–0.2)
PLATELET # BLD AUTO: 213 10*3/MM3 (ref 140–450)
PMV BLD AUTO: 13 FL (ref 6–12)
POTASSIUM SERPL-SCNC: 4.2 MMOL/L (ref 3.5–5.2)
PROT SERPL-MCNC: 6.9 G/DL (ref 6–8.5)
RBC # BLD AUTO: 4.43 10*6/MM3 (ref 3.77–5.28)
SODIUM SERPL-SCNC: 138 MMOL/L (ref 136–145)
T4 FREE SERPL-MCNC: 4.2 NG/DL (ref 0.93–1.7)
TSH SERPL DL<=0.05 MIU/L-ACNC: <0.005 UIU/ML (ref 0.27–4.2)
WBC # BLD AUTO: 3.96 10*3/MM3 (ref 3.4–10.8)

## 2021-01-16 ENCOUNTER — TELEPHONE (OUTPATIENT)
Dept: ENDOCRINOLOGY | Facility: CLINIC | Age: 31
End: 2021-01-16

## 2021-01-16 DIAGNOSIS — E05.90 HYPERTHYROIDISM: Primary | ICD-10-CM

## 2021-01-18 LAB — TSI SER-ACNC: 89.2 IU/L (ref 0–0.55)

## 2021-05-21 ENCOUNTER — TELEPHONE (OUTPATIENT)
Dept: INTERNAL MEDICINE | Facility: CLINIC | Age: 31
End: 2021-05-21

## 2021-05-21 DIAGNOSIS — E05.90 HYPERTHYROIDISM: Primary | ICD-10-CM

## 2021-05-25 ENCOUNTER — OFFICE VISIT (OUTPATIENT)
Dept: OBSTETRICS AND GYNECOLOGY | Facility: CLINIC | Age: 31
End: 2021-05-25

## 2021-05-25 ENCOUNTER — LAB (OUTPATIENT)
Dept: LAB | Facility: HOSPITAL | Age: 31
End: 2021-05-25

## 2021-05-25 ENCOUNTER — LAB (OUTPATIENT)
Dept: ENDOCRINOLOGY | Facility: CLINIC | Age: 31
End: 2021-05-25

## 2021-05-25 VITALS
HEIGHT: 65 IN | BODY MASS INDEX: 25.83 KG/M2 | WEIGHT: 155 LBS | SYSTOLIC BLOOD PRESSURE: 122 MMHG | DIASTOLIC BLOOD PRESSURE: 72 MMHG

## 2021-05-25 DIAGNOSIS — Z71.85 HPV VACCINE COUNSELING: ICD-10-CM

## 2021-05-25 DIAGNOSIS — Z01.419 WOMEN'S ANNUAL ROUTINE GYNECOLOGICAL EXAMINATION: Primary | ICD-10-CM

## 2021-05-25 DIAGNOSIS — Z12.39 ENCOUNTER FOR BREAST CANCER SCREENING USING NON-MAMMOGRAM MODALITY: ICD-10-CM

## 2021-05-25 LAB
ALBUMIN SERPL-MCNC: 4.2 G/DL (ref 3.5–5.2)
ALBUMIN/GLOB SERPL: 1.6 G/DL
ALP SERPL-CCNC: 59 U/L (ref 39–117)
ALT SERPL W P-5'-P-CCNC: 27 U/L (ref 1–33)
ANION GAP SERPL CALCULATED.3IONS-SCNC: 8.8 MMOL/L (ref 5–15)
AST SERPL-CCNC: 23 U/L (ref 1–32)
BASOPHILS # BLD AUTO: 0.01 10*3/MM3 (ref 0–0.2)
BASOPHILS NFR BLD AUTO: 0.3 % (ref 0–1.5)
BILIRUB SERPL-MCNC: 0.3 MG/DL (ref 0–1.2)
BUN SERPL-MCNC: 17 MG/DL (ref 6–20)
BUN/CREAT SERPL: 29.8 (ref 7–25)
CALCIUM SPEC-SCNC: 9.8 MG/DL (ref 8.6–10.5)
CHLORIDE SERPL-SCNC: 106 MMOL/L (ref 98–107)
CO2 SERPL-SCNC: 23.2 MMOL/L (ref 22–29)
CREAT SERPL-MCNC: 0.57 MG/DL (ref 0.57–1)
DEPRECATED RDW RBC AUTO: 37.8 FL (ref 37–54)
EOSINOPHIL # BLD AUTO: 0.03 10*3/MM3 (ref 0–0.4)
EOSINOPHIL NFR BLD AUTO: 0.8 % (ref 0.3–6.2)
ERYTHROCYTE [DISTWIDTH] IN BLOOD BY AUTOMATED COUNT: 12.6 % (ref 12.3–15.4)
GFR SERPL CREATININE-BSD FRML MDRD: 123 ML/MIN/1.73
GLOBULIN UR ELPH-MCNC: 2.6 GM/DL
GLUCOSE SERPL-MCNC: 92 MG/DL (ref 65–99)
HCT VFR BLD AUTO: 36.9 % (ref 34–46.6)
HGB BLD-MCNC: 12.3 G/DL (ref 12–15.9)
IMM GRANULOCYTES # BLD AUTO: 0.01 10*3/MM3 (ref 0–0.05)
IMM GRANULOCYTES NFR BLD AUTO: 0.3 % (ref 0–0.5)
LYMPHOCYTES # BLD AUTO: 1.49 10*3/MM3 (ref 0.7–3.1)
LYMPHOCYTES NFR BLD AUTO: 38.7 % (ref 19.6–45.3)
MCH RBC QN AUTO: 27.8 PG (ref 26.6–33)
MCHC RBC AUTO-ENTMCNC: 33.3 G/DL (ref 31.5–35.7)
MCV RBC AUTO: 83.5 FL (ref 79–97)
MONOCYTES # BLD AUTO: 0.46 10*3/MM3 (ref 0.1–0.9)
MONOCYTES NFR BLD AUTO: 11.9 % (ref 5–12)
NEUTROPHILS NFR BLD AUTO: 1.85 10*3/MM3 (ref 1.7–7)
NEUTROPHILS NFR BLD AUTO: 48 % (ref 42.7–76)
NRBC BLD AUTO-RTO: 0 /100 WBC (ref 0–0.2)
PLATELET # BLD AUTO: 196 10*3/MM3 (ref 140–450)
PMV BLD AUTO: 11.7 FL (ref 6–12)
POTASSIUM SERPL-SCNC: 4.5 MMOL/L (ref 3.5–5.2)
PROT SERPL-MCNC: 6.8 G/DL (ref 6–8.5)
RBC # BLD AUTO: 4.42 10*6/MM3 (ref 3.77–5.28)
SODIUM SERPL-SCNC: 138 MMOL/L (ref 136–145)
T4 FREE SERPL-MCNC: 3.01 NG/DL (ref 0.93–1.7)
TSH SERPL DL<=0.05 MIU/L-ACNC: <0.005 UIU/ML (ref 0.27–4.2)
WBC # BLD AUTO: 3.85 10*3/MM3 (ref 3.4–10.8)

## 2021-05-25 PROCEDURE — 84443 ASSAY THYROID STIM HORMONE: CPT | Performed by: INTERNAL MEDICINE

## 2021-05-25 PROCEDURE — 99395 PREV VISIT EST AGE 18-39: CPT | Performed by: OBSTETRICS & GYNECOLOGY

## 2021-05-25 PROCEDURE — 80053 COMPREHEN METABOLIC PANEL: CPT | Performed by: INTERNAL MEDICINE

## 2021-05-25 PROCEDURE — 85025 COMPLETE CBC W/AUTO DIFF WBC: CPT | Performed by: INTERNAL MEDICINE

## 2021-05-25 PROCEDURE — 84439 ASSAY OF FREE THYROXINE: CPT | Performed by: INTERNAL MEDICINE

## 2021-05-25 NOTE — PROGRESS NOTES
GYN Annual Exam     CC - Here for annual exam.     Subjective   HPI  Becky Naqvi is a 31 y.o. female, , who presents for annual well woman exam. Patient's last menstrual period was 2021..  Periods are regular every 25-35 days, lasting 5 days. The patient uses 1 of tampons/pads per hour., lasting 1 days.  Dysmenorrhea:mild, occurring first 1-2 days of flow.  Patient reports problems with: none.  Partner Status: Marital Status: .  New Partners since last visit: no.  Desires STD Screening: no.  Patient has not had the HPV vaccine. Pt declines Gardasil.    Additional OB/GYN History     Current contraception: contraceptive methods: Condoms  Desires to: continue contraception  Last Pap :   Last Completed Pap Smear          PAP SMEAR (Every 3 Years) Next due on 2020  Done - negative; last HPV regardless 2019              History of abnormal Pap smear: no  Family history of uterine, colon, breast, or ovarian cancer: no  Previous Mammogram :  no  Performs monthly Self-Breast Exam: yes  Exercises Regularly:yes  Feelings of Anxiety or Depression: no  Tobacco Usage?: No   OB History        2    Para   2    Term   2            AB        Living   2       SAB        TAB        Ectopic        Molar        Multiple   0    Live Births   2                Health Maintenance   Topic Date Due   • Annual Gynecologic Pelvic and Breast Exam  Never done   • ANNUAL PHYSICAL  Never done   • TDAP/TD VACCINES (1 - Tdap) Never done   • HEPATITIS C SCREENING  Never done   • COVID-19 Vaccine (2 - Moderna 2-dose series) 2021   • INFLUENZA VACCINE  2021   • PAP SMEAR  2023   • Pneumococcal Vaccine 0-64  Aged Out           The additional following portions of the patient's history were reviewed and updated as appropriate: allergies, current medications, past family history, past medical history, past social history, past surgical history and problem list.    Review of  "Systems   Constitutional: Negative.    HENT: Negative.    Eyes: Negative.    Respiratory: Negative.    Cardiovascular: Negative.    Gastrointestinal: Negative.    Endocrine: Negative.    Genitourinary: Negative.    Musculoskeletal: Negative.    Skin: Negative.    Allergic/Immunologic: Negative.    Neurological: Negative.    Hematological: Negative.    Psychiatric/Behavioral: Negative.        I have reviewed and agree with the HPI, ROS, and historical information as entered above. Emma Carney MD    Objective   /72   Ht 165.1 cm (65\")   Wt 70.3 kg (155 lb)   LMP 05/01/2021   BMI 25.79 kg/m²     Physical Exam  Vitals and nursing note reviewed. Exam conducted with a chaperone present.   Constitutional:       Appearance: She is well-developed.   HENT:      Head: Normocephalic and atraumatic.   Neck:      Thyroid: No thyroid mass or thyromegaly.   Cardiovascular:      Rate and Rhythm: Normal rate and regular rhythm.      Heart sounds: No murmur heard.     Pulmonary:      Effort: Pulmonary effort is normal. No retractions.      Breath sounds: Normal breath sounds. No wheezing, rhonchi or rales.   Chest:      Chest wall: No mass or tenderness.      Breasts:         Right: Normal. No mass, nipple discharge, skin change or tenderness.         Left: Normal. No mass, nipple discharge, skin change or tenderness.   Abdominal:      General: Bowel sounds are normal.      Palpations: Abdomen is soft. Abdomen is not rigid. There is no mass.      Tenderness: There is no abdominal tenderness. There is no guarding.      Hernia: No hernia is present. There is no hernia in the left inguinal area.   Genitourinary:     Labia:         Right: No rash, tenderness or lesion.         Left: No rash, tenderness or lesion.       Vagina: Normal. No vaginal discharge or lesions.      Cervix: No cervical motion tenderness, discharge, lesion or cervical bleeding.      Uterus: Normal. Not enlarged, not fixed and not tender.       Adnexa:   "       Right: No mass or tenderness.          Left: No mass or tenderness.        Rectum: No external hemorrhoid.   Musculoskeletal:      Cervical back: Normal range of motion. No muscular tenderness.   Neurological:      Mental Status: She is alert and oriented to person, place, and time.   Psychiatric:         Behavior: Behavior normal.         Assessment/Plan       Encounter Diagnoses   Name Primary?   • Women's annual routine gynecological examination Yes   • HPV vaccine counseling    • Encounter for breast cancer screening using non-mammogram modality        Plan     1. Recommended use of Vitamin D replacement and getting adequate calcium in her diet. (1500mg)  2. Reviewed monthly self breast exams.  Instructed to call with lumps, pain, or breast discharge.    3. Reviewed HPV guidelines and encouraged consideration of HPV vaccine  4. Reviewed exercise as a preventative health measures.    5. Thyromegaly/Hyperthyroidism- followed by Dr. Viviane Carney MD  05/25/2021

## 2021-07-01 ENCOUNTER — OFFICE VISIT (OUTPATIENT)
Dept: ENDOCRINOLOGY | Facility: CLINIC | Age: 31
End: 2021-07-01

## 2021-07-01 ENCOUNTER — LAB (OUTPATIENT)
Dept: LAB | Facility: HOSPITAL | Age: 31
End: 2021-07-01

## 2021-07-01 DIAGNOSIS — E05.90 HYPERTHYROIDISM: Primary | ICD-10-CM

## 2021-07-01 LAB
ALBUMIN SERPL-MCNC: 4.4 G/DL (ref 3.5–5.2)
ALBUMIN/GLOB SERPL: 2 G/DL
ALP SERPL-CCNC: 71 U/L (ref 39–117)
ALT SERPL W P-5'-P-CCNC: 32 U/L (ref 1–33)
ANION GAP SERPL CALCULATED.3IONS-SCNC: 8.7 MMOL/L (ref 5–15)
AST SERPL-CCNC: 19 U/L (ref 1–32)
BASOPHILS # BLD AUTO: 0.01 10*3/MM3 (ref 0–0.2)
BASOPHILS NFR BLD AUTO: 0.2 % (ref 0–1.5)
BILIRUB SERPL-MCNC: 0.2 MG/DL (ref 0–1.2)
BUN SERPL-MCNC: 16 MG/DL (ref 6–20)
BUN/CREAT SERPL: 22.5 (ref 7–25)
CALCIUM SPEC-SCNC: 9.2 MG/DL (ref 8.6–10.5)
CHLORIDE SERPL-SCNC: 105 MMOL/L (ref 98–107)
CO2 SERPL-SCNC: 24.3 MMOL/L (ref 22–29)
CREAT SERPL-MCNC: 0.71 MG/DL (ref 0.57–1)
DEPRECATED RDW RBC AUTO: 38.6 FL (ref 37–54)
EOSINOPHIL # BLD AUTO: 0.07 10*3/MM3 (ref 0–0.4)
EOSINOPHIL NFR BLD AUTO: 1.6 % (ref 0.3–6.2)
ERYTHROCYTE [DISTWIDTH] IN BLOOD BY AUTOMATED COUNT: 12.5 % (ref 12.3–15.4)
GFR SERPL CREATININE-BSD FRML MDRD: 95 ML/MIN/1.73
GLOBULIN UR ELPH-MCNC: 2.2 GM/DL
GLUCOSE SERPL-MCNC: 101 MG/DL (ref 65–99)
HCT VFR BLD AUTO: 39.5 % (ref 34–46.6)
HGB BLD-MCNC: 13 G/DL (ref 12–15.9)
IMM GRANULOCYTES # BLD AUTO: 0.01 10*3/MM3 (ref 0–0.05)
IMM GRANULOCYTES NFR BLD AUTO: 0.2 % (ref 0–0.5)
LYMPHOCYTES # BLD AUTO: 1.6 10*3/MM3 (ref 0.7–3.1)
LYMPHOCYTES NFR BLD AUTO: 37.2 % (ref 19.6–45.3)
MCH RBC QN AUTO: 28.3 PG (ref 26.6–33)
MCHC RBC AUTO-ENTMCNC: 32.9 G/DL (ref 31.5–35.7)
MCV RBC AUTO: 86.1 FL (ref 79–97)
MONOCYTES # BLD AUTO: 0.51 10*3/MM3 (ref 0.1–0.9)
MONOCYTES NFR BLD AUTO: 11.9 % (ref 5–12)
NEUTROPHILS NFR BLD AUTO: 2.1 10*3/MM3 (ref 1.7–7)
NEUTROPHILS NFR BLD AUTO: 48.9 % (ref 42.7–76)
NRBC BLD AUTO-RTO: 0 /100 WBC (ref 0–0.2)
PLATELET # BLD AUTO: 198 10*3/MM3 (ref 140–450)
PMV BLD AUTO: 11.7 FL (ref 6–12)
POTASSIUM SERPL-SCNC: 4.8 MMOL/L (ref 3.5–5.2)
PROT SERPL-MCNC: 6.6 G/DL (ref 6–8.5)
RBC # BLD AUTO: 4.59 10*6/MM3 (ref 3.77–5.28)
SODIUM SERPL-SCNC: 138 MMOL/L (ref 136–145)
T4 FREE SERPL-MCNC: 3 NG/DL (ref 0.93–1.7)
TSH SERPL DL<=0.05 MIU/L-ACNC: <0.005 UIU/ML (ref 0.27–4.2)
WBC # BLD AUTO: 4.3 10*3/MM3 (ref 3.4–10.8)

## 2021-07-01 PROCEDURE — 85025 COMPLETE CBC W/AUTO DIFF WBC: CPT | Performed by: INTERNAL MEDICINE

## 2021-07-01 PROCEDURE — 84439 ASSAY OF FREE THYROXINE: CPT | Performed by: INTERNAL MEDICINE

## 2021-07-01 PROCEDURE — 80053 COMPREHEN METABOLIC PANEL: CPT | Performed by: INTERNAL MEDICINE

## 2021-07-01 PROCEDURE — 84443 ASSAY THYROID STIM HORMONE: CPT | Performed by: INTERNAL MEDICINE

## 2021-07-01 PROCEDURE — 99213 OFFICE O/P EST LOW 20 MIN: CPT | Performed by: INTERNAL MEDICINE

## 2021-07-01 PROCEDURE — 84445 ASSAY OF TSI GLOBULIN: CPT | Performed by: INTERNAL MEDICINE

## 2021-07-01 NOTE — ASSESSMENT & PLAN NOTE
Is taking 10 mg tapazole all the time, sometimes taking 5 mg in the afternoon  Check cmp, cbc and tfts, tsi  Pros and cons of all options of therapy discussed  She is still considering future fertility  F/u 4-6 months

## 2021-07-01 NOTE — PROGRESS NOTES
Becky Naqvi 31 y.o.  CC: Follow-up and Hyperthyroidism (due to graves disease )      [place]: Follow-up and Hyperthyroidism (due to graves disease )    Is on methimazole 15 mg daily   TSI high last check 89  High T4 and low tsh noted   Options for therapy discussed   Pros and cons to [id number], surgery and continued methimazole discussed    Allergies   Allergen Reactions   • Oxycodone-Acetaminophen    • Propylthiouracil      Neutropenia       Current Outpatient Medications:   •  atenolol (TENORMIN) 50 MG tablet, Take 1 tablet by mouth Daily., Disp: 90 tablet, Rfl: 1  •  FLUoxetine (PROzac) 10 MG capsule, Take 1 capsule by mouth Daily., Disp: , Rfl:   •  methIMAzole (TAPAZOLE) 5 MG tablet, TAKE 2 TABLETS IN THE MORNING AND 1 TABLET IN THE EVENING, Disp: 270 tablet, Rfl: 0  Patient Active Problem List    Diagnosis    • Tremor [R25.1]    • Abnormal imaging of thyroid [R93.89]    • Delivered by  section [Z38.01]    • Anemia [D64.9]    • Ganglion of hand [M67.449]    • Gastroesophageal reflux disease [K21.9]    • 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   HENT: Negative for congestion and rhinorrhea.    Eyes: Negative for visual disturbance.   Respiratory: Negative for cough and shortness of breath.    Cardiovascular: Negative for palpitations and leg swelling.   Gastrointestinal: Negative for constipation, diarrhea and nausea.   Genitourinary: Negative for hematuria.   Musculoskeletal: Negative for arthralgias, back pain, gait problem, joint swelling and myalgias.   Skin: Negative for color change, rash and wound.   Allergic/Immunologic: Negative for environmental allergies, food allergies and immunocompromised state.   Neurological:  Negative for dizziness, weakness and light-headedness.   Psychiatric/Behavioral: Negative for confusion, decreased concentration, dysphoric mood and sleep disturbance. The patient is not nervous/anxious.      Social History     Socioeconomic History   • Marital status:      Spouse name: Not on file   • Number of children: Not on file   • Years of education: Not on file   • Highest education level: Not on file   Tobacco Use   • Smoking status: Never Smoker   • Smokeless tobacco: Never Used   Substance and Sexual Activity   • Alcohol use: No   • Drug use: No   • Sexual activity: Yes     Partners: Male     Birth control/protection: Condom     Family History   Problem Relation Age of Onset   • Hypertension Father    • Arthritis Father    • Hyperlipidemia Father    • Lung cancer Paternal Uncle    • Heart attack Maternal Grandmother    • Arthritis Maternal Grandmother    • Diabetes Maternal Grandmother    • Lung cancer Paternal Grandmother    • Arthritis Paternal Grandmother    • Heart disease Paternal Grandmother    • COPD Paternal Grandmother    • Graves' disease Mother    • Arthritis Maternal Grandfather    • Arthritis Paternal Grandfather      There were no vitals taken for this visit.  Physical Exam  Vitals and nursing note reviewed.   Constitutional:       Appearance: Normal appearance. She is well-developed.   HENT:      Head: Normocephalic and atraumatic.   Eyes:      General: Lids are normal.      Extraocular Movements: Extraocular movements intact.      Conjunctiva/sclera: Conjunctivae normal.      Pupils: Pupils are equal, round, and reactive to light.      Comments: Mild right proptosis    Neck:      Thyroid: Thyromegaly present. No thyroid mass.      Vascular: No carotid bruit.      Trachea: Trachea normal. No tracheal deviation.   Cardiovascular:      Rate and Rhythm: Normal rate and regular rhythm.      Heart sounds: Normal heart sounds. No murmur heard.   No friction rub. No gallop.    Pulmonary:       Effort: Pulmonary effort is normal. No respiratory distress.      Breath sounds: Normal breath sounds. No wheezing.   Musculoskeletal:         General: No deformity. Normal range of motion.      Cervical back: Normal range of motion and neck supple.   Lymphadenopathy:      Cervical: No cervical adenopathy.   Skin:     General: Skin is warm and dry.      Findings: No erythema or rash.      Nails: There is no clubbing.   Neurological:      Mental Status: She is alert and oriented to person, place, and time.      Cranial Nerves: No cranial nerve deficit.      Deep Tendon Reflexes: Reflexes are normal and symmetric. Reflexes normal.   Psychiatric:         Speech: Speech normal.         Behavior: Behavior normal.         Thought Content: Thought content normal.         Judgment: Judgment normal.       Results for orders placed or performed in visit on 05/21/21   Comprehensive Metabolic Panel    Specimen: Blood   Result Value Ref Range    Glucose 92 65 - 99 mg/dL    BUN 17 6 - 20 mg/dL    Creatinine 0.57 0.57 - 1.00 mg/dL    Sodium 138 136 - 145 mmol/L    Potassium 4.5 3.5 - 5.2 mmol/L    Chloride 106 98 - 107 mmol/L    CO2 23.2 22.0 - 29.0 mmol/L    Calcium 9.8 8.6 - 10.5 mg/dL    Total Protein 6.8 6.0 - 8.5 g/dL    Albumin 4.20 3.50 - 5.20 g/dL    ALT (SGPT) 27 1 - 33 U/L    AST (SGOT) 23 1 - 32 U/L    Alkaline Phosphatase 59 39 - 117 U/L    Total Bilirubin 0.3 0.0 - 1.2 mg/dL    eGFR Non African Amer 123 >60 mL/min/1.73    Globulin 2.6 gm/dL    A/G Ratio 1.6 g/dL    BUN/Creatinine Ratio 29.8 (H) 7.0 - 25.0    Anion Gap 8.8 5.0 - 15.0 mmol/L   CBC Auto Differential    Specimen: Blood   Result Value Ref Range    WBC 3.85 3.40 - 10.80 10*3/mm3    RBC 4.42 3.77 - 5.28 10*6/mm3    Hemoglobin 12.3 12.0 - 15.9 g/dL    Hematocrit 36.9 34.0 - 46.6 %    MCV 83.5 79.0 - 97.0 fL    MCH 27.8 26.6 - 33.0 pg    MCHC 33.3 31.5 - 35.7 g/dL    RDW 12.6 12.3 - 15.4 %    RDW-SD 37.8 37.0 - 54.0 fl    MPV 11.7 6.0 - 12.0 fL    Platelets  196 140 - 450 10*3/mm3    Neutrophil % 48.0 42.7 - 76.0 %    Lymphocyte % 38.7 19.6 - 45.3 %    Monocyte % 11.9 5.0 - 12.0 %    Eosinophil % 0.8 0.3 - 6.2 %    Basophil % 0.3 0.0 - 1.5 %    Immature Grans % 0.3 0.0 - 0.5 %    Neutrophils, Absolute 1.85 1.70 - 7.00 10*3/mm3    Lymphocytes, Absolute 1.49 0.70 - 3.10 10*3/mm3    Monocytes, Absolute 0.46 0.10 - 0.90 10*3/mm3    Eosinophils, Absolute 0.03 0.00 - 0.40 10*3/mm3    Basophils, Absolute 0.01 0.00 - 0.20 10*3/mm3    Immature Grans, Absolute 0.01 0.00 - 0.05 10*3/mm3    nRBC 0.0 0.0 - 0.2 /100 WBC   T4, Free    Specimen: Blood   Result Value Ref Range    Free T4 3.01 (H) 0.93 - 1.70 ng/dL   TSH    Specimen: Blood   Result Value Ref Range    TSH <0.005 (L) 0.270 - 4.200 uIU/mL     Diagnoses and all orders for this visit:    1. Hyperthyroidism (Primary)  Assessment & Plan:  Is taking 10 mg tapazole all the time, sometimes taking 5 mg in the afternoon  Check cmp, cbc and tfts, tsi  Pros and cons of all options of therapy discussed  She is still considering future fertility  F/u 4-6 months     Orders:  -     CBC Auto Differential  -     Comprehensive Metabolic Panel  -     T4, Free  -     TSH  -     Thyroid Stimulating Immunoglobulin  Return in about 6 months (around 1/1/2022) for Recheck.    Marni Pan MD  Signed Marni Pan MD

## 2021-07-06 LAB — TSI SER-ACNC: 88.1 IU/L (ref 0–0.55)

## 2021-07-09 RX ORDER — ATENOLOL 50 MG/1
TABLET ORAL
Qty: 90 TABLET | Refills: 1 | Status: SHIPPED | OUTPATIENT
Start: 2021-07-09 | End: 2022-02-02

## 2021-09-28 RX ORDER — METHIMAZOLE 5 MG/1
TABLET ORAL
Qty: 270 TABLET | Refills: 1 | Status: ON HOLD | OUTPATIENT
Start: 2021-09-28 | End: 2022-03-11

## 2021-11-12 ENCOUNTER — TELEPHONE (OUTPATIENT)
Dept: ENDOCRINOLOGY | Facility: CLINIC | Age: 31
End: 2021-11-12

## 2021-11-12 ENCOUNTER — LAB (OUTPATIENT)
Dept: ENDOCRINOLOGY | Facility: CLINIC | Age: 31
End: 2021-11-12

## 2021-11-12 ENCOUNTER — LAB (OUTPATIENT)
Dept: LAB | Facility: HOSPITAL | Age: 31
End: 2021-11-12

## 2021-11-12 DIAGNOSIS — E05.90 HYPERTHYROIDISM: ICD-10-CM

## 2021-11-12 LAB
T4 FREE SERPL-MCNC: 1.91 NG/DL (ref 0.93–1.7)
TSH SERPL DL<=0.05 MIU/L-ACNC: <0.005 UIU/ML (ref 0.27–4.2)

## 2021-11-12 PROCEDURE — 84439 ASSAY OF FREE THYROXINE: CPT

## 2021-11-12 PROCEDURE — 84443 ASSAY THYROID STIM HORMONE: CPT

## 2021-12-01 ENCOUNTER — OFFICE VISIT (OUTPATIENT)
Dept: ENDOCRINOLOGY | Facility: CLINIC | Age: 31
End: 2021-12-01

## 2021-12-01 ENCOUNTER — LAB (OUTPATIENT)
Dept: LAB | Facility: HOSPITAL | Age: 31
End: 2021-12-01

## 2021-12-01 VITALS
OXYGEN SATURATION: 100 % | SYSTOLIC BLOOD PRESSURE: 110 MMHG | WEIGHT: 168.4 LBS | DIASTOLIC BLOOD PRESSURE: 64 MMHG | BODY MASS INDEX: 28.06 KG/M2 | HEART RATE: 72 BPM | HEIGHT: 65 IN

## 2021-12-01 DIAGNOSIS — E05.90 HYPERTHYROIDISM: Primary | ICD-10-CM

## 2021-12-01 LAB
BASOPHILS # BLD AUTO: 0.02 10*3/MM3 (ref 0–0.2)
BASOPHILS NFR BLD AUTO: 0.4 % (ref 0–1.5)
DEPRECATED RDW RBC AUTO: 37.6 FL (ref 37–54)
EOSINOPHIL # BLD AUTO: 0.07 10*3/MM3 (ref 0–0.4)
EOSINOPHIL NFR BLD AUTO: 1.6 % (ref 0.3–6.2)
ERYTHROCYTE [DISTWIDTH] IN BLOOD BY AUTOMATED COUNT: 12.2 % (ref 12.3–15.4)
HCT VFR BLD AUTO: 38 % (ref 34–46.6)
HGB BLD-MCNC: 12.9 G/DL (ref 12–15.9)
IMM GRANULOCYTES # BLD AUTO: 0.01 10*3/MM3 (ref 0–0.05)
IMM GRANULOCYTES NFR BLD AUTO: 0.2 % (ref 0–0.5)
LYMPHOCYTES # BLD AUTO: 1.78 10*3/MM3 (ref 0.7–3.1)
LYMPHOCYTES NFR BLD AUTO: 39.6 % (ref 19.6–45.3)
MCH RBC QN AUTO: 29.1 PG (ref 26.6–33)
MCHC RBC AUTO-ENTMCNC: 33.9 G/DL (ref 31.5–35.7)
MCV RBC AUTO: 85.6 FL (ref 79–97)
MONOCYTES # BLD AUTO: 0.42 10*3/MM3 (ref 0.1–0.9)
MONOCYTES NFR BLD AUTO: 9.4 % (ref 5–12)
NEUTROPHILS NFR BLD AUTO: 2.19 10*3/MM3 (ref 1.7–7)
NEUTROPHILS NFR BLD AUTO: 48.8 % (ref 42.7–76)
NRBC BLD AUTO-RTO: 0 /100 WBC (ref 0–0.2)
PLATELET # BLD AUTO: 203 10*3/MM3 (ref 140–450)
PMV BLD AUTO: 12 FL (ref 6–12)
RBC # BLD AUTO: 4.44 10*6/MM3 (ref 3.77–5.28)
WBC NRBC COR # BLD: 4.49 10*3/MM3 (ref 3.4–10.8)

## 2021-12-01 PROCEDURE — 99213 OFFICE O/P EST LOW 20 MIN: CPT | Performed by: INTERNAL MEDICINE

## 2021-12-01 PROCEDURE — 80053 COMPREHEN METABOLIC PANEL: CPT | Performed by: INTERNAL MEDICINE

## 2021-12-01 PROCEDURE — 84439 ASSAY OF FREE THYROXINE: CPT | Performed by: INTERNAL MEDICINE

## 2021-12-01 PROCEDURE — 84445 ASSAY OF TSI GLOBULIN: CPT | Performed by: INTERNAL MEDICINE

## 2021-12-01 PROCEDURE — 84443 ASSAY THYROID STIM HORMONE: CPT | Performed by: INTERNAL MEDICINE

## 2021-12-01 PROCEDURE — 85025 COMPLETE CBC W/AUTO DIFF WBC: CPT | Performed by: INTERNAL MEDICINE

## 2021-12-01 NOTE — PROGRESS NOTES
Becky Naqvi 31 y.o.  CC: Hyperthyroidism (Follow Up)      [place]: Hyperthyroidism (Follow Up)    Taking methimazole 15 mg daily   bp is good  Pulse is normal  Taking tenormin 50 mg daily   High TSI reviewed  Options for treatment discussed  She is still considering pregnancy and we discussed today     Allergies   Allergen Reactions   • Oxycodone-Acetaminophen    • Propylthiouracil      Neutropenia       Current Outpatient Medications:   •  atenolol (TENORMIN) 50 MG tablet, TAKE 1 TABLET BY MOUTH EVERY DAY, Disp: 90 tablet, Rfl: 1  •  CBD (cannabidiol) oral oil, Take  by mouth., Disp: , Rfl:   •  methIMAzole (TAPAZOLE) 5 MG tablet, TAKE 2 TABLETS BY MOUTH IN THE MORNING AND 1 TABLET IN THE EVENING, Disp: 270 tablet, Rfl: 1  Patient Active Problem List    Diagnosis    • Tremor [R25.1]    • Abnormal imaging of thyroid [R93.89]    • Delivered by  section [Z38.01]    • Anemia [D64.9]    • Ganglion of hand [M67.449]    • Gastroesophageal reflux disease [K21.9]    • 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   HENT: Negative for congestion and rhinorrhea.    Eyes: Negative for visual disturbance.   Respiratory: Negative for cough and shortness of breath.    Cardiovascular: Negative for palpitations and leg swelling.   Gastrointestinal: Negative for constipation, diarrhea and nausea.   Genitourinary: Negative for hematuria.   Musculoskeletal: Negative for arthralgias, back pain, gait problem, joint swelling and myalgias.   Skin: Negative for color change, rash and wound.   Allergic/Immunologic: Negative for environmental allergies, food allergies and immunocompromised state.   Neurological: Negative for dizziness, weakness and  "light-headedness.   Psychiatric/Behavioral: Negative for confusion, decreased concentration, dysphoric mood and sleep disturbance. The patient is nervous/anxious.      Social History     Socioeconomic History   • Marital status:    Tobacco Use   • Smoking status: Never Smoker   • Smokeless tobacco: Never Used   Substance and Sexual Activity   • Alcohol use: No   • Drug use: No   • Sexual activity: Yes     Partners: Male     Birth control/protection: Condom     Family History   Problem Relation Age of Onset   • Hypertension Father    • Arthritis Father    • Hyperlipidemia Father    • Lung cancer Paternal Uncle    • Heart attack Maternal Grandmother    • Arthritis Maternal Grandmother    • Diabetes Maternal Grandmother    • Lung cancer Paternal Grandmother    • Arthritis Paternal Grandmother    • Heart disease Paternal Grandmother    • COPD Paternal Grandmother    • Graves' disease Mother    • Arthritis Maternal Grandfather    • Arthritis Paternal Grandfather      /64   Pulse 72   Ht 165.1 cm (65\")   Wt 76.4 kg (168 lb 6.4 oz)   SpO2 100%   BMI 28.02 kg/m²   Physical Exam  Vitals and nursing note reviewed.   Constitutional:       Appearance: Normal appearance. She is well-developed.   HENT:      Head: Normocephalic and atraumatic.   Eyes:      General: Lids are normal.      Extraocular Movements: Extraocular movements intact.      Conjunctiva/sclera: Conjunctivae normal.      Pupils: Pupils are equal, round, and reactive to light.   Neck:      Thyroid: No thyroid mass or thyromegaly.      Vascular: No carotid bruit.      Trachea: Trachea normal. No tracheal deviation.   Cardiovascular:      Rate and Rhythm: Normal rate and regular rhythm.      Pulses: Normal pulses.      Heart sounds: Normal heart sounds. No murmur heard.  No friction rub. No gallop.    Pulmonary:      Effort: Pulmonary effort is normal. No respiratory distress.      Breath sounds: Normal breath sounds. No wheezing. "   Musculoskeletal:         General: No deformity. Normal range of motion.      Cervical back: Normal range of motion and neck supple.   Lymphadenopathy:      Cervical: No cervical adenopathy.   Skin:     General: Skin is warm and dry.      Findings: No erythema or rash.      Nails: There is no clubbing.   Neurological:      General: No focal deficit present.      Mental Status: She is alert and oriented to person, place, and time.      Cranial Nerves: No cranial nerve deficit.      Deep Tendon Reflexes: Reflexes are normal and symmetric. Reflexes normal.   Psychiatric:         Mood and Affect: Mood normal.         Speech: Speech normal.         Behavior: Behavior normal.         Thought Content: Thought content normal.         Judgment: Judgment normal.       Results for orders placed or performed in visit on 21   T4, Free    Specimen: Blood   Result Value Ref Range    Free T4 1.91 (H) 0.93 - 1.70 ng/dL   TSH    Specimen: Blood   Result Value Ref Range    TSH <0.005 (L) 0.270 - 4.200 uIU/mL     Diagnoses and all orders for this visit:    1. Hyperthyroidism (Primary)  Assessment & Plan:  Check cmp and cbc  tfts  Taking 15 mg methimazole  Considering pregnancy and we discussed risk related to thyrotoxicosis, storm  Placental transfer of TSI and  hyperthyroidism, goiter discussed  Consideration toward thyroidectomy and expected postoperative course reviewed  Will check tsi today and discuss further     Orders:  -     Comprehensive Metabolic Panel  -     CBC Auto Differential  -     T4, Free  -     TSH  -     Thyroid Stimulating Immunoglobulin  Return in about 6 months (around 2022) for Recheck.    Marni Pan MD

## 2021-12-01 NOTE — ASSESSMENT & PLAN NOTE
Check cmp and cbc  tfts  Taking 15 mg methimazole  Considering pregnancy and we discussed risk related to thyrotoxicosis, storm  Placental transfer of TSI and  hyperthyroidism, goiter discussed  Consideration toward thyroidectomy and expected postoperative course reviewed  Will check tsi today and discuss further

## 2021-12-02 LAB
ALBUMIN SERPL-MCNC: 4.7 G/DL (ref 3.5–5.2)
ALBUMIN/GLOB SERPL: 1.9 G/DL
ALP SERPL-CCNC: 72 U/L (ref 39–117)
ALT SERPL W P-5'-P-CCNC: 32 U/L (ref 1–33)
ANION GAP SERPL CALCULATED.3IONS-SCNC: 8.3 MMOL/L (ref 5–15)
AST SERPL-CCNC: 24 U/L (ref 1–32)
BILIRUB SERPL-MCNC: 0.2 MG/DL (ref 0–1.2)
BUN SERPL-MCNC: 14 MG/DL (ref 6–20)
BUN/CREAT SERPL: 22.6 (ref 7–25)
CALCIUM SPEC-SCNC: 9.5 MG/DL (ref 8.6–10.5)
CHLORIDE SERPL-SCNC: 105 MMOL/L (ref 98–107)
CO2 SERPL-SCNC: 25.7 MMOL/L (ref 22–29)
CREAT SERPL-MCNC: 0.62 MG/DL (ref 0.57–1)
GFR SERPL CREATININE-BSD FRML MDRD: 112 ML/MIN/1.73
GLOBULIN UR ELPH-MCNC: 2.5 GM/DL
GLUCOSE SERPL-MCNC: 105 MG/DL (ref 65–99)
POTASSIUM SERPL-SCNC: 4.3 MMOL/L (ref 3.5–5.2)
PROT SERPL-MCNC: 7.2 G/DL (ref 6–8.5)
SODIUM SERPL-SCNC: 139 MMOL/L (ref 136–145)
T4 FREE SERPL-MCNC: 1.59 NG/DL (ref 0.93–1.7)
TSH SERPL DL<=0.05 MIU/L-ACNC: <0.005 UIU/ML (ref 0.27–4.2)

## 2021-12-04 LAB — TSI SER-ACNC: 63.6 IU/L (ref 0–0.55)

## 2021-12-13 ENCOUNTER — TELEPHONE (OUTPATIENT)
Dept: ENDOCRINOLOGY | Facility: CLINIC | Age: 31
End: 2021-12-13

## 2021-12-13 DIAGNOSIS — E05.00 GRAVES' DISEASE: Primary | ICD-10-CM

## 2021-12-13 NOTE — TELEPHONE ENCOUNTER
CALLED PT IN RE: TO HER PHONE CALL; PT WAS ASKING ABOUT HER REFERRAL FOR A THYROIDECTOMY;I ADVISED THAT DR. HAYES WOULD PLACE THAT REFERRAL/ORDER AND SOME ONE WOULD REACH OUT TO HER FOR SCHEDULING; PT ADVISED SHE MAY NEED TO BE REFERRED TO A GS AT [facility] DUE TO INSURANCE. I INFORMED PT THAT I WOULD SEND THIS MESSAGE TO DR. HAYES. UNDERSTANDING WAS VOICED WITH NO FURTHER QUESTIONS OR CONCERNS.

## 2022-02-02 RX ORDER — ATENOLOL 50 MG/1
TABLET ORAL
Qty: 90 TABLET | Refills: 1 | Status: SHIPPED | OUTPATIENT
Start: 2022-02-02 | End: 2022-06-09 | Stop reason: SDUPTHER

## 2022-03-09 ENCOUNTER — PRE-ADMISSION TESTING (OUTPATIENT)
Dept: PREADMISSION TESTING | Facility: HOSPITAL | Age: 32
End: 2022-03-09

## 2022-03-09 VITALS — WEIGHT: 175.27 LBS | HEIGHT: 65 IN | BODY MASS INDEX: 29.2 KG/M2

## 2022-03-09 LAB
ANION GAP SERPL CALCULATED.3IONS-SCNC: 10 MMOL/L (ref 5–15)
BUN SERPL-MCNC: 9 MG/DL (ref 6–20)
BUN/CREAT SERPL: 14.5 (ref 7–25)
CALCIUM SPEC-SCNC: 9.6 MG/DL (ref 8.6–10.5)
CHLORIDE SERPL-SCNC: 105 MMOL/L (ref 98–107)
CO2 SERPL-SCNC: 25 MMOL/L (ref 22–29)
CREAT SERPL-MCNC: 0.62 MG/DL (ref 0.57–1)
DEPRECATED RDW RBC AUTO: 37.9 FL (ref 37–54)
EGFRCR SERPLBLD CKD-EPI 2021: 120.8 ML/MIN/1.73
ERYTHROCYTE [DISTWIDTH] IN BLOOD BY AUTOMATED COUNT: 12.1 % (ref 12.3–15.4)
GLUCOSE SERPL-MCNC: 91 MG/DL (ref 65–99)
HBA1C MFR BLD: 4.8 % (ref 4.8–5.6)
HCT VFR BLD AUTO: 38.4 % (ref 34–46.6)
HGB BLD-MCNC: 13 G/DL (ref 12–15.9)
MCH RBC QN AUTO: 29.1 PG (ref 26.6–33)
MCHC RBC AUTO-ENTMCNC: 33.9 G/DL (ref 31.5–35.7)
MCV RBC AUTO: 86.1 FL (ref 79–97)
PLATELET # BLD AUTO: 233 10*3/MM3 (ref 140–450)
PMV BLD AUTO: 10.3 FL (ref 6–12)
POTASSIUM SERPL-SCNC: 4.4 MMOL/L (ref 3.5–5.2)
QT INTERVAL: 396 MS
QTC INTERVAL: 421 MS
RBC # BLD AUTO: 4.46 10*6/MM3 (ref 3.77–5.28)
SARS-COV-2 RNA PNL SPEC NAA+PROBE: NOT DETECTED
SODIUM SERPL-SCNC: 140 MMOL/L (ref 136–145)
WBC NRBC COR # BLD: 4.39 10*3/MM3 (ref 3.4–10.8)

## 2022-03-09 PROCEDURE — 85027 COMPLETE CBC AUTOMATED: CPT

## 2022-03-09 PROCEDURE — 93005 ELECTROCARDIOGRAM TRACING: CPT

## 2022-03-09 PROCEDURE — 93010 ELECTROCARDIOGRAM REPORT: CPT | Performed by: INTERNAL MEDICINE

## 2022-03-09 PROCEDURE — 80048 BASIC METABOLIC PNL TOTAL CA: CPT

## 2022-03-09 PROCEDURE — 36415 COLL VENOUS BLD VENIPUNCTURE: CPT

## 2022-03-09 PROCEDURE — 83036 HEMOGLOBIN GLYCOSYLATED A1C: CPT

## 2022-03-09 PROCEDURE — U0004 COV-19 TEST NON-CDC HGH THRU: HCPCS

## 2022-03-09 PROCEDURE — C9803 HOPD COVID-19 SPEC COLLECT: HCPCS

## 2022-03-09 NOTE — PAT
The following information and instructions were given:    Do not eat, drink, smoke or chew gum after midnight the night before surgery. This includes no mints.  Take all routine, prescribed medications including heart and blood pressure medicines with a sip of water unless otherwise instructed by your physician.   Do NOT take diabetic medication unless instructed by your physician.    DO NOT shave for two days before your procedure.  Do not wear makeup.      DO NOT wear fingernail polish (gel/regular) and/or acrylic/artificial nails on the day of surgery.   If you had a recent manicure and would rather not remove polish or artificial nails, the minimum requirement is that the polish/artificial nails must be removed from the middle finger on each hand.      If you are having surgery/procedure on an upper extremity, fingernail polish/artificial fingernails must be removed for surgery.  NO EXCEPTIONS.      If you are having surgery/procedure on a lower extremity, toenail polish on both extremities must be removed for surgery.  NO EXCEPTIONS.    Remove all jewelry (advise to go to jeweler if unable to remove).  Jewelry, especially rings, can no longer be taped for surgery.    Leave anything you consider valuable at home.    Leave your suitcase in the car until after your surgery.    Bring the following with you the day of your procedure (when applicable):       -Picture ID and insurance cards       -Co-pay/deductible required by insurance       -Medications in the original bottles (not a list) including all over-the-counter medications if not brought to PAT       -Copy of advance directive, living will or power of  documents if not brought to PAT       -CPAP or BIPAP mask and tubing (do not bring machine)       -Skin prep instruction(s) sheet       -PAT Pass    Education booklet, brochure, handout or binder related to procedure given to patient.  Education booklet also includes general information related to  their recovery that mentions signs and symptoms of infection and when to call the doctor.    When applicable, an ERAS handout/booklet was given to patient.    Pain Control After Surgery handout given to patient.    Respirex use (handout given to patient) and pneumonia prevention education provided.    Signs and Symptoms of infection discussed with patient in Pre Admission Testing.  Patient instructed to call their doctor if any of the following symptoms are noted during recovery:  Fever of 100.4 F or higher, incision that is warm or has increasing bleeding, redness or drainage.    DVT Prevention instructions given verbally during Pre Admission Testing appointment that stress the importance of ambulation to improve blood circulation.  Also encouraged patient to perform foot exercises when in bed and application of a sequential device may be applied to lower extremities to improve circulation.      Please apply Chlorhexidine wipes to surgical area (if instructed) the night before procedure and the AM of procedure and document date/time of applications on skin prep instruction sheet.      COVID TEST PERFORMED IN PAT TODAY    Patient to apply Chlorhexadine wipes  to surgical area (as instructed) the night before procedure and the AM of procedure. Wipes provided.    Patient instructed to remove all jewelry for procedure.  Patient was instructed that if unable to remove jewelry especially rings to request assistance from a jeweler. Explained that if the piece of jewelry could not be removed before arrival to preop that it will be cut off.  Reinforced with patient that all jewelry must be removed for safety reasons that taping a ring was not an option.  Patient verbalized understanding.

## 2022-03-10 ENCOUNTER — ANESTHESIA EVENT (OUTPATIENT)
Dept: PERIOP | Facility: HOSPITAL | Age: 32
End: 2022-03-10

## 2022-03-10 RX ORDER — SODIUM CHLORIDE 0.9 % (FLUSH) 0.9 %
10 SYRINGE (ML) INJECTION EVERY 12 HOURS SCHEDULED
Status: CANCELLED | OUTPATIENT
Start: 2022-03-10

## 2022-03-10 RX ORDER — FAMOTIDINE 10 MG/ML
20 INJECTION, SOLUTION INTRAVENOUS ONCE
Status: CANCELLED | OUTPATIENT
Start: 2022-03-10 | End: 2022-03-10

## 2022-03-11 ENCOUNTER — ANESTHESIA (OUTPATIENT)
Dept: PERIOP | Facility: HOSPITAL | Age: 32
End: 2022-03-11

## 2022-03-11 ENCOUNTER — HOSPITAL ENCOUNTER (OUTPATIENT)
Facility: HOSPITAL | Age: 32
Discharge: HOME | End: 2022-03-12
Attending: SURGERY | Admitting: SURGERY

## 2022-03-11 DIAGNOSIS — E05.00 GRAVES DISEASE: ICD-10-CM

## 2022-03-11 LAB
B-HCG UR QL: NEGATIVE
CALCIUM SPEC-SCNC: 8.7 MG/DL (ref 8.6–10.5)
CALCIUM SPEC-SCNC: 9.1 MG/DL (ref 8.6–10.5)
EXPIRATION DATE: NORMAL
INTERNAL NEGATIVE CONTROL: NORMAL
INTERNAL POSITIVE CONTROL: NORMAL
Lab: NORMAL
PTH-INTACT SERPL-MCNC: 14.9 PG/ML (ref 15–65)

## 2022-03-11 PROCEDURE — C1889 IMPLANT/INSERT DEVICE, NOC: HCPCS | Performed by: SURGERY

## 2022-03-11 PROCEDURE — 25010000002 FENTANYL CITRATE (PF) 50 MCG/ML SOLUTION: Performed by: NURSE ANESTHETIST, CERTIFIED REGISTERED

## 2022-03-11 PROCEDURE — 25010000002 MIDAZOLAM PER 1 MG: Performed by: ANESTHESIOLOGY

## 2022-03-11 PROCEDURE — G0378 HOSPITAL OBSERVATION PER HR: HCPCS

## 2022-03-11 PROCEDURE — 25010000002 ONDANSETRON PER 1 MG: Performed by: NURSE ANESTHETIST, CERTIFIED REGISTERED

## 2022-03-11 PROCEDURE — 25010000002 SUCCINYLCHOLINE PER 20 MG: Performed by: NURSE ANESTHETIST, CERTIFIED REGISTERED

## 2022-03-11 PROCEDURE — 88307 TISSUE EXAM BY PATHOLOGIST: CPT | Performed by: SURGERY

## 2022-03-11 PROCEDURE — 82310 ASSAY OF CALCIUM: CPT | Performed by: SURGERY

## 2022-03-11 PROCEDURE — 60240 REMOVAL OF THYROID: CPT | Performed by: PHYSICIAN ASSISTANT

## 2022-03-11 PROCEDURE — 25010000002 DEXAMETHASONE PER 1 MG: Performed by: NURSE ANESTHETIST, CERTIFIED REGISTERED

## 2022-03-11 PROCEDURE — 83970 ASSAY OF PARATHORMONE: CPT | Performed by: SURGERY

## 2022-03-11 PROCEDURE — 25010000002 FENTANYL CITRATE (PF) 50 MCG/ML SOLUTION

## 2022-03-11 PROCEDURE — 25010000002 CEFAZOLIN IN DEXTROSE 2-4 GM/100ML-% SOLUTION: Performed by: SURGERY

## 2022-03-11 PROCEDURE — 81025 URINE PREGNANCY TEST: CPT | Performed by: ANESTHESIOLOGY

## 2022-03-11 PROCEDURE — 25010000002 PROPOFOL 10 MG/ML EMULSION: Performed by: NURSE ANESTHETIST, CERTIFIED REGISTERED

## 2022-03-11 PROCEDURE — 25010000002 MIDAZOLAM PER 1 MG: Performed by: NURSE ANESTHETIST, CERTIFIED REGISTERED

## 2022-03-11 DEVICE — HORIZON TI SMALL 6 CLIPS/CART
Type: IMPLANTABLE DEVICE | Site: THYROID | Status: FUNCTIONAL
Brand: WECK

## 2022-03-11 DEVICE — HORIZON TI MED 6/CART
Type: IMPLANTABLE DEVICE | Site: THYROID | Status: FUNCTIONAL
Brand: WECK

## 2022-03-11 DEVICE — ABSORBABLE HEMOSTAT (OXIDIZED REGENERATED CELLULOSE, U.S.P.)
Type: IMPLANTABLE DEVICE | Site: THYROID | Status: FUNCTIONAL
Brand: SURGICEL

## 2022-03-11 RX ORDER — SUCCINYLCHOLINE CHLORIDE 20 MG/ML
INJECTION INTRAMUSCULAR; INTRAVENOUS AS NEEDED
Status: DISCONTINUED | OUTPATIENT
Start: 2022-03-11 | End: 2022-03-11 | Stop reason: SURG

## 2022-03-11 RX ORDER — MEPERIDINE HYDROCHLORIDE 25 MG/ML
12.5 INJECTION INTRAMUSCULAR; INTRAVENOUS; SUBCUTANEOUS
Status: DISCONTINUED | OUTPATIENT
Start: 2022-03-11 | End: 2022-03-11 | Stop reason: HOSPADM

## 2022-03-11 RX ORDER — PROPOFOL 10 MG/ML
VIAL (ML) INTRAVENOUS AS NEEDED
Status: DISCONTINUED | OUTPATIENT
Start: 2022-03-11 | End: 2022-03-11 | Stop reason: SURG

## 2022-03-11 RX ORDER — ONDANSETRON 2 MG/ML
INJECTION INTRAMUSCULAR; INTRAVENOUS AS NEEDED
Status: DISCONTINUED | OUTPATIENT
Start: 2022-03-11 | End: 2022-03-11 | Stop reason: SURG

## 2022-03-11 RX ORDER — HYDROCODONE BITARTRATE AND ACETAMINOPHEN 5; 325 MG/1; MG/1
1 TABLET ORAL EVERY 4 HOURS PRN
Status: DISCONTINUED | OUTPATIENT
Start: 2022-03-11 | End: 2022-03-12 | Stop reason: HOSPADM

## 2022-03-11 RX ORDER — EPHEDRINE SULFATE 50 MG/ML
INJECTION, SOLUTION INTRAVENOUS AS NEEDED
Status: DISCONTINUED | OUTPATIENT
Start: 2022-03-11 | End: 2022-03-11 | Stop reason: SURG

## 2022-03-11 RX ORDER — LIDOCAINE HYDROCHLORIDE 10 MG/ML
INJECTION, SOLUTION EPIDURAL; INFILTRATION; INTRACAUDAL; PERINEURAL AS NEEDED
Status: DISCONTINUED | OUTPATIENT
Start: 2022-03-11 | End: 2022-03-11 | Stop reason: SURG

## 2022-03-11 RX ORDER — CEFAZOLIN SODIUM 2 G/100ML
2 INJECTION, SOLUTION INTRAVENOUS ONCE
Status: COMPLETED | OUTPATIENT
Start: 2022-03-11 | End: 2022-03-11

## 2022-03-11 RX ORDER — LIDOCAINE HYDROCHLORIDE 10 MG/ML
0.5 INJECTION, SOLUTION EPIDURAL; INFILTRATION; INTRACAUDAL; PERINEURAL ONCE AS NEEDED
Status: COMPLETED | OUTPATIENT
Start: 2022-03-11 | End: 2022-03-11

## 2022-03-11 RX ORDER — NALOXONE HCL 0.4 MG/ML
0.1 VIAL (ML) INJECTION
Status: DISCONTINUED | OUTPATIENT
Start: 2022-03-11 | End: 2022-03-12 | Stop reason: HOSPADM

## 2022-03-11 RX ORDER — CALCIUM CARBONATE 200(500)MG
2 TABLET,CHEWABLE ORAL 2 TIMES DAILY
Status: DISCONTINUED | OUTPATIENT
Start: 2022-03-11 | End: 2022-03-11

## 2022-03-11 RX ORDER — MIDAZOLAM HYDROCHLORIDE 1 MG/ML
INJECTION INTRAMUSCULAR; INTRAVENOUS AS NEEDED
Status: DISCONTINUED | OUTPATIENT
Start: 2022-03-11 | End: 2022-03-11 | Stop reason: SURG

## 2022-03-11 RX ORDER — ONDANSETRON 2 MG/ML
4 INJECTION INTRAMUSCULAR; INTRAVENOUS ONCE AS NEEDED
Status: DISCONTINUED | OUTPATIENT
Start: 2022-03-11 | End: 2022-03-11 | Stop reason: HOSPADM

## 2022-03-11 RX ORDER — SODIUM CHLORIDE, SODIUM LACTATE, POTASSIUM CHLORIDE, CALCIUM CHLORIDE 600; 310; 30; 20 MG/100ML; MG/100ML; MG/100ML; MG/100ML
INJECTION, SOLUTION INTRAVENOUS CONTINUOUS PRN
Status: DISCONTINUED | OUTPATIENT
Start: 2022-03-11 | End: 2022-03-11 | Stop reason: SURG

## 2022-03-11 RX ORDER — FAMOTIDINE 20 MG/1
20 TABLET, FILM COATED ORAL ONCE
Status: COMPLETED | OUTPATIENT
Start: 2022-03-11 | End: 2022-03-11

## 2022-03-11 RX ORDER — SODIUM CHLORIDE 0.9 % (FLUSH) 0.9 %
3-10 SYRINGE (ML) INJECTION AS NEEDED
Status: DISCONTINUED | OUTPATIENT
Start: 2022-03-11 | End: 2022-03-11 | Stop reason: HOSPADM

## 2022-03-11 RX ORDER — SODIUM CHLORIDE, SODIUM LACTATE, POTASSIUM CHLORIDE, CALCIUM CHLORIDE 600; 310; 30; 20 MG/100ML; MG/100ML; MG/100ML; MG/100ML
9 INJECTION, SOLUTION INTRAVENOUS CONTINUOUS
Status: DISCONTINUED | OUTPATIENT
Start: 2022-03-11 | End: 2022-03-11

## 2022-03-11 RX ORDER — LEVOTHYROXINE SODIUM 0.12 MG/1
125 TABLET ORAL
Status: DISCONTINUED | OUTPATIENT
Start: 2022-03-12 | End: 2022-03-12 | Stop reason: HOSPADM

## 2022-03-11 RX ORDER — DROPERIDOL 2.5 MG/ML
0.62 INJECTION, SOLUTION INTRAMUSCULAR; INTRAVENOUS ONCE AS NEEDED
Status: DISCONTINUED | OUTPATIENT
Start: 2022-03-11 | End: 2022-03-11 | Stop reason: HOSPADM

## 2022-03-11 RX ORDER — ONDANSETRON 4 MG/1
4 TABLET, FILM COATED ORAL EVERY 6 HOURS PRN
Status: DISCONTINUED | OUTPATIENT
Start: 2022-03-11 | End: 2022-03-12 | Stop reason: HOSPADM

## 2022-03-11 RX ORDER — DOCUSATE SODIUM 100 MG/1
100 CAPSULE, LIQUID FILLED ORAL 2 TIMES DAILY PRN
Status: DISCONTINUED | OUTPATIENT
Start: 2022-03-11 | End: 2022-03-12 | Stop reason: HOSPADM

## 2022-03-11 RX ORDER — FENTANYL CITRATE 50 UG/ML
INJECTION, SOLUTION INTRAMUSCULAR; INTRAVENOUS AS NEEDED
Status: DISCONTINUED | OUTPATIENT
Start: 2022-03-11 | End: 2022-03-11 | Stop reason: SURG

## 2022-03-11 RX ORDER — PROMETHAZINE HYDROCHLORIDE 25 MG/1
25 TABLET ORAL ONCE AS NEEDED
Status: DISCONTINUED | OUTPATIENT
Start: 2022-03-11 | End: 2022-03-11 | Stop reason: HOSPADM

## 2022-03-11 RX ORDER — ATENOLOL 50 MG/1
50 TABLET ORAL DAILY
Status: DISCONTINUED | OUTPATIENT
Start: 2022-03-12 | End: 2022-03-12 | Stop reason: HOSPADM

## 2022-03-11 RX ORDER — DEXAMETHASONE SODIUM PHOSPHATE 4 MG/ML
INJECTION, SOLUTION INTRA-ARTICULAR; INTRALESIONAL; INTRAMUSCULAR; INTRAVENOUS; SOFT TISSUE AS NEEDED
Status: DISCONTINUED | OUTPATIENT
Start: 2022-03-11 | End: 2022-03-11 | Stop reason: SURG

## 2022-03-11 RX ORDER — MIDAZOLAM HYDROCHLORIDE 1 MG/ML
1 INJECTION INTRAMUSCULAR; INTRAVENOUS
Status: DISCONTINUED | OUTPATIENT
Start: 2022-03-11 | End: 2022-03-11 | Stop reason: HOSPADM

## 2022-03-11 RX ORDER — FENTANYL CITRATE 50 UG/ML
INJECTION, SOLUTION INTRAMUSCULAR; INTRAVENOUS
Status: COMPLETED
Start: 2022-03-11 | End: 2022-03-11

## 2022-03-11 RX ORDER — NALOXONE HCL 0.4 MG/ML
0.4 VIAL (ML) INJECTION AS NEEDED
Status: DISCONTINUED | OUTPATIENT
Start: 2022-03-11 | End: 2022-03-11 | Stop reason: HOSPADM

## 2022-03-11 RX ORDER — SODIUM CHLORIDE 0.9 % (FLUSH) 0.9 %
3 SYRINGE (ML) INJECTION EVERY 12 HOURS SCHEDULED
Status: DISCONTINUED | OUTPATIENT
Start: 2022-03-11 | End: 2022-03-11 | Stop reason: HOSPADM

## 2022-03-11 RX ORDER — MAGNESIUM HYDROXIDE 1200 MG/15ML
LIQUID ORAL AS NEEDED
Status: DISCONTINUED | OUTPATIENT
Start: 2022-03-11 | End: 2022-03-11 | Stop reason: HOSPADM

## 2022-03-11 RX ORDER — PROMETHAZINE HYDROCHLORIDE 25 MG/1
25 SUPPOSITORY RECTAL ONCE AS NEEDED
Status: DISCONTINUED | OUTPATIENT
Start: 2022-03-11 | End: 2022-03-11 | Stop reason: HOSPADM

## 2022-03-11 RX ORDER — ACETAMINOPHEN 500 MG
1000 TABLET ORAL EVERY 6 HOURS PRN
Status: DISCONTINUED | OUTPATIENT
Start: 2022-03-11 | End: 2022-03-12 | Stop reason: HOSPADM

## 2022-03-11 RX ORDER — ONDANSETRON 2 MG/ML
4 INJECTION INTRAMUSCULAR; INTRAVENOUS EVERY 6 HOURS PRN
Status: DISCONTINUED | OUTPATIENT
Start: 2022-03-11 | End: 2022-03-12 | Stop reason: HOSPADM

## 2022-03-11 RX ORDER — HYDROCODONE BITARTRATE AND ACETAMINOPHEN 5; 325 MG/1; MG/1
1 TABLET ORAL ONCE AS NEEDED
Status: DISCONTINUED | OUTPATIENT
Start: 2022-03-11 | End: 2022-03-11 | Stop reason: HOSPADM

## 2022-03-11 RX ORDER — IPRATROPIUM BROMIDE AND ALBUTEROL SULFATE 2.5; .5 MG/3ML; MG/3ML
3 SOLUTION RESPIRATORY (INHALATION) ONCE AS NEEDED
Status: DISCONTINUED | OUTPATIENT
Start: 2022-03-11 | End: 2022-03-11 | Stop reason: HOSPADM

## 2022-03-11 RX ORDER — FENTANYL CITRATE 50 UG/ML
50 INJECTION, SOLUTION INTRAMUSCULAR; INTRAVENOUS
Status: DISCONTINUED | OUTPATIENT
Start: 2022-03-11 | End: 2022-03-11 | Stop reason: HOSPADM

## 2022-03-11 RX ORDER — FAMOTIDINE 20 MG/1
20 TABLET, FILM COATED ORAL 2 TIMES DAILY
Status: DISCONTINUED | OUTPATIENT
Start: 2022-03-11 | End: 2022-03-12 | Stop reason: HOSPADM

## 2022-03-11 RX ORDER — HYDROMORPHONE HYDROCHLORIDE 1 MG/ML
0.5 INJECTION, SOLUTION INTRAMUSCULAR; INTRAVENOUS; SUBCUTANEOUS
Status: DISCONTINUED | OUTPATIENT
Start: 2022-03-11 | End: 2022-03-11 | Stop reason: HOSPADM

## 2022-03-11 RX ORDER — CALCIUM CARBONATE 200(500)MG
2 TABLET,CHEWABLE ORAL 2 TIMES DAILY
Status: DISCONTINUED | OUTPATIENT
Start: 2022-03-11 | End: 2022-03-12

## 2022-03-11 RX ORDER — CALCIUM CARBONATE 200(500)MG
2 TABLET,CHEWABLE ORAL ONCE
Status: COMPLETED | OUTPATIENT
Start: 2022-03-11 | End: 2022-03-11

## 2022-03-11 RX ORDER — DROPERIDOL 2.5 MG/ML
0.62 INJECTION, SOLUTION INTRAMUSCULAR; INTRAVENOUS AS NEEDED
Status: DISCONTINUED | OUTPATIENT
Start: 2022-03-11 | End: 2022-03-11 | Stop reason: HOSPADM

## 2022-03-11 RX ORDER — DEXTROSE AND SODIUM CHLORIDE 5; .45 G/100ML; G/100ML
75 INJECTION, SOLUTION INTRAVENOUS CONTINUOUS
Status: DISCONTINUED | OUTPATIENT
Start: 2022-03-11 | End: 2022-03-12 | Stop reason: HOSPADM

## 2022-03-11 RX ORDER — LABETALOL HYDROCHLORIDE 5 MG/ML
5 INJECTION, SOLUTION INTRAVENOUS
Status: DISCONTINUED | OUTPATIENT
Start: 2022-03-11 | End: 2022-03-11 | Stop reason: HOSPADM

## 2022-03-11 RX ORDER — SODIUM CHLORIDE 0.9 % (FLUSH) 0.9 %
10 SYRINGE (ML) INJECTION AS NEEDED
Status: DISCONTINUED | OUTPATIENT
Start: 2022-03-11 | End: 2022-03-11 | Stop reason: HOSPADM

## 2022-03-11 RX ADMIN — FENTANYL CITRATE 100 MCG: 50 INJECTION, SOLUTION INTRAMUSCULAR; INTRAVENOUS at 09:23

## 2022-03-11 RX ADMIN — SODIUM CHLORIDE, POTASSIUM CHLORIDE, SODIUM LACTATE AND CALCIUM CHLORIDE 9 ML/HR: 600; 310; 30; 20 INJECTION, SOLUTION INTRAVENOUS at 08:14

## 2022-03-11 RX ADMIN — FAMOTIDINE 20 MG: 20 TABLET, FILM COATED ORAL at 08:14

## 2022-03-11 RX ADMIN — ANTACID TABLETS 2 TABLET: 500 TABLET, CHEWABLE ORAL at 19:50

## 2022-03-11 RX ADMIN — MIDAZOLAM 1 MG: 1 INJECTION INTRAMUSCULAR; INTRAVENOUS at 08:19

## 2022-03-11 RX ADMIN — LIDOCAINE HYDROCHLORIDE 50 MG: 10 INJECTION, SOLUTION EPIDURAL; INFILTRATION; INTRACAUDAL; PERINEURAL at 09:23

## 2022-03-11 RX ADMIN — FENTANYL CITRATE 50 MCG: 50 INJECTION, SOLUTION INTRAMUSCULAR; INTRAVENOUS at 12:19

## 2022-03-11 RX ADMIN — FAMOTIDINE 20 MG: 20 TABLET, FILM COATED ORAL at 20:00

## 2022-03-11 RX ADMIN — FENTANYL CITRATE 50 MCG: 50 INJECTION, SOLUTION INTRAMUSCULAR; INTRAVENOUS at 10:42

## 2022-03-11 RX ADMIN — SUCCINYLCHOLINE CHLORIDE 100 MG: 20 INJECTION, SOLUTION INTRAMUSCULAR; INTRAVENOUS at 09:23

## 2022-03-11 RX ADMIN — DEXTROSE AND SODIUM CHLORIDE 75 ML/HR: 5; 450 INJECTION, SOLUTION INTRAVENOUS at 13:04

## 2022-03-11 RX ADMIN — FENTANYL CITRATE 50 MCG: 50 INJECTION, SOLUTION INTRAMUSCULAR; INTRAVENOUS at 10:10

## 2022-03-11 RX ADMIN — LIDOCAINE HYDROCHLORIDE 0.5 ML: 10 INJECTION, SOLUTION EPIDURAL; INFILTRATION; INTRACAUDAL; PERINEURAL at 08:14

## 2022-03-11 RX ADMIN — HYDROCODONE BITARTRATE AND ACETAMINOPHEN 1 TABLET: 5; 325 TABLET ORAL at 21:45

## 2022-03-11 RX ADMIN — SODIUM CHLORIDE, POTASSIUM CHLORIDE, SODIUM LACTATE AND CALCIUM CHLORIDE: 600; 310; 30; 20 INJECTION, SOLUTION INTRAVENOUS at 11:16

## 2022-03-11 RX ADMIN — DEXAMETHASONE SODIUM PHOSPHATE 8 MG: 4 INJECTION, SOLUTION INTRA-ARTICULAR; INTRALESIONAL; INTRAMUSCULAR; INTRAVENOUS; SOFT TISSUE at 09:28

## 2022-03-11 RX ADMIN — MIDAZOLAM HYDROCHLORIDE 2 MG: 1 INJECTION, SOLUTION INTRAMUSCULAR; INTRAVENOUS at 09:21

## 2022-03-11 RX ADMIN — PROPOFOL 100 MCG/KG/MIN: 10 INJECTION, EMULSION INTRAVENOUS at 09:23

## 2022-03-11 RX ADMIN — EPHEDRINE SULFATE 5 MG: 50 INJECTION INTRAVENOUS at 10:31

## 2022-03-11 RX ADMIN — ANTACID TABLETS 2 TABLET: 500 TABLET, CHEWABLE ORAL at 17:50

## 2022-03-11 RX ADMIN — PROPOFOL 200 MG: 10 INJECTION, EMULSION INTRAVENOUS at 09:23

## 2022-03-11 RX ADMIN — CEFAZOLIN SODIUM 2 G: 2 INJECTION, SOLUTION INTRAVENOUS at 09:23

## 2022-03-11 RX ADMIN — SODIUM CHLORIDE, POTASSIUM CHLORIDE, SODIUM LACTATE AND CALCIUM CHLORIDE: 600; 310; 30; 20 INJECTION, SOLUTION INTRAVENOUS at 09:17

## 2022-03-11 RX ADMIN — ONDANSETRON 4 MG: 2 INJECTION INTRAMUSCULAR; INTRAVENOUS at 11:11

## 2022-03-11 NOTE — ANESTHESIA POSTPROCEDURE EVALUATION
Patient: Becky Naqvi    Procedure Summary     Date: 03/11/22 Room / Location:  [facility] /  [facility]    Anesthesia Start: 0917 Anesthesia Stop:     Procedure: TOTAL THYROIDECTOMY (N/A Neck) Diagnosis: Graves disease    Surgeons: Bear Sharpe MD Provider: Nikko Frausto MD    Anesthesia Type: general ASA Status: 2          Anesthesia Type: general    Vitals  No vitals data found for the desired time range.          Post Anesthesia Care and Evaluation    Patient location during evaluation: PACU  Patient participation: waiting for patient participation  Level of consciousness: responsive to light touch  Airway patency: patent  Anesthetic complications: No anesthetic complications  PONV Status: NA  Cardiovascular status: hemodynamically stable and acceptable  Respiratory status: nonlabored ventilation, acceptable, nasal cannula and oral airway  Hydration status: acceptable

## 2022-03-11 NOTE — OP NOTE
General Surgery Operative Note    THYROIDECTOMY  Procedure Report    Patient Name:  Becky Naqvi  YOB: 1990  [id number]     Date of Surgery:  3/11/2022       Pre-op Diagnosis: Graves disease    Post-op Diagnosis: Graves disease      Procedure(s):  TOTAL THYROIDECTOMY    Surgeon: Bear Sharpe MD    Assistant: Assistant: Miriam Hamlin PA-C     Anesthesia: General    Estimated Blood Loss: minimal    Implants:    Implant Name Type Inv. Item Serial No.  Lot No. LRB No. Used Action   CLIP LIGAT VASC HORIZON TI MD SHERRILL 6CT - [id number] Implant CLIP LIGAT VASC HORIZON TI MD SHERRILL 6CT  [employer] [id number] N/A 1 Implanted   CLIP LIGAT VASC HORIZON TI MD SHERRILL 6CT - [id number] Implant CLIP LIGAT VASC HORIZON TI MD SHERRILL 6CT  [employer]  N/A 4 Implanted   CLIP LIGAT VASC HORIZON TI SM YEL 6CT - [id number] Implant CLIP LIGAT VASC HORIZON TI SM YEL 6CT  [employer] [id number] N/A 7 Implanted   HEMOST ABS SURGICEL 4X8IN - [id number] Implant HEMOST ABS SURGICEL 4X8IN  [employer]  DIV OF J AND J  N/A 1 Implanted       Specimen:          Specimens     ID Source Type Tests Collected By Collected At Frozen?    A Thyroid Tissue · TISSUE PATHOLOGY EXAM   Bear Sharpe MD 3/11/22 0953     Description: right thyroid lobe    This specimen was not marked as sent.    B Thyroid Tissue · TISSUE PATHOLOGY EXAM   Bear Sharpe MD 3/11/22 1035     Description: left thyroid lobe    This specimen was not marked as sent.              Findings: Diffusely inflamed and enlarged thyroid gland    Indications: The patient is a 32-year-old female with a history of Graves' disease.  This is well controlled but the patient is interested in pregnancy and was interested in surgical treatment for definitive treatment of the disease.  We discussed the risks benefits and alternatives to total thyroidectomy and the patient was found to be agreeable.  Informed consent was obtained.    Description  of Procedure:     After obtaining informed consent, the patient was taken to the operating room and placed in supine position. After appropriate DVT and antibiotic prophylaxis, general anesthesia was induced with a placement of an Nims tube for nerve monitoring. The neck was prepped and draped in standard sterile fashion.    An approximately 6 cm incision was made 2 fingerbreadths superior to the suprasternal notch transversely across the midline neck.  The skin was incised using a 15 blade.  The dermis and subcutaneous tissue were divided using Bovie electrocautery.  The platysma was dissected out and divided using Bovie electrocautery.  Subplatysmal flaps were created superiorly to the thyroid cartilage, inferiorly to the sternal notch, and laterally to the sternocleidomastoid muscles.  The strap muscles were divided in the midline at the median raphae.  Attention was first paid to the right thyroid lobe.  The strap muscles were dissected off the anterior and lateral surfaces of the thyroid lobe using Bovie electrocautery.  Babcocks were placed on the thyroid lobe and it was retracted anteriorly and medially.  Superior thyroid vessels were ligated with combination of clips and LigaSure.  The superior and inferior parathyroid glands were identified and dissected away from the thyroid lobe with blood supply left intact.  The recurrent laryngeal nerve was identified by both nerve stimulation and visualization.  Soft tissues around this area were dissected using bipolar electrocautery and small vessels ligated with clips.  Once the thyroid was dissected away from the area of the nerve the remainder the thyroid lobe was dissected off the anterior surface of the trachea using bipolar cautery.  Inferior thyroid vessels were ligated with combination of clips and LigaSure. The thyroid was then transected at the level of the isthmus using the Ligasure device. The specimen was then removed from the body and inspected for  parathyroid tissue, and none was noted. It was then sent to pathology as a permanent specimen.    Attention was then paid to the left thyroid lobe. The strap muscles were dissected off the anterior and lateral surfaces of the thyroid lobe using Bovie electrocautery.  Babcocks were placed on the thyroid lobe and it was retracted anteriorly and medially.  Superior thyroid vessels were ligated with combination of clips and LigaSure.  The superior and inferior parathyroid glands were identified and dissected away from the thyroid lobe with blood supply left intact.  The recurrent laryngeal nerve was identified by both nerve stimulation and visualization.  Soft tissues around this area were dissected using bipolar electrocautery and small vessels ligated with clips.  Once the thyroid was dissected away from the area of the nerve the remainder the thyroid lobe was dissected off the anterior surface of the trachea using bipolar cautery.  Inferior thyroid vessels were ligated with combination of clips and LigaSure. The specimen was then removed from the body and inspected for parathyroid tissue, and none was noted. It was then sent to pathology as a permanent specimen.    Valsalva maneuver was performed by anesthesia to assess for any further bleeding and any further bleeding was controlled using placement of clips.  After hemostasis was obtained both the left and right recurrent laryngeal nerves were grossly intact throughout their entire visualized course and stimulated appropriately with nerve stimulation.  Surgicel was placed in all areas of dissection.  The strap muscles were loosely reapproximated using interrupted 3-0 Vicryl.  The platysma was reapproximated using interrupted 3-0 Vicryl.  The skin was reapproximated using a running subcuticular 4-0 Monocryl.  A dry dressing was placed on top of this.  The patient awoke from anesthesia without complications and was taken to the PACU in stable  condition.        Assistant: Miriam Hamlin PA-C  was responsible for performing the following activities: Retraction, Suction, Suturing, Closing and Placing Dressing and their skilled assistance was necessary for the success of this case.    Bear Sharpe MD     Date: 3/11/2022  Time: 11:20 EST

## 2022-03-11 NOTE — H&P
Pre-Op H&P  Becky Naqvi  [id number]  1990      Chief complaint: Graves disease      Subjective:  Patient is a 32 y.o.female presents for scheduled surgery by Dr. Sharpe. She anticipates a TOTAL THYROIDECTOMY today.  She is followed by endocrinology for Graves' disease.  She has previously been well controlled on methimazole.  Her TSI has been increasing recently.  She reports some dysphagia, dysphonia and positional dyspnea.      Review of Systems:  Constitutional-- No fever, chills or sweats. No fatigue.  CV-- No chest pain, palpitation or syncope. +HTN  Resp-- No SOB, cough, hemoptysis  Skin--No rashes or lesions      Allergies:   Allergies   Allergen Reactions   • Oxycodone-Acetaminophen Itching   • Propylthiouracil Other (See Comments)     Neutropenia         Home Meds:  Medications Prior to Admission   Medication Sig Dispense Refill Last Dose   • atenolol (TENORMIN) 50 MG tablet TAKE 1 TABLET BY MOUTH EVERY DAY (Patient taking differently: Take 50 mg by mouth Daily.) 90 tablet 1 3/10/2022 at 0700   • CBD (cannabidiol) oral oil Take  by mouth. STOPPED 3/1/22   Past Week at Unknown time   • HYDROXYZINE HCL PO Take 1 tablet by mouth Daily As Needed (ANXIETY).   Past Week at Unknown time   • methIMAzole (TAPAZOLE) 5 MG tablet TAKE 2 TABLETS BY MOUTH IN THE MORNING AND 1 TABLET IN THE EVENING (Patient taking differently: Take 5 mg by mouth. TAKE 2 TABLETS BY MOUTH IN THE MORNING AND 1 TABLET IN THE EVENING) 270 tablet 1 Past Week at Unknown time         PMH:   Past Medical History:   Diagnosis Date   • Anemia     In the past   • Anxiety    • Genital warts    • GERD (gastroesophageal reflux disease)    • Graves disease    • Umbilical hernia 10/2019   • Wears glasses      PSH:    Past Surgical History:   Procedure Laterality Date   •  SECTION      2016   •  SECTION N/A 2018    Procedure:  SECTION REPEAT * 40 WKS *;  Surgeon: Becky Lobo MD;  Location: [facility]  "DELIVERY;  Service: Obstetrics/Gynecology   • COLONOSCOPY     • KNEE ACL RECONSTRUCTION     • KNEE ARTHROSCOPY W/ MENISCAL REPAIR     • WISDOM TOOTH EXTRACTION         Immunization History:  Influenza: No  Pneumococcal: No  Tetanus: UTD  Covid x2: 2021    Social History:   Tobacco:   Social History     Tobacco Use   Smoking Status Never Smoker   Smokeless Tobacco Never Used      Alcohol:     Social History     Substance and Sexual Activity   Alcohol Use Yes    Comment: SOCIAL         Physical Exam:/89 (BP Location: Right arm, Patient Position: Lying)   Pulse 90   Temp 98.9 °F (37.2 °C) (Temporal)   Resp 18   Ht 165.1 cm (65\")   Wt 79.5 kg (175 lb 4.3 oz)   SpO2 98%   BMI 29.17 kg/m²       General Appearance:    Alert, cooperative, no distress, appears stated age   Head:    Normocephalic, without obvious abnormality, atraumatic   Lungs:     Clear to auscultation bilaterally, respirations unlabored    Heart:   Regular rate and rhythm, S1 and S2 normal    Abdomen:    Soft without tenderness   Extremities:   Extremities normal, atraumatic, no cyanosis or edema   Skin:   Skin color, texture, turgor normal, no rashes or lesions   Neurologic:   Grossly intact     Results Review:     LABS:  Lab Results   Component Value Date    WBC 4.39 03/09/2022    HGB 13.0 03/09/2022    HCT 38.4 03/09/2022    MCV 86.1 03/09/2022     03/09/2022    NEUTROABS 2.19 12/01/2021    GLUCOSE 91 03/09/2022    BUN 9 03/09/2022    CREATININE 0.62 03/09/2022    EGFRIFNONA 112 12/01/2021     03/09/2022    K 4.4 03/09/2022     03/09/2022    CO2 25.0 03/09/2022    CALCIUM 9.6 03/09/2022    ALBUMIN 4.70 12/01/2021    AST 24 12/01/2021    ALT 32 12/01/2021    BILITOT 0.2 12/01/2021      Latest Reference Range & Units 12/01/21 13:30   TSH Baseline 0.270 - 4.200 uIU/mL <0.005 (L)   Free T4 0.93 - 1.70 ng/dL 1.59   Thyroid Stimulating Immunoglobulin 0.00 - 0.55 IU/L 63.60 (H) [1]   (L): Data is abnormally low  (H): Data is " abnormally high  [1] **Results verified by repeat testing**  RADIOLOGY:  Imaging Results (Last 72 Hours)     ** No results found for the last 72 hours. **          I reviewed the patient's new clinical results.    Cancer Staging (if applicable)  Cancer Patient: __ yes __no __unknown; If yes, clinical stage T:__ N:__M:__, stage group or __N/A      Impression: Graves disease      Plan: TOTAL THYROIDECTOMY      Latricia Lindsey, JOAQUIN   3/11/2022   08:20 EST

## 2022-03-11 NOTE — ANESTHESIA PROCEDURE NOTES
Airway  Urgency: elective    Date/Time: 3/11/2022 9:23 AM  Airway not difficult    General Information and Staff    Patient location during procedure: OR  CRNA: Baudilio Clayton CRNA    Indications and Patient Condition  Indications for airway management: airway protection    Preoxygenated: yes  MILS not maintained throughout  Mask difficulty assessment: 1 - vent by mask    Final Airway Details  Final airway type: endotracheal airway      Successful airway: ETT and NIM tube  Cuffed: yes   Successful intubation technique: video laryngoscopy  Endotracheal tube insertion site: oral  Blade: Morejon  Blade size: 3  ETT size (mm): 6.0  Cormack-Lehane Classification: grade I - full view of glottis  Placement verified by: chest auscultation and capnometry   Measured from: lips  ETT/EBT  to lips (cm): 20  Number of attempts at approach: 1  Assessment: lips, teeth, and gum same as pre-op and atraumatic intubation    Additional Comments  Negative epigastric sounds, Breath sound equal bilaterally with symmetric chest rise and fall.  Surgeon requests Morejon for ETT placement confirmation.

## 2022-03-11 NOTE — PROGRESS NOTES
"Patient Name:  Becky Naqvi  YOB: 1990  [id number]    Surgery Progress Note    Date of visit: 3/11/2022    Subjective   Minimal pain. No nausea/vomiting, tolerating clear liquid diet. No fevers/chills. Possible mild tingling in hands.         Objective       /58 (BP Location: Right arm, Patient Position: Lying)   Pulse 73   Temp 97.8 °F (36.6 °C) (Oral)   Resp 18   Ht 165.1 cm (65\")   Wt 79.5 kg (175 lb 4.3 oz)   SpO2 99%   BMI 29.17 kg/m²     Intake/Output Summary (Last 24 hours) at 3/11/2022 1740  Last data filed at 3/11/2022 1700  Gross per 24 hour   Intake 1000 ml   Output 1810 ml   Net -810 ml       CV:  Rhythm regular and rate regular  L:  Clear to auscultation bilaterally  Abd:  Bowel sounds positive, soft  Ext:  No cyanosis, clubbing, edema  HEENT: Neck flat, dressing c/d/i    Recent labs and imaging that are back at this time have been reviewed.   Ca 8.7  PTH 15       Assessment/Plan     Pt POD#0 total thyroidectomy  Pain control  Advance diet as tolerated, HLIV  Start TUMS BID  OOB, IS, DVT prlx        Bear Sharpe MD  3/11/2022  17:40 EST      "

## 2022-03-11 NOTE — ANESTHESIA PREPROCEDURE EVALUATION
Anesthesia Evaluation                  Airway   Mallampati: I  TM distance: >3 FB  Neck ROM: full  No difficulty expected  Dental      Pulmonary    Cardiovascular     ECG reviewed        Neuro/Psych  (+) tremors, psychiatric history Anxiety and Depression,    GI/Hepatic/Renal/Endo    (+)  GERD,  thyroid problem hyperthyroidism    Musculoskeletal     (+) back pain,   Abdominal    Substance History      OB/GYN          Other                        Anesthesia Plan    ASA 2     general     intravenous induction     Anesthetic plan, all risks, benefits, and alternatives have been provided, discussed and informed consent has been obtained with: patient.    Plan discussed with CRNA.        CODE STATUS:

## 2022-03-11 NOTE — BRIEF OP NOTE
THYROIDECTOMY  Progress Note    Becky Naqvi  3/11/2022    Pre-op Diagnosis:   Graves disease        Post-Op Diagnosis Codes:     * Graves disease [E05.00]    Procedure/CPT® Codes:        Procedure(s):  TOTAL THYROIDECTOMY    Surgeon(s):  Bear Sharpe MD    Anesthesia: General    Staff:   Circulator: Rut Armas RN; Mila Flores RN  Scrub Person: Annalee, April; Brandon Fletcher  Nursing Assistant: Ha Mcneal; Mariana Lennon PCT  Assistant: Miriam Hamlin PA-C  Assistant: Miriam Hamlin PA-C      Estimated Blood Loss: minimal    Urine Voided: * No values recorded between 3/11/2022  9:17 AM and 3/11/2022 11:09 AM *    Specimens:                Specimens     ID Source Type Tests Collected By Collected At Frozen?    A Thyroid Tissue · TISSUE PATHOLOGY EXAM   Bear Sharpe MD 3/11/22 0953     Description: right thyroid lobe    This specimen was not marked as sent.    B Thyroid Tissue · TISSUE PATHOLOGY EXAM   Bear Sharpe MD 3/11/22 1035     Description: left thyroid lobe    This specimen was not marked as sent.                Drains: * No LDAs found *    Findings: Diffusely inflamed and enlarged thyroid gland    Complications: none    Assistant: Miriam Hamlin PA-C  was responsible for performing the following activities: Retraction, Suction, Suturing, Closing and Placing Dressing and their skilled assistance was necessary for the success of this case.    Bear Sharpe MD     Date: 3/11/2022  Time: 11:19 EST

## 2022-03-12 VITALS
HEIGHT: 65 IN | HEART RATE: 102 BPM | RESPIRATION RATE: 18 BRPM | TEMPERATURE: 98 F | BODY MASS INDEX: 29.2 KG/M2 | OXYGEN SATURATION: 97 % | WEIGHT: 175.27 LBS | DIASTOLIC BLOOD PRESSURE: 74 MMHG | SYSTOLIC BLOOD PRESSURE: 107 MMHG

## 2022-03-12 LAB — CALCIUM SPEC-SCNC: 8.3 MG/DL (ref 8.6–10.5)

## 2022-03-12 PROCEDURE — 82310 ASSAY OF CALCIUM: CPT | Performed by: SURGERY

## 2022-03-12 PROCEDURE — G0378 HOSPITAL OBSERVATION PER HR: HCPCS

## 2022-03-12 RX ORDER — CALCITRIOL 0.5 UG/1
0.5 CAPSULE, LIQUID FILLED ORAL EVERY 12 HOURS SCHEDULED
Qty: 60 CAPSULE | Refills: 3 | Status: SHIPPED | OUTPATIENT
Start: 2022-03-12 | End: 2022-06-09

## 2022-03-12 RX ORDER — HYDROCODONE BITARTRATE AND ACETAMINOPHEN 5; 325 MG/1; MG/1
1 TABLET ORAL EVERY 6 HOURS PRN
Qty: 12 TABLET | Refills: 0 | Status: SHIPPED | OUTPATIENT
Start: 2022-03-12 | End: 2022-08-02

## 2022-03-12 RX ORDER — LEVOTHYROXINE SODIUM 0.12 MG/1
125 TABLET ORAL
Qty: 30 TABLET | Refills: 3 | Status: SHIPPED | OUTPATIENT
Start: 2022-03-13 | End: 2022-06-12

## 2022-03-12 RX ORDER — CALCIUM CARBONATE 200(500)MG
2 TABLET,CHEWABLE ORAL 4 TIMES DAILY
Qty: 240 TABLET | Refills: 2 | Status: SHIPPED | OUTPATIENT
Start: 2022-03-12 | End: 2022-06-09

## 2022-03-12 RX ORDER — HYDROCODONE BITARTRATE AND ACETAMINOPHEN 5; 325 MG/1; MG/1
1 TABLET ORAL EVERY 4 HOURS PRN
Qty: 10 TABLET | Refills: 0 | Status: SHIPPED | OUTPATIENT
Start: 2022-03-12 | End: 2022-03-12

## 2022-03-12 RX ORDER — CALCITRIOL 0.25 UG/1
0.5 CAPSULE, LIQUID FILLED ORAL EVERY 12 HOURS SCHEDULED
Status: DISCONTINUED | OUTPATIENT
Start: 2022-03-12 | End: 2022-03-12 | Stop reason: HOSPADM

## 2022-03-12 RX ORDER — CALCIUM CARBONATE 200(500)MG
2 TABLET,CHEWABLE ORAL 4 TIMES DAILY
Status: DISCONTINUED | OUTPATIENT
Start: 2022-03-12 | End: 2022-03-12 | Stop reason: HOSPADM

## 2022-03-12 RX ORDER — PSEUDOEPHEDRINE HCL 30 MG
100 TABLET ORAL 2 TIMES DAILY PRN
Qty: 30 CAPSULE | Refills: 0 | Status: SHIPPED | OUTPATIENT
Start: 2022-03-12 | End: 2022-08-02 | Stop reason: SDDI

## 2022-03-12 RX ADMIN — LEVOTHYROXINE SODIUM 125 MCG: 0.12 TABLET ORAL at 06:24

## 2022-03-12 RX ADMIN — CALCITRIOL CAPSULES 0.25 MCG 0.5 MCG: 0.25 CAPSULE ORAL at 11:22

## 2022-03-12 RX ADMIN — ATENOLOL 50 MG: 50 TABLET ORAL at 08:44

## 2022-03-12 RX ADMIN — FAMOTIDINE 20 MG: 20 TABLET, FILM COATED ORAL at 08:44

## 2022-03-12 RX ADMIN — HYDROCODONE BITARTRATE AND ACETAMINOPHEN 1 TABLET: 5; 325 TABLET ORAL at 08:45

## 2022-03-12 RX ADMIN — ACETAMINOPHEN 1000 MG: 500 TABLET ORAL at 11:34

## 2022-03-12 RX ADMIN — ANTACID TABLETS 2 TABLET: 500 TABLET, CHEWABLE ORAL at 11:22

## 2022-03-12 RX ADMIN — ANTACID TABLETS 2 TABLET: 500 TABLET, CHEWABLE ORAL at 08:44

## 2022-03-12 NOTE — PLAN OF CARE
Problem: Adult Inpatient Plan of Care  Goal: Plan of Care Review  Flowsheets (Taken 3/12/2022 0538)  Progress: no change  Plan of Care Reviewed With: patient  Outcome Evaluation: VSS. Adequate I&O. c/o numbness and tingling  in hands.  Around shift change the n/t moved up to the forearms and began in the legs.  2 tums given. Calcium level drawn.  Around 2100 pt c/o that the n/t moved up the arms and about to the shoulders and in the legs.  c/o of muscle spasms in arms with fingers kandis toward the palms and discomfort.  Calcium level resulted at 9.1 Norco for pain x1. Rested well. Scant drainage on dressing. Anticipate DC in am.     
Goal Outcome Evaluation:           Progress: improving  Outcome Evaluation: Pt ambulating with stand by assist. Minimal pain. No N/V. Tolerating PO. Ice pack to neck incision. scuds on. Family at bedside.  
Goal Outcome Evaluation:  Plan of Care Reviewed With: patient        Progress: improving  Outcome Evaluation: VSS on RA. Surgical incision with minimal dried serosang drainage. Pain controlled. Patient to be discharged today. Tolerating diet.  
Yes

## 2022-03-12 NOTE — PROGRESS NOTES
"Patient Name:  Becky Naqvi  YOB: 1990  [id number]    Surgery Progress Note    Date of visit: 3/12/2022    Subjective   Patient with symptomatic hypocalemia last night, improved with extra TUMS. No tingling in fingers/toes/lips today. No nausea/vomiting. No fevers/chills. Minimal pain.         Objective       /74 (BP Location: Right arm, Patient Position: Lying)   Pulse 102   Temp 98 °F (36.7 °C) (Oral)   Resp 18   Ht 165.1 cm (65\")   Wt 79.5 kg (175 lb 4.3 oz)   SpO2 97%   BMI 29.17 kg/m²     Intake/Output Summary (Last 24 hours) at 3/12/2022 1112  Last data filed at 3/12/2022 0848  Gross per 24 hour   Intake 1000 ml   Output 3710 ml   Net -2710 ml       CV:  Rhythm regular and rate regular  L:  Clear to auscultation bilaterally  Abd:  Bowel sounds positive, soft  Ext:  No cyanosis, clubbing, edema  HEENT: neck flat, dressing c/d/i    Recent labs and imaging that are back at this time have been reviewed.   Ca 9.1 -> 8.3        Assessment/Plan     Pt POD#1 total thyroidectomy  Pain control  Advance diet as tolerated, HLIV  Increase TUMS to QID, start calcitriol BID  OOB, IS, DVT prlx  DC planning        Bear Sharpe MD  3/12/2022  11:12 EST      "

## 2022-03-14 LAB
CYTO UR: NORMAL
LAB AP CASE REPORT: NORMAL
LAB AP CLINICAL INFORMATION: NORMAL
PATH REPORT.FINAL DX SPEC: NORMAL
PATH REPORT.GROSS SPEC: NORMAL

## 2022-03-14 NOTE — DISCHARGE SUMMARY
Patient Name:  Becky Naqvi  YOB: 1990  [id number]      Date of Discharge:  3/12/2022    Discharge Diagnosis:   Graves disease    Problem List:  Active Hospital Problems    Diagnosis  POA   • Graves disease [E05.00]  Yes      Resolved Hospital Problems   No resolved problems to display.       Presenting Problem/History of Present Illness  Graves disease [E05.00]    Hospital Course  Patient is a 32 y.o. female presented with Graves disease. On 3/11/2022 she underwent a total thyroidectomy. On the evening of 3/11 she had symptomatic hypocalcemia which improved with oral calcium supplementation. On the morning of 3/12 she was pain-free, was afebrile, without nausea or vomiting, voiding appropriately, without numbness/tingling in fingers/toes/lips, and ambulating appropriately. She was thus found to be safe for discharge to home.      Procedures Performed    Procedure(s):  TOTAL THYROIDECTOMY  -------------------       Consults:   Consults     No orders found from 2/10/2022 to 3/12/2022.          Pertinent Test Results: N/A    Condition on Discharge:  Pain controlled with oral pain medication, tolerating diet, ambulating appropriately    Vital Signs       Discharge Disposition  Home or Self Care    Discharge Medications     Discharge Medications      New Medications      Instructions Start Date   calcitriol 0.5 MCG capsule  Commonly known as: ROCALTROL   0.5 mcg, Oral, Every 12 Hours Scheduled      calcium carbonate 500 MG chewable tablet  Commonly known as: TUMS   2 tablets, Oral, 4 Times Daily      docusate sodium 100 MG capsule   100 mg, Oral, 2 Times Daily PRN      levothyroxine 125 MCG tablet  Commonly known as: SYNTHROID, LEVOTHROID   125 mcg, Oral, Every Early Morning         Continue These Medications      Instructions Start Date   atenolol 50 MG tablet  Commonly known as: TENORMIN   TAKE 1 TABLET BY MOUTH EVERY DAY      CBD oral oil  Commonly known as: cannabidiol   Oral, STOPPED 3/1/22       HYDROXYZINE HCL PO   1 tablet, Oral, Daily PRN             Discharge Diet       Activity at Discharge  Activity Instructions     Discharge Activity      1) No driving until no longer taking narcotics.   2) Return to school / work in 1 week.  3) May shower starting 3/13. No tub baths. No swimming.   4) Do not lift / push / pull more than 15 lbs.  5) If develop numbness/tingling in fingers/toes/lips, take two additional TUMS. If symptoms do not improve in one hour, take two more and call Dr. Shapre's office.          Follow-up Appointments  Future Appointments   Date Time Provider Department Center   5/31/2022 11:00 AM Emma Carney MD [facility]   6/9/2022  1:00 PM Marni Pan MD [facility]     Additional Instructions for the Follow-ups that You Need to Schedule     Discharge Follow-up with Specified Provider: Dr. Sharpe's office; 2 Weeks   As directed      To: Dr. Sharpe's office    Follow Up: 2 Weeks    Follow Up Details: Call [phone] to make an appointment         Notify Physician or Go To The ED For the Following Conditions   As directed      Fever >100.4, increased pain, rapid neck swelling, new redness/drainage from incision, numbness/tingling in fingers/toes/lips not controlled by extra TUMS    Order Comments: Fever >100.4, increased pain, rapid neck swelling, new redness/drainage from incision, numbness/tingling in fingers/toes/lips not controlled by extra TUMS                Test Results Pending at Discharge  Pending Labs     Order Current Status    Tissue Pathology Exam In process          Time: Discharge 15 min    Bear Sharpe MD   03/14/22   15:11 EDT

## 2022-03-24 ENCOUNTER — TRANSCRIBE ORDERS (OUTPATIENT)
Dept: LAB | Facility: HOSPITAL | Age: 32
End: 2022-03-24

## 2022-03-24 ENCOUNTER — LAB (OUTPATIENT)
Dept: LAB | Facility: HOSPITAL | Age: 32
End: 2022-03-24

## 2022-03-24 DIAGNOSIS — E05.00 BASEDOW'S DISEASE: ICD-10-CM

## 2022-03-24 DIAGNOSIS — E05.00 BASEDOW'S DISEASE: Primary | ICD-10-CM

## 2022-03-24 LAB
CALCIUM SPEC-SCNC: 10 MG/DL (ref 8.6–10.5)
PTH-INTACT SERPL-MCNC: 14.3 PG/ML (ref 15–65)
T4 FREE SERPL-MCNC: 1.46 NG/DL (ref 0.93–1.7)
TSH SERPL DL<=0.05 MIU/L-ACNC: 0.01 UIU/ML (ref 0.27–4.2)

## 2022-03-24 PROCEDURE — 82310 ASSAY OF CALCIUM: CPT

## 2022-03-24 PROCEDURE — 84439 ASSAY OF FREE THYROXINE: CPT

## 2022-03-24 PROCEDURE — 36415 COLL VENOUS BLD VENIPUNCTURE: CPT

## 2022-03-24 PROCEDURE — 83970 ASSAY OF PARATHORMONE: CPT

## 2022-03-24 PROCEDURE — 84443 ASSAY THYROID STIM HORMONE: CPT

## 2022-04-20 RX ORDER — METHIMAZOLE 5 MG/1
TABLET ORAL
Qty: 270 TABLET | Refills: 1 | Status: SHIPPED | OUTPATIENT
Start: 2022-04-20 | End: 2022-06-09

## 2022-04-20 NOTE — TELEPHONE ENCOUNTER
States RX DC  Last OV 21 Future appt 22  Copied from plan from last OV     Diagnoses and all orders for this visit:     1. Hyperthyroidism (Primary)  Assessment & Plan:  Check cmp and cbc  tfts  Taking 15 mg methimazole  Considering pregnancy and we discussed risk related to thyrotoxicosis, storm  Placental transfer of TSI and  hyperthyroidism, goiter discussed  Consideration toward thyroidectomy and expected postoperative course reviewed  Will check tsi today and discuss further

## 2022-05-31 ENCOUNTER — OFFICE VISIT (OUTPATIENT)
Dept: OBSTETRICS AND GYNECOLOGY | Facility: CLINIC | Age: 32
End: 2022-05-31

## 2022-05-31 VITALS — SYSTOLIC BLOOD PRESSURE: 100 MMHG | WEIGHT: 179 LBS | DIASTOLIC BLOOD PRESSURE: 72 MMHG | BODY MASS INDEX: 29.79 KG/M2

## 2022-05-31 DIAGNOSIS — Z31.69 PRE-CONCEPTION COUNSELING: ICD-10-CM

## 2022-05-31 DIAGNOSIS — Z01.419 WOMEN'S ANNUAL ROUTINE GYNECOLOGICAL EXAMINATION: ICD-10-CM

## 2022-05-31 DIAGNOSIS — N92.0 MENORRHAGIA WITH REGULAR CYCLE: ICD-10-CM

## 2022-05-31 DIAGNOSIS — Z12.39 ENCOUNTER FOR BREAST CANCER SCREENING USING NON-MAMMOGRAM MODALITY: ICD-10-CM

## 2022-05-31 DIAGNOSIS — Z01.419 PAP TEST, AS PART OF ROUTINE GYNECOLOGICAL EXAMINATION: Primary | ICD-10-CM

## 2022-05-31 DIAGNOSIS — E03.9 HYPOTHYROIDISM (ACQUIRED): ICD-10-CM

## 2022-05-31 PROCEDURE — 99395 PREV VISIT EST AGE 18-39: CPT | Performed by: OBSTETRICS & GYNECOLOGY

## 2022-05-31 PROCEDURE — 99213 OFFICE O/P EST LOW 20 MIN: CPT | Performed by: OBSTETRICS & GYNECOLOGY

## 2022-05-31 RX ORDER — CETIRIZINE HYDROCHLORIDE 10 MG/1
10 TABLET ORAL DAILY
COMMUNITY

## 2022-05-31 RX ORDER — TRANEXAMIC ACID 650 MG/1
TABLET ORAL
Qty: 30 TABLET | Refills: 12 | Status: SHIPPED | OUTPATIENT
Start: 2022-05-31 | End: 2022-08-02

## 2022-05-31 NOTE — PROGRESS NOTES
GYN Annual Exam     CC - Here for annual exam.     Subjective   HPI  Becky Naqvi is a 32 y.o. female, , who presents for annual well woman exam. Patient's last menstrual period was 05/10/2022..  Periods are regular every 25-35 days, lasting 5 days. The flow is excessive, reports saturating pad/tampon every hour during first 1-2 days of flow.  Dysmenorrhea:moderate, occurring premenstrually and first 1-2 days of flow.  Patient reports problems with: none.  Partner Status: Marital Status: .  New Partners since last visit: no.  Desires STD Screening: no.  Patient has not had the HPV vaccine.     Additional OB/GYN History     Current contraception: contraceptive methods: Condoms  Desires to: do not start contraception  Last Pap : 2020, negative, last HPV testing in   Last Completed Pap Smear          Ordered - PAP SMEAR (Every 3 Years) Ordered on 2020  Done - negative; last HPV regardless 2019              History of abnormal Pap smear: no  Family history of uterine, colon, breast, or ovarian cancer: no  Previous Mammogram :  no  Performs monthly Self-Breast Exam: no  Exercises Regularly:yes  Feelings of Anxiety or Depression: yes - anxiety- controlled   Tobacco Usage?: No   OB History        2    Para   2    Term   2            AB        Living   2       SAB        IAB        Ectopic        Molar        Multiple   0    Live Births   2                Health Maintenance   Topic Date Due   • ANNUAL PHYSICAL  Never done   • TDAP/TD VACCINES (1 - Tdap) Never done   • HEPATITIS C SCREENING  Never done   • COVID-19 Vaccine (2 - Moderna series) 2021   • Annual Gynecologic Pelvic and Breast Exam  2022   • INFLUENZA VACCINE  2022   • PAP SMEAR  2023   • Pneumococcal Vaccine 0-64  Aged Out     Past Surgical History:   Procedure Laterality Date   •  SECTION      2016   •  SECTION N/A 2018    Procedure:   SECTION REPEAT * 40 WKS *;  Surgeon: Becky Lobo MD;  Location: [facility] LABOR DELIVERY;  Service: Obstetrics/Gynecology   • COLONOSCOPY     • KNEE ACL RECONSTRUCTION     • KNEE ARTHROSCOPY W/ MENISCAL REPAIR     • THYROIDECTOMY N/A 3/11/2022    Procedure: TOTAL THYROIDECTOMY;  Surgeon: Bear Sharpe MD;  Location: [facility] OR;  Service: General;  Laterality: N/A;   • WISDOM TOOTH EXTRACTION             The additional following portions of the patient's history were reviewed and updated as appropriate: allergies, current medications, past family history, past medical history, past social history and past surgical history.    Review of Systems   Constitutional: Negative.    HENT: Negative.    Eyes: Negative.    Respiratory: Negative.    Cardiovascular: Negative.    Gastrointestinal: Negative.    Endocrine: Negative.    Genitourinary: Negative.    Musculoskeletal: Negative.    Skin: Negative.    Allergic/Immunologic: Negative.    Neurological: Negative.    Hematological: Negative.    Psychiatric/Behavioral: Negative.        I have reviewed and agree with the HPI, ROS, and historical information as entered above. Emma Carney MD    Objective   /72   Wt 81.2 kg (179 lb)   LMP 05/10/2022   BMI 29.79 kg/m²     Physical Exam  Vitals and nursing note reviewed. Exam conducted with a chaperone present.   Constitutional:       Appearance: She is well-developed.   HENT:      Head: Normocephalic and atraumatic.   Neck:      Thyroid: No thyroid mass or thyromegaly.   Cardiovascular:      Rate and Rhythm: Normal rate and regular rhythm.      Heart sounds: No murmur heard.  Pulmonary:      Effort: Pulmonary effort is normal. No retractions.      Breath sounds: Normal breath sounds. No wheezing, rhonchi or rales.   Chest:      Chest wall: No mass or tenderness.   Breasts:      Right: Normal. No mass, nipple discharge, skin change or tenderness.      Left: Normal. No mass, nipple discharge, skin change or tenderness.        Abdominal:      General: Bowel sounds are normal.      Palpations: Abdomen is soft. Abdomen is not rigid. There is no mass.      Tenderness: There is no abdominal tenderness. There is no guarding.      Hernia: No hernia is present. There is no hernia in the left inguinal area.   Genitourinary:     Labia:         Right: No rash, tenderness or lesion.         Left: No rash, tenderness or lesion.       Vagina: Normal. No vaginal discharge or lesions.      Cervix: No cervical motion tenderness, discharge, lesion or cervical bleeding.      Uterus: Normal. Not enlarged, not fixed and not tender.       Adnexa:         Right: No mass or tenderness.          Left: No mass or tenderness.        Rectum: No external hemorrhoid.   Musculoskeletal:      Cervical back: Normal range of motion. No muscular tenderness.   Neurological:      Mental Status: She is alert and oriented to person, place, and time.   Psychiatric:         Behavior: Behavior normal.         Assessment & Plan       Encounter Diagnoses   Name Primary?   • Pap test, as part of routine gynecological examination Yes   • Women's annual routine gynecological examination    • Encounter for breast cancer screening using non-mammogram modality    • Menorrhagia with regular cycle    • Hypothyroidism (acquired)    • Pre-conception counseling        Plan     1. Recommended use of Vitamin D replacement and getting adequate calcium in her diet. (1500mg)  2. Reviewed monthly self breast exams.  Instructed to call with lumps, pain, or breast discharge.    3. Reviewed HPV guidelines.  4. Reviewed exercise as a preventative health measures.   5. Menorrhagia - Will return for U/S.  Discussed options including NSAID therapy, hormonal interventions, and surgical options.  Labs today  6. Once Thyroid levels stable and after u/s for menorrhagia she can try to conceive from my standpoint - encouraged to take folic acid.  7. Return in about 2 weeks (around 6/14/2022) for US with  Next Visit.       Emma Carney MD  05/31/2022

## 2022-06-03 LAB — REF LAB TEST METHOD: NORMAL

## 2022-06-09 ENCOUNTER — OFFICE VISIT (OUTPATIENT)
Dept: ENDOCRINOLOGY | Facility: CLINIC | Age: 32
End: 2022-06-09

## 2022-06-09 ENCOUNTER — LAB (OUTPATIENT)
Dept: LAB | Facility: HOSPITAL | Age: 32
End: 2022-06-09

## 2022-06-09 VITALS
SYSTOLIC BLOOD PRESSURE: 104 MMHG | DIASTOLIC BLOOD PRESSURE: 64 MMHG | HEIGHT: 65 IN | HEART RATE: 59 BPM | WEIGHT: 181 LBS | BODY MASS INDEX: 30.16 KG/M2 | OXYGEN SATURATION: 99 %

## 2022-06-09 DIAGNOSIS — R53.83 FATIGUE, UNSPECIFIED TYPE: ICD-10-CM

## 2022-06-09 DIAGNOSIS — E89.0 POSTSURGICAL HYPOTHYROIDISM: Primary | ICD-10-CM

## 2022-06-09 DIAGNOSIS — R25.2 MUSCLE CRAMPS: ICD-10-CM

## 2022-06-09 LAB
25(OH)D3 SERPL-MCNC: 25.9 NG/ML (ref 30–100)
ALBUMIN SERPL-MCNC: 5 G/DL (ref 3.5–5.2)
ALBUMIN/GLOB SERPL: 2.1 G/DL
ALP SERPL-CCNC: 54 U/L (ref 39–117)
ALT SERPL W P-5'-P-CCNC: 17 U/L (ref 1–33)
ANION GAP SERPL CALCULATED.3IONS-SCNC: 11 MMOL/L (ref 5–15)
AST SERPL-CCNC: 21 U/L (ref 1–32)
BILIRUB SERPL-MCNC: 0.4 MG/DL (ref 0–1.2)
BUN SERPL-MCNC: 10 MG/DL (ref 6–20)
BUN/CREAT SERPL: 14.1 (ref 7–25)
CA-I BLD-MCNC: 5.2 MG/DL (ref 4.6–5.4)
CA-I SERPL ISE-MCNC: 1.31 MMOL/L (ref 1.15–1.35)
CALCIUM SPEC-SCNC: 9.2 MG/DL (ref 8.6–10.5)
CHLORIDE SERPL-SCNC: 103 MMOL/L (ref 98–107)
CO2 SERPL-SCNC: 22 MMOL/L (ref 22–29)
CREAT SERPL-MCNC: 0.71 MG/DL (ref 0.57–1)
EGFRCR SERPLBLD CKD-EPI 2021: 115.3 ML/MIN/1.73
GLOBULIN UR ELPH-MCNC: 2.4 GM/DL
GLUCOSE SERPL-MCNC: 82 MG/DL (ref 65–99)
POTASSIUM SERPL-SCNC: 4.3 MMOL/L (ref 3.5–5.2)
PROT SERPL-MCNC: 7.4 G/DL (ref 6–8.5)
PTH-INTACT SERPL-MCNC: 28.2 PG/ML (ref 15–65)
SODIUM SERPL-SCNC: 136 MMOL/L (ref 136–145)
T4 FREE SERPL-MCNC: 1.64 NG/DL (ref 0.93–1.7)
TSH SERPL DL<=0.05 MIU/L-ACNC: 0.27 UIU/ML (ref 0.27–4.2)

## 2022-06-09 PROCEDURE — 83970 ASSAY OF PARATHORMONE: CPT | Performed by: INTERNAL MEDICINE

## 2022-06-09 PROCEDURE — 82306 VITAMIN D 25 HYDROXY: CPT | Performed by: INTERNAL MEDICINE

## 2022-06-09 PROCEDURE — 82330 ASSAY OF CALCIUM: CPT | Performed by: INTERNAL MEDICINE

## 2022-06-09 PROCEDURE — 84443 ASSAY THYROID STIM HORMONE: CPT | Performed by: INTERNAL MEDICINE

## 2022-06-09 PROCEDURE — 99214 OFFICE O/P EST MOD 30 MIN: CPT | Performed by: INTERNAL MEDICINE

## 2022-06-09 PROCEDURE — 84445 ASSAY OF TSI GLOBULIN: CPT | Performed by: INTERNAL MEDICINE

## 2022-06-09 PROCEDURE — 80053 COMPREHEN METABOLIC PANEL: CPT | Performed by: INTERNAL MEDICINE

## 2022-06-09 PROCEDURE — 84439 ASSAY OF FREE THYROXINE: CPT | Performed by: INTERNAL MEDICINE

## 2022-06-09 RX ORDER — ATENOLOL 50 MG/1
25 TABLET ORAL DAILY
Qty: 15 TABLET | Refills: 1 | Status: SHIPPED | OUTPATIENT
Start: 2022-06-09 | End: 2022-08-02

## 2022-06-09 NOTE — PROGRESS NOTES
Becky Naqvi 32 y.o.  CC: Graves' Disease and Hyperthyroidism      [place]: Graves' Disease and Hyperthyroidism    Now post op  Feels exhausted - worse with exertion  bp is good  Did well initially   Is still taking atenolol 50 mg daily     Allergies   Allergen Reactions   • Oxycodone-Acetaminophen Itching   • Propylthiouracil Other (See Comments)     Neutropenia       Current Outpatient Medications:   •  atenolol (TENORMIN) 50 MG tablet, Take 0.5 tablets by mouth Daily., Disp: 15 tablet, Rfl: 1  •  cetirizine (zyrTEC) 10 MG tablet, Take 10 mg by mouth Daily., Disp: , Rfl:   •  HYDROXYZINE HCL PO, Take 1 tablet by mouth Daily As Needed (ANXIETY)., Disp: , Rfl:   •  levothyroxine (SYNTHROID, LEVOTHROID) 125 MCG tablet, Take 1 tablet by mouth Every Morning., Disp: 30 tablet, Rfl: 3  •  docusate sodium 100 MG capsule, Take 1 capsule by mouth 2 (Two) Times a Day As Needed for Constipation., Disp: 30 capsule, Rfl: 0  •  HYDROcodone-acetaminophen (Norco) 5-325 MG per tablet, Take 1 tablet by mouth Every 6 (Six) Hours As Needed for Moderate Pain ., Disp: 12 tablet, Rfl: 0  •  Tranexamic Acid (Lysteda) 650 MG tablet, 2 tablets by mouth 3 times daily for 5 days, Disp: 30 tablet, Rfl: 12  Patient Active Problem List    Diagnosis    • Postsurgical hypothyroidism [E89.0]    • Muscle cramps [R25.2]    • Fatigue [R53.83]    • Graves disease [E05.00]    • Tremor [R25.1]    • Abnormal imaging of thyroid [R93.89]    • Delivered by  section [Z38.01]    • Anemia [D64.9]    • Ganglion of hand [M67.449]    • Gastroesophageal reflux disease [K21.9]    • 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 Negative for activity change, appetite change and unexpected weight change.   HENT: Negative for congestion and  "rhinorrhea.    Eyes: Negative for visual disturbance.   Respiratory: Negative for cough and shortness of breath.    Cardiovascular: Negative for palpitations and leg swelling.   Gastrointestinal: Negative for constipation, diarrhea and nausea.   Genitourinary: Negative for hematuria.   Musculoskeletal: Negative for arthralgias, back pain, gait problem, joint swelling and myalgias.   Skin: Negative for color change, rash and wound.   Allergic/Immunologic: Negative for environmental allergies, food allergies and immunocompromised state.   Neurological: Negative for dizziness, weakness and light-headedness.   Psychiatric/Behavioral: Negative for confusion, decreased concentration, dysphoric mood and sleep disturbance. The patient is not nervous/anxious.      Social History     Socioeconomic History   • Marital status:    Tobacco Use   • Smoking status: Never Smoker   • Smokeless tobacco: Never Used   Vaping Use   • Vaping Use: Never used   Substance and Sexual Activity   • Alcohol use: Yes     Comment: social   • Drug use: Never   • Sexual activity: Yes     Partners: Male     Birth control/protection: Condom     Family History   Problem Relation Age of Onset   • Hypertension Father    • Arthritis Father    • Hyperlipidemia Father    • Graves' disease Mother    • Arthritis Paternal Grandfather    • Lung cancer Paternal Grandmother    • Arthritis Paternal Grandmother    • Heart disease Paternal Grandmother    • COPD Paternal Grandmother    • Heart attack Maternal Grandmother    • Arthritis Maternal Grandmother    • Diabetes Maternal Grandmother    • Arthritis Maternal Grandfather    • Lung cancer Paternal Uncle    • Breast cancer Neg Hx    • Ovarian cancer Neg Hx    • Uterine cancer Neg Hx    • Colon cancer Neg Hx      /64 (BP Location: Left arm, Patient Position: Sitting, Cuff Size: Adult)   Pulse 59   Ht 165.1 cm (65\")   Wt 82.1 kg (181 lb)   LMP 05/10/2022   SpO2 99%   BMI 30.12 kg/m²   Physical " Exam  Vitals and nursing note reviewed.   Constitutional:       Appearance: Normal appearance. She is well-developed.   HENT:      Head: Normocephalic and atraumatic.   Eyes:      General: Lids are normal.      Extraocular Movements: Extraocular movements intact.      Conjunctiva/sclera: Conjunctivae normal.      Pupils: Pupils are equal, round, and reactive to light.   Neck:      Thyroid: No thyroid mass or thyromegaly.      Vascular: No carotid bruit.      Trachea: Trachea normal. No tracheal deviation.      Comments: Well healed scar   Cardiovascular:      Rate and Rhythm: Normal rate and regular rhythm.      Pulses: Normal pulses.      Heart sounds: Normal heart sounds. No murmur heard.    No friction rub. No gallop.   Pulmonary:      Effort: Pulmonary effort is normal. No respiratory distress.      Breath sounds: Normal breath sounds. No wheezing.   Musculoskeletal:         General: No deformity. Normal range of motion.      Cervical back: Normal range of motion and neck supple.   Lymphadenopathy:      Cervical: No cervical adenopathy.   Skin:     General: Skin is warm and dry.      Findings: No erythema or rash.      Nails: There is no clubbing.   Neurological:      Mental Status: She is alert and oriented to person, place, and time.      Cranial Nerves: No cranial nerve deficit.      Deep Tendon Reflexes: Reflexes are normal and symmetric. Reflexes normal.   Psychiatric:         Speech: Speech normal.         Behavior: Behavior normal.         Thought Content: Thought content normal.         Judgment: Judgment normal.       Results for orders placed or performed in visit on 05/31/22   LIQUID-BASED PAP SMEAR, [facility] ([facility])    Specimen: ThinPrep Vial   Result Value Ref Range    Reference Lab Report       [facility]  [address] [place] [address]  Phone: [phone] or [phone]  Fax: [phone]  Jovani Burns M.D., Medical Director    PATIENT NAME                                      LABORATORY NO.  [id number]   PRIMO EASLEY.                              [id number]  [id number]                                 AGE                    SEX   SSN              CLIENT REF #  [id number]                                 32        1990      F     [id number]      [id number]    [id number]                 REQUESTING MSUE.           ATTENDING [place] [address]                        TRICIA SCOTT  DATE COLLECTED            DATE RECEIVED          DATE REPORTED  2022    ThinPrep Pap with [facility]    DIAGNOSIS:  Negative for intraepithelial lesion or malignancy    Multiple factors can influence accuracy of Pap tests; therefore, screening at  regular  intervals is necessary for early cancer detection.      SPECIMEN ADEQUACY:  SATISFACTORY FOR EVALUATION  Transformation zone is present.  SOURCE OF SPECIMEN:       CERVICAL  SLIDES:  1  CLINICAL HISTORY:  Pap test, as part of routine gynecological examination    HPV  HR-HPV POOL: Negative    The Aptima HPV assay is an in vitro nucleic acid amplification test for the  qualitative detection of E6/E7 viral messenger RNA from 14 high risk types of  HPV in cervical specimens. The high risk HPV types detected include: 16, 18,  31, 33, 35, 39, 45, 51, 52, 56, 58, 59, 66, 68    CYTOTECHNOLOGIST:             JOHNIE GUZMAN (ASCP)    CPT CODES:  65077, 21247       Diagnoses and all orders for this visit:    1. Postsurgical hypothyroidism (Primary)  Assessment & Plan:  Check tfts - c/o fatigue     Orders:  -     T4, Free  -     TSH  -     Thyroid Stimulating Immunoglobulin    2. Muscle cramps  Assessment & Plan:  Check cmp and ionized calcium / pth     Orders:  -     Comprehensive Metabolic Panel  -     Vitamin D 25 Hydroxy  -     Calcium, Ionized  -     PTH, Intact    3. Fatigue, unspecified type  Assessment & Plan:  Check tfts  Wean atenolol to off        Other orders  -     atenolol (TENORMIN) 50 MG tablet; Take 0.5 tablets by mouth Daily.  Dispense: 15 tablet; Refill: 1  Return in about 6 months (around 12/9/2022) for Recheck.    Marni Pan MD  Signed Marni Pan MD

## 2022-06-12 LAB — TSI SER-ACNC: 9.02 IU/L (ref 0–0.55)

## 2022-06-12 RX ORDER — LEVOTHYROXINE SODIUM 112 UG/1
112 TABLET ORAL
Qty: 90 TABLET | Refills: 1 | Status: SHIPPED | OUTPATIENT
Start: 2022-06-12 | End: 2022-10-17

## 2022-08-02 ENCOUNTER — OFFICE VISIT (OUTPATIENT)
Dept: OBSTETRICS AND GYNECOLOGY | Facility: CLINIC | Age: 32
End: 2022-08-02

## 2022-08-02 VITALS
HEIGHT: 65 IN | BODY MASS INDEX: 29.89 KG/M2 | DIASTOLIC BLOOD PRESSURE: 72 MMHG | WEIGHT: 179.4 LBS | SYSTOLIC BLOOD PRESSURE: 102 MMHG

## 2022-08-02 DIAGNOSIS — N92.0 MENORRHAGIA WITH REGULAR CYCLE: Primary | ICD-10-CM

## 2022-08-02 PROCEDURE — 99214 OFFICE O/P EST MOD 30 MIN: CPT | Performed by: OBSTETRICS & GYNECOLOGY

## 2022-08-02 RX ORDER — TRANEXAMIC ACID 650 MG/1
TABLET ORAL
Qty: 30 TABLET | Refills: 12 | Status: SHIPPED | OUTPATIENT
Start: 2022-08-02 | End: 2022-09-04

## 2022-08-02 NOTE — PROGRESS NOTES
Chief Complaint   Patient presents with   • Follow-up     USG/Menorrhagia         Subjective   HPI  Becky Naqvi is a 32 y.o. female, , Patient's last menstrual period was 2022.. She presents for initial evaluation of menorrhagia with regular cycles. She saturates 1-2 tampon(s)/pad(s) every 1 hour for 2 days. Patient states that after the two days of heavy bleeding her period slows down and becomes normal. The menstrual problem began 3-4 years ago. Prior to today's visit, the patient has been evaluated for menorrhagia at her most recent annual exam:  U/S today. In the past the patient has tried birth control pills/Nuvaring for treatment with success. Patient has not been on birth control for about 10 years and does not want to start  Back on birth control. The patient reports additional symptoms as none.      US was done today.     Thromboembolic Disease: none  History of hypertension: no  History of migraines: no  Tobacco Usage?: No     Additional OB/GYN History   Last Pap :   Last Completed Pap Smear          PAP SMEAR (Every 3 Years) Next due on 2022  LIQUID-BASED PAP SMEAR, [facility] ([facility])    2020  Done - negative; last HPV regardless 2019                  Current Outpatient Medications:   •  cetirizine (zyrTEC) 10 MG tablet, Take 10 mg by mouth Daily., Disp: , Rfl:   •  levothyroxine (SYNTHROID, LEVOTHROID) 112 MCG tablet, Take 1 tablet by mouth Every Morning., Disp: 90 tablet, Rfl: 1  •  Tranexamic Acid (Lysteda) 650 MG tablet, 2 tablets by mouth 3 times daily for 5 days, Disp: 30 tablet, Rfl: 12     Past Medical History:   Diagnosis Date   • Anemia     In the past   • Anxiety    • Genital warts    • GERD (gastroesophageal reflux disease)    • Graves disease    • Umbilical hernia 10/2019   • Wears glasses         Past Surgical History:   Procedure Laterality Date   •  SECTION      2016   •  SECTION N/A 2018    Procedure:  " SECTION REPEAT * 40 WKS *;  Surgeon: Becky Lobo MD;  Location: [facility] LABOR DELIVERY;  Service: Obstetrics/Gynecology   • COLONOSCOPY     • KNEE ACL RECONSTRUCTION     • KNEE ARTHROSCOPY W/ MENISCAL REPAIR     • THYROIDECTOMY N/A 3/11/2022    Procedure: TOTAL THYROIDECTOMY;  Surgeon: Bear Sharpe MD;  Location: [facility] OR;  Service: General;  Laterality: N/A;   • WISDOM TOOTH EXTRACTION           The additional following portions of the patient's history were reviewed and updated as appropriate: allergies, current medications, past family history, past medical history, past social history, past surgical history and problem list.    Review of Systems   Constitutional: Negative.    HENT: Negative.    Eyes: Negative.    Respiratory: Negative.    Cardiovascular: Negative.    Gastrointestinal: Negative.    Endocrine: Negative.    Genitourinary: Positive for menstrual problem.   Musculoskeletal: Negative.    Skin: Negative.    Allergic/Immunologic: Negative.    Neurological: Negative.    Hematological: Negative.    Psychiatric/Behavioral: Negative.        I have reviewed and agree with the HPI, ROS, and historical information as entered above. Emma Carney MD    Objective   /72   Ht 165.1 cm (65\")   Wt 81.4 kg (179 lb 6.4 oz)   LMP 2022   BMI 29.85 kg/m²     Physical Exam  Constitutional:       Appearance: She is well-developed.   HENT:      Head: Normocephalic.   Eyes:      Conjunctiva/sclera: Conjunctivae normal.   Pulmonary:      Effort: Pulmonary effort is normal.   Psychiatric:         Behavior: Behavior normal.         Assessment & Plan     Assessment     Problem List Items Addressed This Visit    None     Visit Diagnoses     Menorrhagia with regular cycle    -  Primary            Plan     1. Ultrasound reviewed and essentially normal.  Simple cyst on left ovary.  She is midcycle.  2. Discussed all options for management of her heavier periods.  She will occ have midcycle " spotting.  She is hoping to possible conceive which eliminates many of her options. She will give Lysteda a try and let me know if the IMB becomes unpredicable or she wants to consider hormonal options.  3.   Dysmenorrhea/HMB- discussed possibility of having endometriosis or adenomyosis.      Emma Carney MD  08/02/2022

## 2022-08-05 RX ORDER — ATENOLOL 50 MG/1
TABLET ORAL
Qty: 90 TABLET | Refills: 1 | Status: SHIPPED | OUTPATIENT
Start: 2022-08-05 | End: 2022-09-04

## 2022-08-05 NOTE — TELEPHONE ENCOUNTER
According to last OV, Pt still taking   Other orders  -     atenolol (TENORMIN) 50 MG tablet; Take 0.5 tablets by mouth Daily.  Dispense: 15 tablet; Refill: 1  Return in about 6 months (around 12/9/2022) for Recheck.     Marni Pan MD  Signed Marni Pan MD

## 2022-08-31 ENCOUNTER — TELEPHONE (OUTPATIENT)
Dept: ENDOCRINOLOGY | Facility: CLINIC | Age: 32
End: 2022-08-31

## 2022-08-31 DIAGNOSIS — E05.90 HYPERTHYROIDISM: Primary | ICD-10-CM

## 2022-08-31 NOTE — TELEPHONE ENCOUNTER
Patient called would like to have Labs drawn, checked her Chart and did not see any Open Order for Labs.   Please advise.  Thank you.

## 2022-09-04 ENCOUNTER — TELEMEDICINE (OUTPATIENT)
Dept: FAMILY MEDICINE CLINIC | Facility: TELEHEALTH | Age: 32
End: 2022-09-04

## 2022-09-04 DIAGNOSIS — R39.89 SUSPECTED UTI: Primary | ICD-10-CM

## 2022-09-04 PROCEDURE — 99213 OFFICE O/P EST LOW 20 MIN: CPT | Performed by: NURSE PRACTITIONER

## 2022-09-04 RX ORDER — NITROFURANTOIN 25; 75 MG/1; MG/1
100 CAPSULE ORAL 2 TIMES DAILY
Qty: 10 CAPSULE | Refills: 0 | Status: SHIPPED | OUTPATIENT
Start: 2022-09-04 | End: 2022-09-09

## 2022-09-04 NOTE — PATIENT INSTRUCTIONS
Wipe front to back, increase water to flush the kidneys and avoid bladder irritants such as caffeine and alcohol.    -Warning signs: severe abdominal/pelvic/back pain, fever >101, blood in urine - seek medical attention as soon as possible for a hands on/objective exam and possible labs.     If symptoms worsen or do not improve follow up with your PCP or visit your nearest Urgent Care Center or ER.

## 2022-09-04 NOTE — PROGRESS NOTES
Subjective   Chief Complaint   Patient presents with   • Urinary Tract Infection       Becky Naqvi is a 32 y.o. female.     Pt reports urinary frequency, urgency, dysuria, bladder pressure x 2 days. She has had UTI's in the past with similar symptoms.     Urinary Tract Infection   This is a new problem. Episode onset: 2 days. The problem has been unchanged. The quality of the pain is described as burning. There has been no fever. There is no history of pyelonephritis. Associated symptoms include frequency and urgency. Pertinent negatives include no chills, discharge, flank pain, hematuria, hesitancy, nausea, possible pregnancy, sweats or vomiting. Treatments tried: azo. The treatment provided no relief.        Allergies   Allergen Reactions   • Oxycodone-Acetaminophen Itching   • Propylthiouracil Other (See Comments)     Neutropenia       Past Medical History:   Diagnosis Date   • Anemia     In the past   • Anxiety    • Genital warts    • GERD (gastroesophageal reflux disease)    • Graves disease    • Umbilical hernia 10/2019   • Wears glasses        Past Surgical History:   Procedure Laterality Date   •  SECTION      2016   •  SECTION N/A 2018    Procedure:  SECTION REPEAT * 40 WKS *;  Surgeon: Becky Lobo MD;  Location:  [email or web address]IV[facility];  Service: Obstetrics/Gynecology   • COLONOSCOPY     • KNEE ACL RECONSTRUCTION     • KNEE ARTHROSCOPY W/ MENISCAL REPAIR     • THYROIDECTOMY N/A 3/11/2022    Procedure: TOTAL THYROIDECTOMY;  Surgeon: Bear Sharpe MD;  Location:  [facility];  Service: General;  Laterality: N/A;   • WISDOM TOOTH EXTRACTION         Social History     Socioeconomic History   • Marital status:    Tobacco Use   • Smoking status: Never Smoker   • Smokeless tobacco: Never Used   Vaping Use   • Vaping Use: Never used   Substance and Sexual Activity   • Alcohol use: Yes     Comment: social   • Drug use: Never   • Sexual activity: Yes     Partners:  Male     Birth control/protection: Condom       Family History   Problem Relation Age of Onset   • Hypertension Father    • Arthritis Father    • Hyperlipidemia Father    • Graves' disease Mother    • Arthritis Paternal Grandfather    • Lung cancer Paternal Grandmother    • Arthritis Paternal Grandmother    • Heart disease Paternal Grandmother    • COPD Paternal Grandmother    • Heart attack Maternal Grandmother    • Arthritis Maternal Grandmother    • Diabetes Maternal Grandmother    • Arthritis Maternal Grandfather    • Lung cancer Paternal Uncle    • Breast cancer Neg Hx    • Ovarian cancer Neg Hx    • Uterine cancer Neg Hx    • Colon cancer Neg Hx          Current Outpatient Medications:   •  cetirizine (zyrTEC) 10 MG tablet, Take 10 mg by mouth Daily., Disp: , Rfl:   •  levothyroxine (SYNTHROID, LEVOTHROID) 112 MCG tablet, Take 1 tablet by mouth Every Morning., Disp: 90 tablet, Rfl: 1  •  nitrofurantoin, macrocrystal-monohydrate, (Macrobid) 100 MG capsule, Take 1 capsule by mouth 2 (Two) Times a Day for 5 days., Disp: 10 capsule, Rfl: 0      Review of Systems   Constitutional: Negative for chills, diaphoresis, fatigue and fever.   Cardiovascular: Negative for chest pain and palpitations.   Gastrointestinal: Negative for abdominal distention, abdominal pain, nausea and vomiting.   Genitourinary: Positive for dysuria, frequency, pelvic pressure and urgency. Negative for decreased urine volume, flank pain, hematuria, hesitancy, urinary incontinence, vaginal bleeding, vaginal discharge and vaginal pain.   Musculoskeletal: Negative for back pain.        There were no vitals filed for this visit.    Objective   Physical Exam  Constitutional:       General: She is not in acute distress.     Appearance: Normal appearance. She is not ill-appearing, toxic-appearing or diaphoretic.   HENT:      Head: Normocephalic.      Mouth/Throat:      Lips: Pink.      Mouth: Mucous membranes are moist.   Pulmonary:      Effort:  Pulmonary effort is normal.   Abdominal:      Tenderness: There is no abdominal tenderness (per pt).   Neurological:      Mental Status: She is alert and oriented to person, place, and time.   Psychiatric:         Mood and Affect: Mood normal.         Behavior: Behavior normal.          Procedures     Assessment & Plan   Diagnoses and all orders for this visit:    1. Suspected UTI (Primary)  -     nitrofurantoin, macrocrystal-monohydrate, (Macrobid) 100 MG capsule; Take 1 capsule by mouth 2 (Two) Times a Day for 5 days.  Dispense: 10 capsule; Refill: 0      Wipe front to back, increase water to flush the kidneys and avoid bladder irritants such as caffeine and alcohol.    -Warning signs: severe abdominal/pelvic/back pain, fever >101, blood in urine - seek medical attention as soon as possible for a hands on/objective exam and possible labs.     If symptoms worsen or do not improve follow up with your PCP or visit your nearest Urgent Care Center or ER.      PLAN: Discussed dosing, side effects, recommended other symptomatic care.  Patient should follow up with primary care provider, Urgent Care or ER if symptoms worsen, fail to resolve or other symptoms need attention. Patient/family agree to the above.         JOAQUIN Nur     The use of a video visit has been reviewed with the patient and verbal informed consent has been obtained. Myself and Becky Naqvi participated in this visit. The patient is located at [address] [place] [address]. I am located in [place], [place]. Mychart and Zoom were utilized.        This visit was performed via Telehealth.  This patient has been instructed to follow-up with their primary care provider if their symptoms worsen or the treatment provided does not resolve their illness.

## 2022-10-10 ENCOUNTER — TELEPHONE (OUTPATIENT)
Dept: OBSTETRICS AND GYNECOLOGY | Facility: CLINIC | Age: 32
End: 2022-10-10

## 2022-10-10 NOTE — TELEPHONE ENCOUNTER
PATIENT STATE SHE WOULD LIKE A CALL BACK TO DISCUSS BLEEDING BETWEEN PERIODS. SEEMS TO BE HEAVIER THIS TIME.    CALL BACK [phone]

## 2022-10-10 NOTE — TELEPHONE ENCOUNTER
Dr. Carney pt.     S/w pt she states she has been having vaginal bleeding between her cycles that has been going on for several months. Patient states her LMP was 9/25/22 x 5 days and then she started again on 10/7/2022 and is still currently bleeding and is currently heavier than normal.     Patient denies: increased stress, saturating one pad < one hour, vaginal blood clots bigger than golf ball in size, recent COVID, increased activity level/ exercise    Patient states she is not currently on birth control, and did have COVID 6/2022.    Patient recently had U/S 8/2022 that she states was normal and patient requested to come into the office to discuss her issues and would like hormone levels checked.     appt has been scheduled . She v/u appt info.

## 2022-10-14 ENCOUNTER — TELEPHONE (OUTPATIENT)
Dept: ENDOCRINOLOGY | Facility: CLINIC | Age: 32
End: 2022-10-14

## 2022-10-14 NOTE — TELEPHONE ENCOUNTER
Patient called had labs at PCP her TSH came back high 21.7 She would like to know if she needs to increase her medication.

## 2022-10-14 NOTE — TELEPHONE ENCOUNTER
Pt was advised to ask PCP to fax a copy of the labs to our office.  She states she went in to be seen because her menstrual cycle has been abnormal and they did labs for thyroid as well.  She has been more tired and her skin is extremely dry  She is taking levothyroxine 112mcg daily

## 2022-10-17 RX ORDER — LEVOTHYROXINE SODIUM 137 UG/1
137 TABLET ORAL
Qty: 90 TABLET | Refills: 1 | Status: SHIPPED | OUTPATIENT
Start: 2022-10-17 | End: 2023-03-01

## 2022-10-18 NOTE — TELEPHONE ENCOUNTER
[facility]   Followup Note      HISTORY OF PRESENT ILLNESS:  Cate Mitchell is a 54 year old female who has been following for cervical and thoracic and low axial back which began several years ago.  The pain is located low axial back pain and cervical neck. On her last visit she had lumbar RFA which helped her over 70 percent she relates her lower back is much better. She does continue to have some pain over her right thoracic paraspinal region. The pain is described as constant and is dull to sharp in character.    No specific inciting event recalled. No recent injury/trauma. Pain is rated as a 4-9/10 usually and is rated as a 7/10 currently. Pt reports that the pain is worse with walking. The pain is better with rest and medication. The pain has been getting  worse steadily.  Pt relates some numbness/tingling/weakness.  There is no incontinence of bowel/bladder.  No saddle anesthesia. She would like some TPI today.     PAIN COURSE AND PREVIOUS INTERVENTIONS:  Medications:  Cymbalta 30mg,    Interventions: ESIs, RFA  Adjuvants:  Physical therapy     BLOOD THINNING MEDICATIONS:  None    CURRENT MEDICATIONS:  Current Outpatient Medications   Medication Sig Dispense Refill   • ranitidine (ZANTAC) 150 MG tablet Take 1 tablet by mouth 2 times daily. 180 tablet 3   • venlafaxine XR (EFFEXOR XR) 75 MG 24 hr capsule Take 1 capsule by mouth daily. 90 capsule 3   • zolpidem (AMBIEN) 10 MG tablet TAKE 1 TABLET BY MOUTH NIGHTLY AS NEEDED FOR SLEEP 90 tablet 1   • pantoprazole (PROTONIX) 40 MG tablet Take 1 tablet by mouth daily (before breakfast). 90 tablet 3   • metoPROLOL succinate (TOPROL-XL) 50 MG 24 hr tablet Take 1 tablet by mouth daily. 90 tablet 3   • celecoxib (CELEBREX) 200 MG capsule Take 1 capsule by mouth 2 times daily. 60 capsule 2   • baclofen (LIORESAL) 10 MG tablet Take half a tablet to 1 tablet by mouth as needed for muscle spasms. Max 3 tablets/day. 90 tablet 2   • tolterodine  Pt was advised with Dr Pan recommendations and she voiced understanding   (DETROL LA) 4 MG 24 hr capsule Take 1 capsule by mouth daily. 30 capsule 0   • levothyroxine (SYNTHROID, LEVOTHROID) 75 MCG tablet TAKE 1 TABLET BY MOUTH ONCE DAILY 30 tablet 11   • Cholecalciferol (VITAMIN D3) 5000 units Tab Take 1 tablet by mouth daily.     • ferrous sulfate 325 (65 FE) MG tablet Take 1 tablet by mouth daily (with breakfast). 30 tablet 6     No current facility-administered medications for this visit.        ALLERGIES:  ALLERGIES:  No Known Allergies    SURGICAL HISTORY:  Spine Surgery? - No  Past Surgical History:   Procedure Laterality Date   • Ankle fracture surgery  2009    [facility] R Ankle   • Colonoscopy  05/19/2015   • Ir epidural injection  2018    x 3.   • Knee arthroscopy w/ laser Right 1990    right knee arthroscopy   • Removal gallbladder  12/15/2015    Lap   • Total knee replacement Right 11/02/2017    [facility]   • Total knee replacement Left 08/28/2018    Dr Marie. [facility]   • Total knee replacement Right 11/2017    TKR with Dr. Hardin   • Tubal ligation     • Tympanic membrane repair Right     rupture   • Vaginal delivery      x2       REVIEW OF SYSTEMS:   Bowel/Bladder Incontinence: as above  Coughing/sneezing/straining does not exacerbate the pain.  Numbness/tingling: as above  Weakness: as above  Weight Loss: Negative   Fever: Negative   Cardiovascular:  No current chest pain or palpitations   Respiratory:  No current shortness of breath   Gastrointestinal:  No active ulcer  Genitourinary:  Negative  Integumentary :  Negative  Psychiatric:  Negative  Hematologic: No active bleeding  Lymphatic: No current lymphedema  Allergic/Immunologic:  Negative  Musculoskeletal: As above  Neurological: As above    MEDICAL HISTORY:  Past Medical History:   Diagnosis Date   • Acute ear infection 10/24/2017    right ear, currently on oral antibiotics   • Arthritis     DJD bilat knees   • Chronic low back pain 2/3/2016   • Chronic pain     low back pain   • Depression     49 yo  with CVA in  2018 and TIA recently   • Essential (primary) hypertension    • GERD (gastroesophageal reflux disease) 2/3/2016   • Hypothyroidism 2/3/2016   • ZOILA (iron deficiency anemia) 2/3/2016   • Impaired mobility and ADLs     uses walker to ambulate   • Left knee pain    • Morbid obesity with BMI of 40.0-44.9, adult (CMS/[facility]) 2016    was on Phentermine for weight loss until 3 weeks before right knee surgery   • NETTIE (obstructive sleep apnea) 2/3/2016    Doesn't tolerate CPAP    • PONV (postoperative nausea and vomiting)    • Right knee pain    • Staph infection 10/09/2017    nares culture + for staph, using Bactroban to nares   • Staph skin infection 2018    Nares + staph pre-op   • Wears glasses     for driving       FAMILY HISTORY:  Family History   Problem Relation Age of Onset   • Cancer, Breast Mother    • COPD Mother    • Diabetes Father    • Early death Sister         MVA   • Alcohol Abuse Brother    • Diabetes Son    • Arthritis Sister        SOCIAL HISTORY:  Social History     Socioeconomic History   • Marital status: /Civil Union     Spouse name: Not on file   • Number of children: 2   • Years of education: Not on file   • Highest education level: Not on file   Occupational History   • Occupation: Processes medical claims     Comment: [employer]   Social Needs   • Financial resource strain: Not on file   • Food insecurity:     Worry: Not on file     Inability: Not on file   • Transportation needs:     Medical: Not on file     Non-medical: Not on file   Tobacco Use   • Smoking status: Former Smoker     Packs/day: 0.25     Years: 20.00     Pack years: 5.00     Types: Cigarettes     Last attempt to quit: 2/3/2015     Years since quittin.8   • Smokeless tobacco: Never Used   • Tobacco comment: smoked socially   Substance and Sexual Activity   • Alcohol use: No     Alcohol/week: 0.0 standard drinks   • Drug use: No   • Sexual activity: Yes     Partners: Male   Lifestyle   • Physical activity:      Days per week: Not on file     Minutes per session: Not on file   • Stress: Not on file   Relationships   • Social connections:     Talks on phone: Not on file     Gets together: Not on file     Attends Faith service: Not on file     Active member of club or organization: Not on file     Attends meetings of clubs or organizations: Not on file     Relationship status: Not on file   • Intimate partner violence:     Fear of current or ex partner: Not on file     Emotionally abused: Not on file     Physically abused: Not on file     Forced sexual activity: Not on file   Other Topics Concern   • Not on file   Social History Narrative   • Not on file           PHYSICAL EXAMINATION:  Vitals:    11/27/19 1424   BP: 138/80   Pulse: 74   SpO2: 94%   Weight: 126.6 kg   Height: 5' 4\" (1.626 m)   PainSc: 7-8       General: Alert and oriented x3  Affect:  NAD  Head: normocephalic, atraumatic  Eyes: anicteric; no injection  Neck:symmetrical, supple, no LAD noted   CV: RRR, S1, S2, no murmur  Chest:  no axillary tenderness or LAD noted   Abdomen: No abdominal tenderness  Genitourinary: No urogenital tenderness  Respiratory: CTAB  Gait: Antaligic; cane user - Yes  Spine: Normal  Neuro: Able to perform finger nose coordination    ROM:   LUMBAR SPINE    Degree Pain   Flexion 70 mackey   Extension 20 yes   Left SB 20 yes   Right SB 20 yes   Left SB/E 20 yes   Right SB/E 20 yes     CERVICAL SPINE    Degree Pain   Flexion 35 yes   Extension 20 yes   Left SB 20 yes   Right SB 20 yes   Left Rotation 60 yes   Right Rotation 60 yes     KNEES    Degree Pain   Right Flexion 120 No   Right Extension 0 No   Left Flexion 120 No   Left Extension 0 No     SHOULDERS        LEFT Pain RIGHT Pain   Flexion 180 No 180 No   Abduction 180 No 180 No   Internal Rotation T9 No T9 No   External Rotation T2 No T2 No     MOTOR EXAMINATION:  UPPER EXTREMITY      LEFT RIGHT   Deltoid 5/5 5/5   Biceps 5/5 5/5   Triceps 5/5 5/5   Brachioradialis 5/5 5/5    Wrist Flexors 5/5 5/5   Wrist Extensors 5/5 5/5   Intrinsic Hand 5/5 5/5    5/5 5/5     LOWER EXTREMITY      LEFT RIGHT   Iliopsoas 5/5 5/5   Quadriceps 5/5 5/5   Foot DF 5/5 5/5   Foot EHL 5/5 5/5   Gastrocnemius 5/5 5/5     PULSES      LEFT RIGHT   Radial 2/4 2/4   Dorsalis Pedis 2/4 2/4   Posterior Tibial 2/4 2/4   Brachial 2/4 2/4     Straight Leg Raise: negative  Sacroiliac Joint tenderness negative  Catrachito's test: negative  Joint Exam: normal appearance   TPs: Absent within lumbo-sacral paraspinal muscles   TPs: Absent within all four extremities     Right Deep tendon reflexes: Right- Biceps: 2+;  Triceps: 2+;  Brachioradialis: 2+  Right- Patellar: 2+;   Achilles:2+   Left Deep tendon reflexes: Left- Biceps: 2+;  Triceps: 2+;  Brachioradialis: 2+  Left- Patellar: 2+;   Achilles:2+   Temperature:  normal to touch bilateral upper and lower extremities  Edema - no  Skin - normal in all extremities    Sensation (light touch/pinprick/temperature):   Light Touch Intact:  bilateral upper and lower extremities      IMAGING:  MRI LUMBAR SPINE WO CONTRAST     COMPARISON: February 15, 2016..     CLINICAL HISTORY:  SPONDYLOLISTHESIS     TECHNIQUE:  Sagittal T1, T2, STIR, and in and out of phase; axial T1 and  T2.      CONTRAST: None.     FINDINGS:      Lumbar vertebral bodies are unchanged in height and alignment. No fracture.  Hemangioma is redemonstrated L2 and L4. Unchanged type II degenerative  endplate signal at L2-3. There is mild degenerative disc height and signal  loss at L2-3 unchanged. The disc spaces are elsewhere preserved. A  normal-appearing conus medullaris terminates at the L1 level. The cauda  equina and filum terminale are normal. The visualized paraspinous and  retroperitoneal soft tissues are unchanged.     Findings by Level:     L1-L2:  Negative     L2-L3:   Mild disc bulge and facet arthropathy. Mild canal narrowing. No  foraminal narrowing.     L3-L4:   Mild disc bulge asymmetric to the left.  Mild facet arthropathy.  Mild canal narrowing. No foraminal narrowing.     L4-L5:   Bilateral facet arthropathy. Mild canal narrowing. No foraminal  narrowing.     L5-S1:   The previously described shallow disc protrusion is no longer  visible. Mild facet arthropathy and ligamentum flavum thickening. No canal  or foraminal narrowing.           IMPRESSION:    Multilevel mild disc and/or facet degeneration. No significant canal or  foraminal stenosis. No acute findings.    LABS:  WBC (K/mcL)   Date Value   11/20/2019 7.8     RBC (mil/mcL)   Date Value   11/20/2019 4.05     HCT (%)   Date Value   11/20/2019 34.9 (L)     HGB (g/dL)   Date Value   11/20/2019 11.0 (L)     PLT (K/mcL)   Date Value   11/20/2019 266     INR (no units)   Date Value   09/24/2018 2.3       ASSESSMENT:   54 year old female with cervicalgia, and lumbago secondary to lumbosacral spondylosis, and thoracic myofascial pain.      PLAN:  RECOMMENDATIONS:  1) Medical Modalities: None  2) Interventional Modalities: Right thoracic TPI today  3) Behavioral Medicine Modalities:  none  4) Other Modalities:  Continue HEP  5) Imaging/Labs: none    Pt will return to clinic for followup in 2-3months.     Thank you for the opportunity to care for this pleasant patient.    Glenn Rahman MD  Interventional Pain Management  [facility]

## 2022-10-18 NOTE — TELEPHONE ENCOUNTER
Increase dose of levothyroxine to 137 mcg daily (sent to [facility])  Be sure she is taking prior to meals and other medications  Repeat lab work in 8 weeks  Thanks,   Andrea Pan MD

## 2022-11-21 ENCOUNTER — TELEPHONE (OUTPATIENT)
Dept: OBSTETRICS AND GYNECOLOGY | Facility: CLINIC | Age: 32
End: 2022-11-21

## 2022-11-21 NOTE — TELEPHONE ENCOUNTER
Pt calling because she thinks that she either has bv or yeast infection. Can she have meds or does she need appt?

## 2022-11-21 NOTE — TELEPHONE ENCOUNTER
She does not really have vag d/c. She has an odor and has been on a few antibiotics recently. Apt tomorrow for eval of possible yeast or BV. She does not have a new partner and has no concerns for an STD.

## 2022-11-22 ENCOUNTER — OFFICE VISIT (OUTPATIENT)
Dept: OBSTETRICS AND GYNECOLOGY | Facility: CLINIC | Age: 32
End: 2022-11-22

## 2022-11-22 VITALS
BODY MASS INDEX: 30.49 KG/M2 | HEIGHT: 65 IN | WEIGHT: 183 LBS | SYSTOLIC BLOOD PRESSURE: 110 MMHG | DIASTOLIC BLOOD PRESSURE: 70 MMHG

## 2022-11-22 DIAGNOSIS — N89.8 VAGINAL IRRITATION: ICD-10-CM

## 2022-11-22 DIAGNOSIS — N89.8 VAGINAL ITCHING: Primary | ICD-10-CM

## 2022-11-22 PROCEDURE — 99213 OFFICE O/P EST LOW 20 MIN: CPT

## 2022-11-22 RX ORDER — FLUCONAZOLE 150 MG/1
150 TABLET ORAL AS NEEDED
Qty: 3 TABLET | Refills: 0 | Status: SHIPPED | OUTPATIENT
Start: 2022-11-22 | End: 2023-01-23

## 2022-11-22 NOTE — PROGRESS NOTES
Chief Complaint   Patient presents with   • Vaginitis         Subjective   HPI  Becky Naqvi is a 32 y.o. female, , who presents for evaluation of local irritation, odor and vulvar itching. Her symptoms have been present for 2 week(s).      Prior to the onset of symptoms she was on antibiotics for UTI a few weeks ago.  She has not recently changed soaps/detergents/toilet tissue.  Prior to this visit, she has used probiotic in an attempt to improve her symptoms. Just started taking yesterday.    Sexual History    She is currently sexually active. In the past year there has been NO new sexual partners. Condoms are never used.  She would not like to be screened for STD's at today's exam.    Current birth control method: none.    Menstrual History:    Patient's last menstrual period was 2022 (approximate).    In the past 6 months her cycles have been irregular.   Her menstrual flow is typically moderately heavy. Intermenstrual bleeding is present-spotting. She has had a US for her heavy periods.       Additional OB/GYN History   Last Pap : 22 neg, HPV neg  Last Completed Pap Smear          PAP SMEAR (Every 3 Years) Next due on 2022  LIQUID-BASED PAP SMEAR, [facility] ([facility])    2020  Done - negative; last HPV regardless 2019                OB History        2    Para   2    Term   2            AB        Living   2       SAB        IAB        Ectopic        Molar        Multiple   0    Live Births   2                The additional following portions of the patient's history were reviewed and updated as appropriate: allergies and current medications.    Review of Systems   Constitutional: Negative.  Negative for fatigue and fever.   Respiratory: Negative.  Negative for shortness of breath.    Cardiovascular: Negative.  Negative for chest pain.   Gastrointestinal: Negative.  Negative for abdominal distention and abdominal pain.  "  Genitourinary: Negative for genital sores, pelvic pain, pelvic pressure, vaginal bleeding, vaginal discharge and vaginal pain.        Vulvar/vaginal itching, irritation   Musculoskeletal: Negative.    Skin: Negative.      All other systems reviewed and are negative.     I have reviewed and agree with the HPI, ROS, and historical information as entered above. Raegan Plunkett, APRN    Objective   /70   Ht 165.1 cm (65\")   Wt 83 kg (183 lb)   LMP 11/12/2022 (Approximate)   BMI 30.45 kg/m²     Physical Exam  Vitals and nursing note reviewed. Exam conducted with a chaperone present.   Constitutional:       Appearance: Normal appearance.   Pulmonary:      Effort: Pulmonary effort is normal.   Abdominal:      Palpations: There is no mass.      Tenderness: There is no abdominal tenderness.      Hernia: No hernia is present.   Genitourinary:     General: Normal vulva.      Labia:         Right: No rash, tenderness, lesion or injury.         Left: No rash, tenderness, lesion or injury.       Vagina: No signs of injury and foreign body. Vaginal discharge (white, scant) present. No erythema, tenderness, bleeding, lesions or prolapsed vaginal walls.      Cervix: Discharge present. No cervical motion tenderness, friability, lesion, erythema or cervical bleeding.      Uterus: Normal.       Adnexa: Right adnexa normal and left adnexa normal.   Neurological:      Mental Status: She is alert and oriented to person, place, and time.   Psychiatric:         Mood and Affect: Mood normal.         Behavior: Behavior normal.         Assessment & Plan     Assessment and Plan    Problem List Items Addressed This Visit    None  Visit Diagnoses     Vaginal itching    -  Primary    Relevant Medications    fluconazole (Diflucan) 150 MG tablet    Other Relevant Orders    NuSwab VG, Candida 6sp - Swab, Vagina    Vaginal irritation        Relevant Medications    fluconazole (Diflucan) 150 MG tablet    Other Relevant Orders    NuSwab VG, " Rupa 6sp - Swab, Vagina            1. NuSwab ordered  2. Medication(s) ordered  3. Counseling on Vaginitis provided  4. Return PRN  5. abstain from coitus during course of treatment  Return for Annual physical.        Raegan Plunkett, APRN  11/22/2022

## 2022-11-25 LAB
A VAGINAE DNA VAG QL NAA+PROBE: NORMAL SCORE
BVAB2 DNA VAG QL NAA+PROBE: NORMAL SCORE
C ALBICANS DNA VAG QL NAA+PROBE: NEGATIVE
C GLABRATA DNA VAG QL NAA+PROBE: NEGATIVE
C KRUSEI DNA VAG QL NAA+PROBE: NEGATIVE
C LUSITANIAE DNA VAG QL NAA+PROBE: NEGATIVE
CANDIDA DNA VAG QL NAA+PROBE: NEGATIVE
Lab: NORMAL
MEGA1 DNA VAG QL NAA+PROBE: NORMAL SCORE
T VAGINALIS DNA VAG QL NAA+PROBE: NEGATIVE

## 2022-12-13 ENCOUNTER — OFFICE VISIT (OUTPATIENT)
Dept: ENDOCRINOLOGY | Facility: CLINIC | Age: 32
End: 2022-12-13

## 2022-12-13 ENCOUNTER — LAB (OUTPATIENT)
Dept: LAB | Facility: HOSPITAL | Age: 32
End: 2022-12-13

## 2022-12-13 VITALS
SYSTOLIC BLOOD PRESSURE: 102 MMHG | WEIGHT: 183.4 LBS | DIASTOLIC BLOOD PRESSURE: 68 MMHG | BODY MASS INDEX: 30.56 KG/M2 | HEART RATE: 92 BPM | OXYGEN SATURATION: 100 % | HEIGHT: 65 IN

## 2022-12-13 DIAGNOSIS — N92.1 MENOMETRORRHAGIA: ICD-10-CM

## 2022-12-13 DIAGNOSIS — R51.9 FREQUENT HEADACHES: ICD-10-CM

## 2022-12-13 DIAGNOSIS — E89.0 POSTSURGICAL HYPOTHYROIDISM: Primary | ICD-10-CM

## 2022-12-13 DIAGNOSIS — F41.9 ANXIETY: ICD-10-CM

## 2022-12-13 DIAGNOSIS — E89.0 POSTSURGICAL HYPOTHYROIDISM: ICD-10-CM

## 2022-12-13 PROCEDURE — 86038 ANTINUCLEAR ANTIBODIES: CPT

## 2022-12-13 PROCEDURE — 99214 OFFICE O/P EST MOD 30 MIN: CPT | Performed by: INTERNAL MEDICINE

## 2022-12-13 PROCEDURE — 85652 RBC SED RATE AUTOMATED: CPT

## 2022-12-13 PROCEDURE — 84443 ASSAY THYROID STIM HORMONE: CPT

## 2022-12-13 PROCEDURE — 86800 THYROGLOBULIN ANTIBODY: CPT

## 2022-12-13 PROCEDURE — 86376 MICROSOMAL ANTIBODY EACH: CPT

## 2022-12-13 PROCEDURE — 84445 ASSAY OF TSI GLOBULIN: CPT

## 2022-12-13 PROCEDURE — 84439 ASSAY OF FREE THYROXINE: CPT

## 2022-12-13 RX ORDER — SERTRALINE HYDROCHLORIDE 25 MG/1
TABLET, FILM COATED ORAL
Qty: 30 TABLET | Refills: 5 | Status: SHIPPED | OUTPATIENT
Start: 2022-12-13 | End: 2023-01-06 | Stop reason: SDUPTHER

## 2022-12-13 NOTE — ASSESSMENT & PLAN NOTE
Due to fluctuations in hormone levels exacerbated by thyroid fluctuation  Considering future fertility   Has discussed with gyn Dr Carney  Having some headaches with PMS - mild and relieved by tylenol

## 2022-12-13 NOTE — PROGRESS NOTES
Becky Naqvi 32 y.o.  CC:Follow-up and Hypothyroidism      [place]: Follow-up and Hypothyroidism    Some fatigue  bp is good   A lot of anxiety   Taking synthroid 137 mcg daily   Has tried prozac- did not like how she feels   Low tsh - low tsh and we made an adjustment from 150 mcg to 125 mcg   10/22 lab work tsh 22  Cycles more regular- was having breakthrough bleeding with menses previously   Some flushing, occ migraines    Allergies   Allergen Reactions   • Oxycodone-Acetaminophen Itching   • Propylthiouracil Other (See Comments)     Neutropenia       Current Outpatient Medications:   •  cetirizine (zyrTEC) 10 MG tablet, Take 10 mg by mouth Daily., Disp: , Rfl:   •  fluconazole (Diflucan) 150 MG tablet, Take 1 tablet by mouth As Needed (yeast infection) for up to 3 doses. Take one today and repeat every 3 days x 3 doses, Disp: 3 tablet, Rfl: 0  •  levothyroxine (SYNTHROID, LEVOTHROID) 137 MCG tablet, Take 1 tablet by mouth Every Morning., Disp: 90 tablet, Rfl: 1  •  sertraline (Zoloft) 25 MG tablet, Take 1/2 daily for 10 days then 1 daily, Disp: 30 tablet, Rfl: 5  Patient Active Problem List    Diagnosis    • Anxiety [F41.9]    • Menometrorrhagia [N92.1]    • Postsurgical hypothyroidism [E89.0]    • Muscle cramps [R25.2]    • Fatigue [R53.83]    • Graves disease [E05.00]    • Tremor [R25.1]    • Abnormal imaging of thyroid [R93.89]    • Delivered by  section [Z38.01]    • Anemia [D64.9]    • Ganglion of hand [M67.449]    • Gastroesophageal reflux disease [K21.9]    • 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   HENT: Negative for congestion and rhinorrhea.    Eyes: Negative for visual disturbance.   Respiratory: Negative for  "cough and shortness of breath.    Cardiovascular: Negative for palpitations and leg swelling.   Gastrointestinal: Negative for constipation, diarrhea and nausea.   Genitourinary: Positive for menstrual problem. Negative for hematuria.   Musculoskeletal: Negative for arthralgias, back pain, gait problem, joint swelling and myalgias.   Skin: Negative for color change, rash and wound.   Allergic/Immunologic: Negative for environmental allergies, food allergies and immunocompromised state.   Neurological: Negative for dizziness, weakness and light-headedness.   Psychiatric/Behavioral: Negative for confusion, decreased concentration, dysphoric mood and sleep disturbance. The patient is nervous/anxious.      Social History     Socioeconomic History   • Marital status:    Tobacco Use   • Smoking status: Never   • Smokeless tobacco: Never   Vaping Use   • Vaping Use: Never used   Substance and Sexual Activity   • Alcohol use: Yes     Comment: social   • Drug use: Never   • Sexual activity: Yes     Partners: Male     Birth control/protection: Condom     Family History   Problem Relation Age of Onset   • Hypertension Father    • Arthritis Father    • Hyperlipidemia Father    • Graves' disease Mother    • Arthritis Paternal Grandfather    • Lung cancer Paternal Grandmother    • Arthritis Paternal Grandmother    • Heart disease Paternal Grandmother    • COPD Paternal Grandmother    • Heart attack Maternal Grandmother    • Arthritis Maternal Grandmother    • Diabetes Maternal Grandmother    • Arthritis Maternal Grandfather    • Lung cancer Paternal Uncle    • Breast cancer Neg Hx    • Ovarian cancer Neg Hx    • Uterine cancer Neg Hx    • Colon cancer Neg Hx      /68   Pulse 92   Ht 165.1 cm (65\")   Wt 83.2 kg (183 lb 6.4 oz)   SpO2 100%   BMI 30.52 kg/m²   Physical Exam  Vitals and nursing note reviewed.   Constitutional:       Appearance: Normal appearance. She is well-developed.   HENT:      Head: " Normocephalic and atraumatic.   Eyes:      General: Lids are normal.      Extraocular Movements: Extraocular movements intact.      Conjunctiva/sclera: Conjunctivae normal.      Pupils: Pupils are equal, round, and reactive to light.   Neck:      Thyroid: No thyroid mass or thyromegaly.      Vascular: No carotid bruit.      Trachea: Trachea normal. No tracheal deviation.   Cardiovascular:      Rate and Rhythm: Normal rate and regular rhythm.      Pulses: Normal pulses.      Heart sounds: Normal heart sounds. No murmur heard.    No friction rub. No gallop.   Pulmonary:      Effort: Pulmonary effort is normal. No respiratory distress.      Breath sounds: Normal breath sounds. No wheezing.   Musculoskeletal:         General: No deformity. Normal range of motion.      Cervical back: Normal range of motion and neck supple.   Lymphadenopathy:      Cervical: No cervical adenopathy.   Skin:     General: Skin is warm and dry.      Findings: No erythema or rash.      Nails: There is no clubbing.   Neurological:      General: No focal deficit present.      Mental Status: She is alert and oriented to person, place, and time.      Cranial Nerves: No cranial nerve deficit.      Deep Tendon Reflexes: Reflexes are normal and symmetric. Reflexes normal.   Psychiatric:         Mood and Affect: Mood normal.         Speech: Speech normal.         Behavior: Behavior normal.         Thought Content: Thought content normal.         Judgment: Judgment normal.       Results for orders placed or performed in visit on 11/22/22   NuSwab VG, Candida 6sp - Swab, Vagina    Specimen: Vagina; Swab    Swab   Result Value Ref Range    Atopobium Vaginae Low - 0 Score    BVAB 2 Low - 0 Score    Megasphaera 1 Low - 0 Score    Candida Albicans, MUNDO Negative Negative    Candida Glabrata, MUNDO Negative Negative    C PARAPSILOSIS/TROPICALIS Negative Negative    Candida lusitaniae, MUNDO Negative Negative    Rupa krusei, MUNDO Negative Negative    Trichomonas  vaginosis Negative Negative   Please Note    Swab   Result Value Ref Range    Please note Comment      Diagnoses and all orders for this visit:    1. Postsurgical hypothyroidism (Primary)  Assessment & Plan:  tsh 0.3 on synthroid 150 mcg - then to 22 on 125 mcg   Now taking 137 mcg- check tfts     Orders:  -     TSH; Future  -     T4, Free; Future  -     Thyroid Stimulating Immunoglobulin; Future  -     Thyroid Antibodies; Future    2. Frequent headaches  -     Sedimentation Rate; Future  -     DANIEL With / DsDNA, RNP, Sjogrens A / B, Lennon; Future    3. Anxiety  Assessment & Plan:  Trial zoloft        4. Menometrorrhagia  Assessment & Plan:  Due to fluctuations in hormone levels exacerbated by thyroid fluctuation  Considering future fertility   Has discussed with gyn Dr Carney  Having some headaches with PMS - mild and relieved by tylenol      Other orders  -     sertraline (Zoloft) 25 MG tablet; Take 1/2 daily for 10 days then 1 daily  Dispense: 30 tablet; Refill: 5  Return in about 6 months (around 6/13/2023) for Recheck.    Marni Pan MD  Signed Marni Pan MD

## 2022-12-14 LAB
ERYTHROCYTE [SEDIMENTATION RATE] IN BLOOD: 8 MM/HR (ref 0–20)
T4 FREE SERPL-MCNC: 2 NG/DL (ref 0.93–1.7)
TSH SERPL DL<=0.05 MIU/L-ACNC: 0.49 UIU/ML (ref 0.27–4.2)

## 2022-12-15 LAB
ANA SER QL: NEGATIVE
THYROGLOB AB SERPL-ACNC: <1 IU/ML (ref 0–0.9)
THYROPEROXIDASE AB SERPL-ACNC: 16 IU/ML (ref 0–34)

## 2022-12-16 LAB — TSI SER-ACNC: 0.92 IU/L (ref 0–0.55)

## 2022-12-20 ENCOUNTER — TELEPHONE (OUTPATIENT)
Dept: ENDOCRINOLOGY | Facility: CLINIC | Age: 32
End: 2022-12-20

## 2022-12-20 DIAGNOSIS — D70.9 NEUTROPENIA, UNSPECIFIED TYPE: Primary | ICD-10-CM

## 2022-12-22 RX ORDER — LEVOTHYROXINE SODIUM 112 UG/1
TABLET ORAL
Qty: 90 TABLET | Refills: 1 | Status: SHIPPED | OUTPATIENT
Start: 2022-12-22 | End: 2023-01-23 | Stop reason: DRUGHIGH

## 2023-01-06 RX ORDER — SERTRALINE HYDROCHLORIDE 25 MG/1
TABLET, FILM COATED ORAL
Qty: 90 TABLET | Refills: 1 | Status: SHIPPED | OUTPATIENT
Start: 2023-01-06 | End: 2023-01-23

## 2023-01-06 NOTE — TELEPHONE ENCOUNTER
LOV 12/13/22  NOV 6/19/23    Pt was started on sertraline on 12-13-22 but [facility] gas requested a 90 day supply instead of the 30 day that was sent

## 2023-01-23 ENCOUNTER — OFFICE VISIT (OUTPATIENT)
Dept: FAMILY MEDICINE CLINIC | Facility: CLINIC | Age: 33
End: 2023-01-23
Payer: COMMERCIAL

## 2023-01-23 VITALS
DIASTOLIC BLOOD PRESSURE: 84 MMHG | HEIGHT: 65 IN | BODY MASS INDEX: 29.66 KG/M2 | TEMPERATURE: 97.4 F | SYSTOLIC BLOOD PRESSURE: 122 MMHG | RESPIRATION RATE: 14 BRPM | HEART RATE: 78 BPM | OXYGEN SATURATION: 98 % | WEIGHT: 178 LBS

## 2023-01-23 DIAGNOSIS — F41.9 ANXIETY: Primary | ICD-10-CM

## 2023-01-23 PROCEDURE — 99204 OFFICE O/P NEW MOD 45 MIN: CPT | Performed by: FAMILY MEDICINE

## 2023-01-23 RX ORDER — SERTRALINE HYDROCHLORIDE 25 MG/1
TABLET, FILM COATED ORAL
Qty: 90 TABLET | Refills: 1 | Status: SHIPPED | OUTPATIENT
Start: 2023-01-23

## 2023-01-23 RX ORDER — SERTRALINE HYDROCHLORIDE 25 MG/1
TABLET, FILM COATED ORAL
Qty: 90 TABLET | Refills: 1
Start: 2023-01-23 | End: 2023-01-23 | Stop reason: SDUPTHER

## 2023-01-23 NOTE — PROGRESS NOTES
"Chief Complaint  Establish Care (Transferring from Dr Doss in [place]. ) and Spot on R buttock  (A little tender. Hasn't gone away. )    Subjective          Becky Naqvi presents to [facility]  History of Present Illness  Becky Naqvi is a 32- year-old female presenting today to [facility].    The patient reports she had her thyroid removed due to her Graves disease.  She is being managed by endocrinology.    She reports she has untreated anxiety.  She states she was prescribed hydroxyzine in the past. It was helpful and put her to sleep.  She reports trying a trial of Prozac in 2020 for 3 weeks, she felt it made her anxiety worse.  She was given Zoloft but only took one dose because of her fear of the side effects.  She will consider trying counseling, but notes her schedule would make it hard to do.    The patient reports she noticed a \"bruise\" around 09/2022 that has not fully healed yet.  Discussed this finding with her previous PCP, did blood work in 12/2022.  She denies tenderness and pain, but there is a depressed area in the discoloration.  She reports she received a steroid injection 06/2022 in the same area  She reports seeing dermatology in 10/2022, they did not seemed concerned with the discoloration.    The following portions of the patient's history were reviewed and updated as appropriate: allergies, current medications, past family history, past medical history, past social history, past surgical history and problem list.    Objective      Physical Exam  Vitals reviewed.   Constitutional:       Appearance: She is well-developed.   HENT:      Head: Normocephalic and atraumatic.   Cardiovascular:      Rate and Rhythm: Normal rate and regular rhythm.      Heart sounds: Normal heart sounds.   Pulmonary:      Effort: Pulmonary effort is normal.      Breath sounds: Normal breath sounds.   Skin:            Comments: Right buttock: light purple discolored skin, along " with a subcutaneous dimple    Neurological:      Mental Status: She is alert and oriented to person, place, and time.   Psychiatric:         Mood and Affect: Mood is anxious.         Behavior: Behavior normal.        Result Review :                Assessment and Plan    Diagnoses and all orders for this visit:    1. Anxiety (Primary)  Assessment & Plan:  Start sertraline, titrate to 25 mg.    Orders:  -     Discontinue: sertraline (Zoloft) 25 MG tablet; Take 1/2 daily for 10 days then 1 daily  Dispense: 90 tablet; Refill: 1  -     sertraline (Zoloft) 25 MG tablet; Take 1/2 daily for 10 days then 1 daily  Dispense: 90 tablet; Refill: 1    Reassured her about skin color changing and skin dimpling in the area of right buttock.  She previously received a steroid injection in this area, likely secondary effect to that      Follow Up   Return in about 3 months (around 4/23/2023) for Recheck.  Patient was given instructions and counseling regarding her condition or for health maintenance advice. Please see specific information pulled into the AVS if appropriate.       Transcribed from ambient dictation for Kimmy Alba DO by Shirley Del Valle.  01/23/23   13:14 EST    Patient or patient representative verbalized consent to the visit recording.  I have personally performed the services described in this document as transcribed by the above individual, and it is both accurate and complete.  Kimmy Alba DO  1/23/2023  19:01 EST

## 2023-02-02 ENCOUNTER — PATIENT ROUNDING (BHMG ONLY) (OUTPATIENT)
Dept: FAMILY MEDICINE CLINIC | Facility: CLINIC | Age: 33
End: 2023-02-02
Payer: COMMERCIAL

## 2023-02-02 NOTE — PROGRESS NOTES
A My-Chart message has been sent to the patient for PATIENT ROUNDING with [facility].

## 2023-02-22 ENCOUNTER — TELEPHONE (OUTPATIENT)
Dept: ENDOCRINOLOGY | Facility: CLINIC | Age: 33
End: 2023-02-22
Payer: COMMERCIAL

## 2023-02-22 DIAGNOSIS — E05.00 GRAVES DISEASE: Primary | ICD-10-CM

## 2023-02-22 DIAGNOSIS — D64.9 ANEMIA, UNSPECIFIED TYPE: ICD-10-CM

## 2023-02-24 ENCOUNTER — CLINICAL SUPPORT (OUTPATIENT)
Dept: FAMILY MEDICINE CLINIC | Facility: CLINIC | Age: 33
End: 2023-02-24
Payer: COMMERCIAL

## 2023-02-24 DIAGNOSIS — D70.9 NEUTROPENIA, UNSPECIFIED TYPE: ICD-10-CM

## 2023-02-24 DIAGNOSIS — D64.9 ANEMIA, UNSPECIFIED TYPE: ICD-10-CM

## 2023-02-24 DIAGNOSIS — E05.00 GRAVES DISEASE: ICD-10-CM

## 2023-02-24 PROCEDURE — 36415 COLL VENOUS BLD VENIPUNCTURE: CPT | Performed by: FAMILY MEDICINE

## 2023-02-24 NOTE — PROGRESS NOTES
Venipuncture Blood Specimen Collection  Venipuncture performed in left arm by Aura Alfaro MA with good hemostasis. Patient tolerated the procedure well without complications.   02/24/23   Aura Alfaro MA

## 2023-02-25 LAB
BASOPHILS # BLD AUTO: 0 X10E3/UL (ref 0–0.2)
BASOPHILS NFR BLD AUTO: 1 %
EOSINOPHIL # BLD AUTO: 0 X10E3/UL (ref 0–0.4)
EOSINOPHIL NFR BLD AUTO: 1 %
ERYTHROCYTE [DISTWIDTH] IN BLOOD BY AUTOMATED COUNT: 12.3 % (ref 11.7–15.4)
HCT VFR BLD AUTO: 40.9 % (ref 34–46.6)
HGB BLD-MCNC: 13.3 G/DL (ref 11.1–15.9)
IMM GRANULOCYTES # BLD AUTO: 0 X10E3/UL (ref 0–0.1)
IMM GRANULOCYTES NFR BLD AUTO: 0 %
IRON SERPL-MCNC: 65 UG/DL (ref 27–159)
LYMPHOCYTES # BLD AUTO: 1.6 X10E3/UL (ref 0.7–3.1)
LYMPHOCYTES NFR BLD AUTO: 40 %
Lab: NORMAL
MCH RBC QN AUTO: 29.3 PG (ref 26.6–33)
MCHC RBC AUTO-ENTMCNC: 32.5 G/DL (ref 31.5–35.7)
MCV RBC AUTO: 90 FL (ref 79–97)
MONOCYTES # BLD AUTO: 0.3 X10E3/UL (ref 0.1–0.9)
MONOCYTES NFR BLD AUTO: 7 %
NEUTROPHILS # BLD AUTO: 2.1 X10E3/UL (ref 1.4–7)
NEUTROPHILS NFR BLD AUTO: 51 %
PLATELET # BLD AUTO: 245 X10E3/UL (ref 150–450)
RBC # BLD AUTO: 4.54 X10E6/UL (ref 3.77–5.28)
TSH SERPL DL<=0.005 MIU/L-ACNC: 5.91 UIU/ML (ref 0.45–4.5)
WBC # BLD AUTO: 4 X10E3/UL (ref 3.4–10.8)

## 2023-02-26 LAB
Lab: NORMAL
T4 FREE SERPL-MCNC: 1.28 NG/DL (ref 0.82–1.77)

## 2023-03-01 RX ORDER — LEVOTHYROXINE SODIUM 0.15 MG/1
150 TABLET ORAL
Qty: 90 TABLET | Refills: 1 | Status: SHIPPED | OUTPATIENT
Start: 2023-03-01

## 2023-03-19 DIAGNOSIS — R06.83 SNORING: Primary | ICD-10-CM

## 2023-03-19 DIAGNOSIS — R29.818 SUSPECTED SLEEP APNEA: ICD-10-CM

## 2023-04-12 RX ORDER — LEVOTHYROXINE SODIUM 137 UG/1
TABLET ORAL
Qty: 90 TABLET | Refills: 1 | OUTPATIENT
Start: 2023-04-12

## 2023-06-19 ENCOUNTER — OFFICE VISIT (OUTPATIENT)
Dept: ENDOCRINOLOGY | Facility: CLINIC | Age: 33
End: 2023-06-19
Payer: COMMERCIAL

## 2023-06-19 VITALS
DIASTOLIC BLOOD PRESSURE: 72 MMHG | WEIGHT: 181.8 LBS | OXYGEN SATURATION: 99 % | HEIGHT: 65 IN | BODY MASS INDEX: 30.29 KG/M2 | SYSTOLIC BLOOD PRESSURE: 118 MMHG | HEART RATE: 75 BPM

## 2023-06-19 DIAGNOSIS — E89.0 POSTSURGICAL HYPOTHYROIDISM: Primary | ICD-10-CM

## 2023-06-19 DIAGNOSIS — R06.83 SNORING: ICD-10-CM

## 2023-06-19 LAB
ALBUMIN SERPL-MCNC: 4.7 G/DL (ref 3.5–5.2)
ALBUMIN/GLOB SERPL: 2 G/DL
ALP SERPL-CCNC: 50 U/L (ref 39–117)
ALT SERPL W P-5'-P-CCNC: 17 U/L (ref 1–33)
ANION GAP SERPL CALCULATED.3IONS-SCNC: 10.9 MMOL/L (ref 5–15)
AST SERPL-CCNC: 22 U/L (ref 1–32)
BASOPHILS # BLD AUTO: 0.04 10*3/MM3 (ref 0–0.2)
BASOPHILS NFR BLD AUTO: 0.8 % (ref 0–1.5)
BILIRUB SERPL-MCNC: 0.3 MG/DL (ref 0–1.2)
BUN SERPL-MCNC: 15 MG/DL (ref 6–20)
BUN/CREAT SERPL: 19 (ref 7–25)
CALCIUM SPEC-SCNC: 9.3 MG/DL (ref 8.6–10.5)
CHLORIDE SERPL-SCNC: 104 MMOL/L (ref 98–107)
CHOLEST SERPL-MCNC: 192 MG/DL (ref 0–200)
CO2 SERPL-SCNC: 25.1 MMOL/L (ref 22–29)
CREAT SERPL-MCNC: 0.79 MG/DL (ref 0.57–1)
DEPRECATED RDW RBC AUTO: 38.6 FL (ref 37–54)
EGFRCR SERPLBLD CKD-EPI 2021: 100.8 ML/MIN/1.73
EOSINOPHIL # BLD AUTO: 0.05 10*3/MM3 (ref 0–0.4)
EOSINOPHIL NFR BLD AUTO: 1 % (ref 0.3–6.2)
ERYTHROCYTE [DISTWIDTH] IN BLOOD BY AUTOMATED COUNT: 12.1 % (ref 12.3–15.4)
GLOBULIN UR ELPH-MCNC: 2.4 GM/DL
GLUCOSE SERPL-MCNC: 86 MG/DL (ref 65–99)
HCT VFR BLD AUTO: 38.2 % (ref 34–46.6)
HDLC SERPL-MCNC: 61 MG/DL (ref 40–60)
HGB BLD-MCNC: 12.8 G/DL (ref 12–15.9)
IMM GRANULOCYTES # BLD AUTO: 0 10*3/MM3 (ref 0–0.05)
IMM GRANULOCYTES NFR BLD AUTO: 0 % (ref 0–0.5)
LDLC SERPL CALC-MCNC: 101 MG/DL (ref 0–100)
LDLC/HDLC SERPL: 1.58 {RATIO}
LYMPHOCYTES # BLD AUTO: 1.65 10*3/MM3 (ref 0.7–3.1)
LYMPHOCYTES NFR BLD AUTO: 33.2 % (ref 19.6–45.3)
MCH RBC QN AUTO: 29.8 PG (ref 26.6–33)
MCHC RBC AUTO-ENTMCNC: 33.5 G/DL (ref 31.5–35.7)
MCV RBC AUTO: 88.8 FL (ref 79–97)
MONOCYTES # BLD AUTO: 0.39 10*3/MM3 (ref 0.1–0.9)
MONOCYTES NFR BLD AUTO: 7.8 % (ref 5–12)
NEUTROPHILS NFR BLD AUTO: 2.84 10*3/MM3 (ref 1.7–7)
NEUTROPHILS NFR BLD AUTO: 57.2 % (ref 42.7–76)
NRBC BLD AUTO-RTO: 0 /100 WBC (ref 0–0.2)
PLATELET # BLD AUTO: 234 10*3/MM3 (ref 140–450)
PMV BLD AUTO: 11.4 FL (ref 6–12)
POTASSIUM SERPL-SCNC: 4.2 MMOL/L (ref 3.5–5.2)
PROT SERPL-MCNC: 7.1 G/DL (ref 6–8.5)
RBC # BLD AUTO: 4.3 10*6/MM3 (ref 3.77–5.28)
SODIUM SERPL-SCNC: 140 MMOL/L (ref 136–145)
TRIGL SERPL-MCNC: 172 MG/DL (ref 0–150)
VLDLC SERPL-MCNC: 30 MG/DL (ref 5–40)
WBC NRBC COR # BLD: 4.97 10*3/MM3 (ref 3.4–10.8)

## 2023-06-19 PROCEDURE — 99214 OFFICE O/P EST MOD 30 MIN: CPT | Performed by: INTERNAL MEDICINE

## 2023-06-19 NOTE — PROGRESS NOTES
Becky Joinerdeja Naqvi 33 y.o.  CC:Follow-up and Hypothyroidism (Patient not sure which dose of levothyroxine she is on - maybe 137 or 150mcg )      [place]: Follow-up and Hypothyroidism (Patient not sure which dose of levothyroxine she is on - maybe 137 or 150mcg )    Healthy overall  Bp is good  Starting to feel off- more fatigue, irritable  Taking zoloft now - some panicky feelings, palpitations  Has had more stressors  Snoring more- plans to discuss with PCP   Discussed steroid nasal spray trial - 1 spray each nostril at hs     Allergies   Allergen Reactions    Oxycodone-Acetaminophen Itching    Propylthiouracil Other (See Comments)     Neutropenia       Current Outpatient Medications:     cetirizine (zyrTEC) 10 MG tablet, Take 10 mg by mouth Daily., Disp: , Rfl:     levothyroxine (SYNTHROID, LEVOTHROID) 150 MCG tablet, Take 1 tablet by mouth Every Morning., Disp: 90 tablet, Rfl: 1    sertraline (Zoloft) 25 MG tablet, Take 1/2 daily for 10 days then 1 daily, Disp: 90 tablet, Rfl: 1  Patient Active Problem List    Diagnosis     Snoring [R06.83]     Anxiety [F41.9]     Menometrorrhagia [N92.1]     Postsurgical hypothyroidism [E89.0]     Muscle cramps [R25.2]     Fatigue [R53.83]     Graves disease [E05.00]     Tremor [R25.1]     Abnormal imaging of thyroid [R93.89]     Delivered by  section [Z38.01]     Anemia [D64.9]     Ganglion of hand [M67.449]     Gastroesophageal reflux disease [K21.9]     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 Negative for activity change, appetite change and unexpected weight change.   HENT:  Negative for congestion and rhinorrhea.    Eyes:  Negative for visual disturbance.   Respiratory:  Negative for cough and shortness of breath.    Cardiovascular:  Negative for  "palpitations and leg swelling.   Gastrointestinal:  Negative for constipation, diarrhea and nausea.   Genitourinary:  Negative for hematuria.   Musculoskeletal:  Negative for arthralgias, back pain, gait problem, joint swelling and myalgias.   Skin:  Negative for color change, rash and wound.   Allergic/Immunologic: Negative for environmental allergies, food allergies and immunocompromised state.   Neurological:  Negative for dizziness, weakness and light-headedness.   Psychiatric/Behavioral:  Positive for sleep disturbance. Negative for confusion, decreased concentration and dysphoric mood. The patient is not nervous/anxious.    Social History     Socioeconomic History    Marital status:    Tobacco Use    Smoking status: Never    Smokeless tobacco: Never   Vaping Use    Vaping Use: Never used   Substance and Sexual Activity    Alcohol use: Yes     Alcohol/week: 2.0 standard drinks     Types: 2 Cans of beer per week     Comment: Socially, less than weekly    Drug use: Never    Sexual activity: Yes     Partners: Male     Birth control/protection: Condom     Family History   Problem Relation Age of Onset    Hypertension Father     Arthritis Father     Hyperlipidemia Father     Alcohol abuse Father     COPD Father     Graves' disease Mother     Arthritis Mother     Thyroid disease Mother     Arthritis Paternal Grandfather     Lung cancer Paternal Grandmother     Arthritis Paternal Grandmother     Heart disease Paternal Grandmother     COPD Paternal Grandmother     Cancer Paternal Grandmother     Heart attack Maternal Grandmother     Arthritis Maternal Grandmother     Diabetes Maternal Grandmother     Heart disease Maternal Grandmother     Arthritis Maternal Grandfather     Cancer Maternal Grandfather     Lung cancer Paternal Uncle     Cancer Paternal Uncle     Breast cancer Neg Hx     Ovarian cancer Neg Hx     Uterine cancer Neg Hx     Colon cancer Neg Hx      /72   Pulse 75   Ht 165.1 cm (65\")   Wt " 82.5 kg (181 lb 12.8 oz)   SpO2 99%   BMI 30.25 kg/m²   Physical Exam  Vitals and nursing note reviewed.   Constitutional:       Appearance: Normal appearance. She is well-developed.   HENT:      Head: Normocephalic and atraumatic.   Eyes:      General: Lids are normal.      Extraocular Movements: Extraocular movements intact.      Conjunctiva/sclera: Conjunctivae normal.      Pupils: Pupils are equal, round, and reactive to light.   Neck:      Thyroid: No thyroid mass or thyromegaly.      Vascular: No carotid bruit.      Trachea: Trachea normal. No tracheal deviation.   Cardiovascular:      Rate and Rhythm: Normal rate and regular rhythm.      Pulses: Normal pulses.      Heart sounds: Normal heart sounds. No murmur heard.    No friction rub. No gallop.   Pulmonary:      Effort: Pulmonary effort is normal. No respiratory distress.      Breath sounds: Normal breath sounds. No wheezing.   Musculoskeletal:         General: No deformity. Normal range of motion.      Cervical back: Normal range of motion and neck supple.   Lymphadenopathy:      Cervical: No cervical adenopathy.   Skin:     General: Skin is warm and dry.      Findings: No erythema or rash.      Nails: There is no clubbing.   Neurological:      General: No focal deficit present.      Mental Status: She is alert and oriented to person, place, and time.      Cranial Nerves: No cranial nerve deficit.      Deep Tendon Reflexes: Reflexes are normal and symmetric. Reflexes normal.   Psychiatric:         Mood and Affect: Mood normal.         Speech: Speech normal.         Behavior: Behavior normal.         Thought Content: Thought content normal.         Judgment: Judgment normal.     Results for orders placed or performed in visit on 02/24/23   T4, Free   Result Value Ref Range    Free T4 1.28 0.82 - 1.77 ng/dL   Please Note   Result Value Ref Range    Please note Comment      Diagnoses and all orders for this visit:    1. Postsurgical hypothyroidism  (Primary)  Assessment & Plan:  Taking synthroid 150 mcg daily   Update tfts  C/o fatigue     Orders:  -     Lipid Panel; Future  -     TSH; Future  -     T4, Free; Future  -     Lipid Panel  -     TSH  -     T4, Free    2. Snoring  Assessment & Plan:  Trial steroid nasal spray   Consider sleep study     Orders:  -     Comprehensive Metabolic Panel; Future  -     CBC Auto Differential; Future  -     Comprehensive Metabolic Panel  -     CBC Auto Differential    Return in about 6 months (around 12/19/2023) for Recheck.    Marni Pan MD  Signed Marni Pan MD

## 2023-06-20 LAB
T4 FREE SERPL-MCNC: 1.41 NG/DL (ref 0.93–1.7)
TSH SERPL DL<=0.05 MIU/L-ACNC: 2.97 UIU/ML (ref 0.27–4.2)

## 2023-07-24 DIAGNOSIS — F41.9 ANXIETY: Primary | ICD-10-CM

## 2023-07-27 ENCOUNTER — OFFICE VISIT (OUTPATIENT)
Dept: OBSTETRICS AND GYNECOLOGY | Facility: CLINIC | Age: 33
End: 2023-07-27
Payer: COMMERCIAL

## 2023-07-27 VITALS
HEIGHT: 65 IN | DIASTOLIC BLOOD PRESSURE: 80 MMHG | BODY MASS INDEX: 30.12 KG/M2 | WEIGHT: 180.8 LBS | SYSTOLIC BLOOD PRESSURE: 112 MMHG

## 2023-07-27 DIAGNOSIS — Z12.39 ENCOUNTER FOR BREAST CANCER SCREENING USING NON-MAMMOGRAM MODALITY: ICD-10-CM

## 2023-07-27 DIAGNOSIS — N94.3 PMS (PREMENSTRUAL SYNDROME): ICD-10-CM

## 2023-07-27 DIAGNOSIS — Z01.419 WOMEN'S ANNUAL ROUTINE GYNECOLOGICAL EXAMINATION: Primary | ICD-10-CM

## 2023-07-27 NOTE — PROGRESS NOTES
Gynecologic Annual Exam Note        Gynecologic Exam (annual)        Subjective     HPI  Becky Naqvi is a 33 y.o.  female who presents for annual well woman exam as a established patient. There were no changes to her medical or surgical history since her last visit.. Patient reports problems with: anxiety, moodiness, and cramping . Patient's last menstrual period was 2023 (approximate).. Her periods occur every 25-35 days , lasting 5 days. The flow is heavy on the second day saturating a pad/tampon every 1 hours. This heavy bleeding lasts for 1 day of her period, then is light to moderate. She reports dysmenorrhea is moderate, occurring premenstrually and first 1-2 days of flow. Partner Status: Marital Status: .  She is sexually active. She has not had new partners.. STD testing recommendations have been explained to the patient and she does not desire STD testing.    Additional OB/GYN History   Current contraception: contraceptive methods: Condoms  Desires to:  discuss a bilateral salpingectomy  contraception. Patient's  is scheduled for a vasectomy in September.  Thromboembolic Disease: none  Age of menarche: 11    History of STD: yes  HPV Genital warts    Last Pap : 22. Results: negative. HPV: negative.   Last Completed Pap Smear            PAP SMEAR (Every 3 Years) Next due on 2022  LIQUID-BASED PAP SMEAR, [facility] ([facility])    2020  Done - negative; last HPV regardless 2019                     History of abnormal Pap smear: no  Gardasil status:refuses  Family history of uterine, colon, breast, or ovarian cancer: no  Performs monthly Self-Breast Exam: no  Exercises Regularly:yes  Feelings of Anxiety or Depression: yes - managed well with Zoloft  Tobacco Usage?: No       Current Outpatient Medications:     cetirizine (zyrTEC) 10 MG tablet, Take 1 tablet by mouth Daily., Disp: , Rfl:     hydrOXYzine (ATARAX) 25 MG tablet, Take 0.5-1  tablets by mouth Every 6 (Six) Hours As Needed for Anxiety., Disp: , Rfl:     levothyroxine (SYNTHROID, LEVOTHROID) 150 MCG tablet, Take one tablet PO q day in the AM and an extra 1/2 tablet on Sundays, Disp: 96 tablet, Rfl: 1    sertraline (Zoloft) 50 MG tablet, Take 1 tablet by mouth Daily. (Patient taking differently: Take 1 tablet by mouth Daily. Patient taking 25 mg), Disp: 90 tablet, Rfl: 1     Patient denies the need for medication refills today.    OB History          2    Para   2    Term   2            AB        Living   2         SAB        IAB        Ectopic        Molar        Multiple   0    Live Births   2                Health Maintenance   Topic Date Due    TDAP/TD VACCINES (1 - Tdap) Never done    HEPATITIS C SCREENING  Never done    ANNUAL PHYSICAL  Never done    COVID-19 Vaccine (2 - Moderna series) 2021    Annual Gynecologic Pelvic and Breast Exam  2023    INFLUENZA VACCINE  10/01/2023    PAP SMEAR  2025    Pneumococcal Vaccine 0-64  Aged Out       Past Medical History:   Diagnosis Date    Allergic     Anemia     In the past    Anxiety     Genital warts     GERD (gastroesophageal reflux disease)     Graves disease     Headache     hormonal/tension    Hyperthyroidism 2014    Inflammatory bowel disease 3/2016    colitis    Umbilical hernia 10/2019    Wears glasses         Past Surgical History:   Procedure Laterality Date     SECTION      2016     SECTION N/A 2018    Procedure:  SECTION REPEAT * 40 WKS *;  Surgeon: Becky Lobo MD;  Location: [facility] LABOR DELIVERY;  Service: Obstetrics/Gynecology    COLONOSCOPY      KNEE ACL RECONSTRUCTION      KNEE ARTHROSCOPY W/ MENISCAL REPAIR      THYROIDECTOMY N/A 3/11/2022    Procedure: TOTAL THYROIDECTOMY;  Surgeon: Bear Sharpe MD;  Location: [facility] OR;  Service: General;  Laterality: N/A;    WISDOM TOOTH EXTRACTION         The additional following portions of the patient's history  "were reviewed and updated as appropriate: allergies, current medications, past family history, past medical history, past social history, past surgical history, and problem list.    Review of Systems   Constitutional: Negative.    HENT: Negative.     Eyes: Negative.    Respiratory: Negative.     Cardiovascular: Negative.    Gastrointestinal: Negative.    Endocrine: Negative.    Genitourinary: Negative.         Dysmenorrhea    Musculoskeletal: Negative.    Skin: Negative.    Allergic/Immunologic: Negative.    Neurological: Negative.    Hematological: Negative.    Psychiatric/Behavioral:  The patient is nervous/anxious.         Moodiness       I have reviewed and agree with the HPI, ROS, and historical information as entered above. Emma Carney MD          Objective   /80   Ht 165.1 cm (65\")   Wt 82 kg (180 lb 12.8 oz)   LMP 07/12/2023 (Approximate)   BMI 30.09 kg/m²     Physical Exam  Vitals and nursing note reviewed. Exam conducted with a chaperone present.   Constitutional:       Appearance: She is well-developed.   HENT:      Head: Normocephalic and atraumatic.   Neck:      Thyroid: No thyroid mass or thyromegaly.   Cardiovascular:      Rate and Rhythm: Normal rate and regular rhythm.      Heart sounds: No murmur heard.  Pulmonary:      Effort: Pulmonary effort is normal. No retractions.      Breath sounds: Normal breath sounds. No wheezing, rhonchi or rales.   Chest:      Chest wall: No mass or tenderness.   Breasts:     Right: Normal. No mass, nipple discharge, skin change or tenderness.      Left: Normal. No mass, nipple discharge, skin change or tenderness.   Abdominal:      General: Bowel sounds are normal.      Palpations: Abdomen is soft. Abdomen is not rigid. There is no mass.      Tenderness: There is no abdominal tenderness. There is no guarding.      Hernia: No hernia is present. There is no hernia in the left inguinal area.   Genitourinary:     Labia:         Right: No rash, tenderness or " lesion.         Left: No rash, tenderness or lesion.       Vagina: Normal. No vaginal discharge or lesions.      Cervix: No cervical motion tenderness, discharge, lesion or cervical bleeding.      Uterus: Normal. Not enlarged, not fixed and not tender.       Adnexa:         Right: No mass or tenderness.          Left: No mass or tenderness.        Rectum: No external hemorrhoid.   Musculoskeletal:      Cervical back: Normal range of motion. No muscular tenderness.   Neurological:      Mental Status: She is alert and oriented to person, place, and time.   Psychiatric:         Behavior: Behavior normal.          Assessment and Plan    Problem List Items Addressed This Visit    None  Visit Diagnoses       Women's annual routine gynecological examination    -  Primary    Encounter for breast cancer screening using non-mammogram modality        PMS (premenstrual syndrome)                GYN annual well woman exam.   Reviewed pap guidelines.   Recommended use of Vitamin D replacement and getting adequate calcium in her diet. (1500mg)  Reviewed monthly self breast exams.  Instructed to call with lumps, pain, or breast discharge.    Reviewed HPV guidelines.  Reviewed exercise as a preventative health measures.   PMS symptoms - discussed etiology and options for treatment including hormonal, dietary and antidepressants. Encouraged menstrual calenders to track symptoms and encouraged luteal phase calcium, exercise and dietary changes. She takes low dose Zoloft and rec increasing the dose during luteal phase or even only on the days she has mood issues has been shown to be effective.  Heavy periods - rec NSAIDS to help reduce flow.  She is not interested in DIANA or hormones to help at this time.  She feels much better off hormones than on.    Return in about 1 year (around 7/27/2024).      Emma Carney MD  07/27/2023

## 2023-08-10 NOTE — TELEPHONE ENCOUNTER
Order created- ok to let her know she can go to any outpatient lab / diagnostic center for collection or can come here Thursday or Friday from 8 am to 4 pm  Thanks,   Andrea Pan MD    DISPLAY PLAN FREE TEXT DISPLAY PLAN FREE TEXT DISPLAY PLAN FREE TEXT

## 2023-08-22 ENCOUNTER — OFFICE VISIT (OUTPATIENT)
Dept: FAMILY MEDICINE CLINIC | Facility: CLINIC | Age: 33
End: 2023-08-22
Payer: COMMERCIAL

## 2023-08-22 VITALS
HEIGHT: 65 IN | OXYGEN SATURATION: 98 % | TEMPERATURE: 98.4 F | SYSTOLIC BLOOD PRESSURE: 118 MMHG | RESPIRATION RATE: 16 BRPM | WEIGHT: 174 LBS | BODY MASS INDEX: 28.99 KG/M2 | DIASTOLIC BLOOD PRESSURE: 76 MMHG | HEART RATE: 82 BPM

## 2023-08-22 DIAGNOSIS — K90.9 INTESTINAL MALABSORPTION, UNSPECIFIED TYPE: Primary | ICD-10-CM

## 2023-08-22 DIAGNOSIS — E03.9 ACQUIRED HYPOTHYROIDISM: ICD-10-CM

## 2023-08-22 DIAGNOSIS — K58.0 IRRITABLE BOWEL SYNDROME WITH DIARRHEA: ICD-10-CM

## 2023-08-22 PROCEDURE — 99214 OFFICE O/P EST MOD 30 MIN: CPT | Performed by: PHYSICIAN ASSISTANT

## 2023-08-22 NOTE — PROGRESS NOTES
"Subjective   Becky Cecile Naqvi is a 33 y.o. female.     History of Present Illness   Pt presents with ongoing GI concerns.     Suspects she has IBS but never formerly diagnosed. Issues have been ongoing for many years but feel like they are getting worse     Afraid of upsetting stomach and diarrhea   Has not been eating as much   Down 6 lbs since last month     Eating mostly bananas and protein shakes     Hx of ischemic colitis after having son (now 7 years old)     Colonoscopy in 2016. And normal other than colitis noted above     Generally eats a lot of fruits, vegetables, nuts and seeds. Noticing all of this showing up in stool and concerned she is not properly digesting foods     Noticing white specks in stools even when not eating seeds or quinoa. Parasite?     Does eat meals quickly     Does not usually eat ground beef. Ate hamburger recently and this caused diarrhea and stomach upset in the days that followed     Does note symptoms worse with certain foods like eating salad a few days in a row, movie theater popcorn etch     No recent constipation. Had when younger.     No abdominal pain but notes a day of some RUQ discomfort that has since resolved. Does still have gallbladder     No significant heartburn/reflux or trouble swallowing     Has hypothyroidism. Follows with endo. Last dose change with thyroid was in the last few months     Not taking any medications OTC for symptoms, just modifies what she is eating.     The following portions of the patient's history were reviewed and updated as appropriate: allergies, current medications, past family history, past medical history, past social history, past surgical history, and problem list.    Review of Systems  As noted per HPI     Objective   Blood pressure 118/76, pulse 82, temperature 98.4 øF (36.9 øC), resp. rate 16, height 165.1 cm (65\"), weight 78.9 kg (174 lb), last menstrual period 07/12/2023, SpO2 98 %, not currently breastfeeding.   Physical " Exam  Vitals and nursing note reviewed.   Constitutional:       Appearance: She is well-developed.   HENT:      Head: Normocephalic and atraumatic.      Right Ear: External ear normal.      Left Ear: External ear normal.      Nose: Nose normal.      Mouth/Throat:      Pharynx: No oropharyngeal exudate.   Eyes:      Conjunctiva/sclera: Conjunctivae normal.   Neck:      Thyroid: No thyromegaly.      Trachea: No tracheal deviation.   Cardiovascular:      Rate and Rhythm: Normal rate and regular rhythm.      Heart sounds: Normal heart sounds. No murmur heard.    No friction rub. No gallop.   Pulmonary:      Effort: Pulmonary effort is normal. No respiratory distress.      Breath sounds: Normal breath sounds. No wheezing or rales.   Chest:      Chest wall: No tenderness.   Abdominal:      General: Bowel sounds are normal. There is no distension.      Palpations: Abdomen is soft. There is no mass.      Tenderness: There is no abdominal tenderness. There is no guarding or rebound.      Hernia: No hernia is present.   Musculoskeletal:      Cervical back: Normal range of motion and neck supple.   Lymphadenopathy:      Cervical: No cervical adenopathy.   Skin:     General: Skin is warm and dry.   Neurological:      Mental Status: She is alert and oriented to person, place, and time.   Psychiatric:         Mood and Affect: Mood is anxious.         Behavior: Behavior normal.         Thought Content: Thought content normal.         Judgment: Judgment normal.       Assessment & Plan   Diagnoses and all orders for this visit:    1. Intestinal malabsorption, unspecified type (Primary)  -     CBC w AUTO Differential  -     Comprehensive metabolic panel  -     TSH  -     T4, free  -     Iron Profile  -     Vitamin B12  -     Folate  -     Magnesium  -     Vitamin D 25 hydroxy  -     Celiac Disease Antibody Screen  -     Food Allergy Profile  -     Vitamin B1, Whole Blood  -     Stool Culture (Reference Lab) - Stool, Per Rectum  -      Ova & Parasite Examination - Stool, Per Rectum    2. Acquired hypothyroidism  -     CBC w AUTO Differential  -     Comprehensive metabolic panel  -     TSH  -     T4, free    3. Irritable bowel syndrome with diarrhea  -     Ambulatory Referral to Gastroenterology    Detailed lab panel and stool study as noted   Referral to GI placed   Recommend OTC probiotic and fiber supplement

## 2023-08-25 LAB
25(OH)D3+25(OH)D2 SERPL-MCNC: 30.7 NG/ML (ref 30–100)
ALBUMIN SERPL-MCNC: 5 G/DL (ref 3.9–4.9)
ALBUMIN/GLOB SERPL: 1.9 {RATIO} (ref 1.2–2.2)
ALP SERPL-CCNC: 58 IU/L (ref 44–121)
ALT SERPL-CCNC: 15 IU/L (ref 0–32)
AST SERPL-CCNC: 17 IU/L (ref 0–40)
BASOPHILS # BLD AUTO: 0 X10E3/UL (ref 0–0.2)
BASOPHILS NFR BLD AUTO: 1 %
BILIRUB SERPL-MCNC: 0.3 MG/DL (ref 0–1.2)
BUN SERPL-MCNC: 9 MG/DL (ref 6–20)
BUN/CREAT SERPL: 11 (ref 9–23)
CALCIUM SERPL-MCNC: 9.6 MG/DL (ref 8.7–10.2)
CHLORIDE SERPL-SCNC: 102 MMOL/L (ref 96–106)
CLAM IGE QN: <0.1 KU/L
CO2 SERPL-SCNC: 22 MMOL/L (ref 20–29)
CODFISH IGE QN: <0.1 KU/L
CONV CLASS DESCRIPTION: ABNORMAL
CORN IGE QN: <0.1 KU/L
COW MILK IGE QN: <0.1 KU/L
CREAT SERPL-MCNC: 0.8 MG/DL (ref 0.57–1)
EGFRCR SERPLBLD CKD-EPI 2021: 99 ML/MIN/1.73
EGG WHITE IGE QN: 0.16 KU/L
EOSINOPHIL # BLD AUTO: 0.1 X10E3/UL (ref 0–0.4)
EOSINOPHIL NFR BLD AUTO: 1 %
ERYTHROCYTE [DISTWIDTH] IN BLOOD BY AUTOMATED COUNT: 12.3 % (ref 11.7–15.4)
FOLATE SERPL-MCNC: 17.2 NG/ML
GLIADIN PEPTIDE IGA SER-ACNC: 8 UNITS (ref 0–19)
GLOBULIN SER CALC-MCNC: 2.7 G/DL (ref 1.5–4.5)
GLUCOSE SERPL-MCNC: 90 MG/DL (ref 70–99)
HCT VFR BLD AUTO: 38.5 % (ref 34–46.6)
HGB BLD-MCNC: 12.9 G/DL (ref 11.1–15.9)
IGA SERPL-MCNC: 178 MG/DL (ref 87–352)
IMM GRANULOCYTES # BLD AUTO: 0 X10E3/UL (ref 0–0.1)
IMM GRANULOCYTES NFR BLD AUTO: 0 %
IRON SATN MFR SERPL: 14 % (ref 15–55)
IRON SERPL-MCNC: 49 UG/DL (ref 27–159)
LYMPHOCYTES # BLD AUTO: 1.5 X10E3/UL (ref 0.7–3.1)
LYMPHOCYTES NFR BLD AUTO: 41 %
MAGNESIUM SERPL-MCNC: 1.9 MG/DL (ref 1.6–2.3)
MCH RBC QN AUTO: 28.9 PG (ref 26.6–33)
MCHC RBC AUTO-ENTMCNC: 33.5 G/DL (ref 31.5–35.7)
MCV RBC AUTO: 86 FL (ref 79–97)
MONOCYTES # BLD AUTO: 0.4 X10E3/UL (ref 0.1–0.9)
MONOCYTES NFR BLD AUTO: 10 %
NEUTROPHILS # BLD AUTO: 1.7 X10E3/UL (ref 1.4–7)
NEUTROPHILS NFR BLD AUTO: 47 %
PEANUT IGE QN: <0.1 KU/L
PLATELET # BLD AUTO: 235 X10E3/UL (ref 150–450)
POTASSIUM SERPL-SCNC: 4.2 MMOL/L (ref 3.5–5.2)
PROT SERPL-MCNC: 7.7 G/DL (ref 6–8.5)
RBC # BLD AUTO: 4.47 X10E6/UL (ref 3.77–5.28)
SCALLOP IGE QN: <0.1 KU/L
SESAME SEED IGE QN: <0.1 KU/L
SHRIMP IGE QN: <0.1 KU/L
SODIUM SERPL-SCNC: 139 MMOL/L (ref 134–144)
SOYBEAN IGE QN: <0.1 KU/L
T4 FREE SERPL-MCNC: 1.98 NG/DL (ref 0.82–1.77)
TIBC SERPL-MCNC: 361 UG/DL (ref 250–450)
TSH SERPL DL<=0.005 MIU/L-ACNC: 0.3 UIU/ML (ref 0.45–4.5)
TTG IGA SER-ACNC: <2 U/ML (ref 0–3)
UIBC SERPL-MCNC: 312 UG/DL (ref 131–425)
VIT B1 BLD-SCNC: 98.9 NMOL/L (ref 66.5–200)
VIT B12 SERPL-MCNC: 1286 PG/ML (ref 232–1245)
WALNUT IGE QN: <0.1 KU/L
WBC # BLD AUTO: 3.7 X10E3/UL (ref 3.4–10.8)
WHEAT IGE QN: <0.1 KU/L

## 2023-08-30 LAB — VIT B1 BLD-SCNC: 103.4 NMOL/L (ref 66.5–200)

## 2023-09-01 LAB
BACTERIA SPEC CULT: NORMAL
BACTERIA SPEC CULT: NORMAL
CAMPYLOBACTER STL CULT: NORMAL
E COLI SXT STL QL IA: NEGATIVE
O+P SPEC MICRO: NORMAL
O+P STL CONC: NORMAL
SALM + SHIG STL CULT: NORMAL

## 2023-09-05 ENCOUNTER — TELEPHONE (OUTPATIENT)
Dept: ENDOCRINOLOGY | Facility: CLINIC | Age: 33
End: 2023-09-05

## 2023-09-05 ENCOUNTER — TELEPHONE (OUTPATIENT)
Dept: FAMILY MEDICINE CLINIC | Facility: CLINIC | Age: 33
End: 2023-09-05
Payer: COMMERCIAL

## 2023-09-05 NOTE — TELEPHONE ENCOUNTER
TSH is low- ok to go back to current daily dose (skip extra 1/2 pill weekly)  Repeat lab work in 8 weeks   Thanks, coleen

## 2023-09-05 NOTE — TELEPHONE ENCOUNTER
[facility] Transfer of Care Note    ADMISSION DATE:  3/18/2022  CONSULTING PHYSICIAN:  Amie Young PA-C  ATTENDING PHYSICIAN:  Carly Chadwick MD   CODE STATUS:  Prior      ASSESSMENT:     Patient brought to ER 3/18 due to knee pain for 2 weeks with fevers. Found to be anemic (hgb 9) with melena on rectal exam. GI asked to see patient for GI bleeding.      1. Acute normocytic anemia. Progressive drop in Hgb from 16 on March 9 to 9.8 today, March 18. Care Everywhere records show Hgb 17.8 last Oct. Limited labs in Epic. Elevated ferritin; iron panel in progress. Melenic, heme positive, stool on rectal exam today. Reports ibuprofen three times a day for 2 years. Suspect this is acute GI blood loss anemia. Consider slow UGI bleed from NSAID ulcer. Appears hemodynamically stable.    2. Diabetes. Recently started insulin, glucose 353 in ER today. Patient states he has had nothing to eat today. Ambulance arrived when they were delivering breakfast.   3. Fever. Temp 100.8 today. Blood cultures ordered. On antibiotic.    4. Obesity. BMI 38.   5. Transgender. Undergoing HRT ([facility]) for changing from male to female.      PLAN:    1. Will hold PPI infusion and order pantoprazole 40 mg BID.  2. Will allow clear liquids.    3. EGD to be decided.   4. Dr. Payton is following over the weekend. Dr. Henry resumes care on Monday March 21.     Amie Young PA-C          GI Physician:  I saw and examined the patient. I reviewed the note of the PA/NP and agree with her documentation.   Exam showed regular heart rate, normal breath sounds, no peripheral edema.  Abdominal examination showed no abdominal distention, no palpable hepatomegaly, splenomegaly or mass. Normal bowel sounds. No abdominal tenderness.  Impression:  Acute drop in hemoglobin and melenic stool on digital rectal exam.  Patient admits to frequent use of NSAIDs.  For upper endoscopy tomorrow.  Clear liquid diet  Pt calling for lab results.    [phone]   today  Sha Henry M.D.            CHIEF COMPLAINT:  Leg Pain      HPI:  Marlon Dean is a 41 year old male patient admitted with Blood in stool [K92.1].  Gastroenterology Transfer of Care was requested for GI bleed. Patient brought to ER 3/18 from subacute facility with knee pain and fevers for 2 weeks. Found to be anemic (hgb 9) with melena on rectal exam.     Patient just discharged. He was hospitalized 3/9-3/16/22 for sepsis with strep bacteremia. Workup for source of bacteremia revealed only a possible small abscess of the plantar soft tissue of the left foot. Ortho recommended no surgical intervention. Likely odontogenic source. ID recommended discharge on oral abx. Mildly anemic during hospitalization with hgb dropping from 16 on admission to 10.1 on day of discharge. Today hgb 9.8, minimally lower. Ferritin 4300 on 3/9, no iron studies. Patient reports he takes 800 mg ibuprofen 3 times a day for 2 years for knee pain. He denies any GI symptoms. He is really hungry and hopes to eat soon. Never had an endoscopic procedure. No heartburn, dysphagia, h/o pud. Never noticed his stool was black. It is usually brown and once a day. Never sees blood. No abdominal surgeries. No family history of colon cancer.      HISTORIES:    ALLERGIES:  No Known Allergies  No current facility-administered medications on file prior to encounter.  insulin glargine (LANTUS) 100 UNIT/ML vial solution  insulin lispro 100 UNIT/ML scheduled dose AND correction dose  Jardiance 25 MG tablet  gabapentin (NEURONTIN) 100 MG capsule  ibuprofen (MOTRIN) 800 MG tablet  latanoprost (XALATAN) 0.005 % ophthalmic solution  nortriptyline (PAMELOR) 10 MG capsule  spironolactone (ALDACTONE) 100 MG tablet  metFORMIN (GLUCOPHAGE) 500 MG tablet  amoxicillin-clavulanate (Augmentin) 875-125 MG per tablet  nystatin (MYCOSTATIN) 123205 UNIT/GM powder  Acetaminophen Extra Strength 500 MG tablet  Trulicity 3 MG/0.5ML Solution Pen-injector  estradiol  (ESTRACE) 2 MG tablet      Current Facility-Administered Medications   Medication Dose Route Frequency Provider Last Rate Last Admin   • pantoprazole (PROTONIX) 80 mg/100mL in sodium chloride 0.9% infusion  8 mg/hr Intravenous Continuous Godfrey WOODWARD PA-C 10 mL/hr at 03/18/22 1407 8 mg/hr at 03/18/22 1407     Current Outpatient Medications   Medication Sig Dispense Refill   • insulin glargine (LANTUS) 100 UNIT/ML vial solution Inject 12 Units into the skin nightly. 10 mL 12   • insulin lispro 100 UNIT/ML scheduled dose AND correction dose Inject 24 Units into the skin 3 times daily (before meals). HOLD Scheduled Mealtime Dose if patient is not eating or NPO or Blood Sugar 80 mg/dL or less. If Patient is not eating or NPO, continue to GIVE Correction Dose according to scale below: Blood Sugar less than 150 mg/dL - Give 0 units of Correction Dose 150-199 mg/dL - GIVE 2 units of Correction Dose 200-249 mg/dL - GIVE 4 units of Correction Dose 250-299 mg/dL - GIVE 6 units of Correction Dose 300-349 mg/dL - GIVE 8 units of Correction Dose 350 mg/dL or greater - GIVE 10 units of Correction Dose and Notify MD 10 mL 0   • Jardiance 25 MG tablet Take 1 tablet by mouth daily.     • gabapentin (NEURONTIN) 100 MG capsule Take 1 capsule by mouth 2 times daily.     • ibuprofen (MOTRIN) 800 MG tablet Take 1 tablet by mouth 3 times daily.     • latanoprost (XALATAN) 0.005 % ophthalmic solution INSTILL 1 DROP IN EACH EYE EVERY NIGHT     • nortriptyline (PAMELOR) 10 MG capsule Take 10 mg by mouth at bedtime.     • spironolactone (ALDACTONE) 100 MG tablet Take 1 tablet by mouth 2 times daily.     • metFORMIN (GLUCOPHAGE) 500 MG tablet Take 1,000 mg by mouth 2 times daily before breakfast and at night.     • amoxicillin-clavulanate (Augmentin) 875-125 MG per tablet Take 1 tablet by mouth every 12 hours. Take with food. 20 tablet 0   • nystatin (MYCOSTATIN) 917182 UNIT/GM powder Apply topically every 12 hours. 30 g 0   •  Acetaminophen Extra Strength 500 MG tablet TAKE 1 TO 2 CAPLETS BY MOUTH THREE TIMES DAILY AS NEEDED     • Trulicity 3 MG/0.5ML Solution Pen-injector Inject 3 mg into the skin 1 day a week.     • estradiol (ESTRACE) 2 MG tablet TAKE 2 TABLETS BY MOUTH EVERY MORNING AND 1 TABLET EVERY EVENING         History reviewed. No pertinent past medical history.  History reviewed. No pertinent surgical history.  Social History     Tobacco Use   • Smoking status: Never Smoker   • Smokeless tobacco: Never Used   Vaping Use   • Vaping Use: Every day   Substance Use Topics   • Alcohol use: Never   • Drug use: Never     History reviewed. No pertinent family history.  Health Maintenance   Topic Date Due   • Pneumococcal Vaccine 0-64 (1 of 2 - PPSV23) Never done   • Diabetes Eye Exam  Never done   • Diabetes A1C  Never done   • Diabetes Foot Exam  Never done   • Hepatitis B Vaccine (1 of 3 - Risk 3-dose series) Never done   • DTaP/Tdap/Td Vaccine (1 - Tdap) Never done   • COVID-19 Vaccine (2 - Booster for Uzma series) 08/03/2021   • Influenza Vaccine (1) Never done   • DM/CKD Microalbumin  03/10/2023   • Depression Screening  03/11/2023   • DM/CKD GFR  03/18/2023   • Meningococcal Vaccine  Aged Out   • HPV Vaccine  Aged Out       REVIEW OF SYSTEMS:    Relevant positive and negative review of systems findings include:   Cardiovascular problems: no chest pain.   Respiratory problems: no shortness of breath.   Gastro-intestinal problems: As per history of present illness  Urinary problems: no dysuria or hematuria.  Musculoskeletal problems: Chronic knee pain.   Skin problems: no rash. Ulcer on left great toe. Cleans it every 2 days.   Neurological problems: no history of stroke.   Psychiatric problems: no recent problems with memory loss.  Hematologic and/or Lymphatic problems: no known bleeding disorder.  Constitutional: no recent weight loss.        OBJECTIVE:    PROBLEM LIST:    Patient Active Problem List   Diagnosis   • Severe  sepsis (CMS/HCC)   • Blood in stool         VITAL SIGNS:    Vital Last Value 24 Hour Range   Temperature (!) 100.8 °F (38.2 °C) (03/18/22 1000) Temp  Min: 100.8 °F (38.2 °C)  Max: 100.8 °F (38.2 °C)   Pulse (!) 102 (03/18/22 1326) Pulse  Min: 102  Max: 119   Respiratory 20 (03/18/22 1152) Resp  Min: 20  Max: 20   Non-Invasive  Blood Pressure 138/80 (03/18/22 1326) BP  Min: 132/80  Max: 149/73   Pulse Oximetry 92 % (03/18/22 1326) SpO2  Min: 92 %  Max: 98 %     Vital Today Admitted   Weight 133.5 kg (294 lb 5 oz) (03/18/22 1000) Weight: 133.5 kg (294 lb 5 oz) (03/18/22 1000)   Height N/A Height: 6' 1\" (185.4 cm) (03/18/22 1000)   BMI N/A BMI (Calculated): 38.83 (03/18/22 1000)     INTAKE/OUTPUT:    No intake or output data in the 24 hours ending 03/18/22 1453     PHYSICAL EXAM:    1. General: The patient is in no acute distress, appears stated age. Normocephalic, atraumatic.  2. Eyes: Sclerae are anicteric.  3. ENT: Appears to be wearing upper plate.   4. Neck: Symmetric without edema or mass.   5. Respiratory: Respiratory effort normal, Clear anterior breath sounds w/o crackles/wheezes  6. Cardiovascular: Regular rate and rhythm.  No pretibial edema.  7. Gastrointestinal: Rounded, non-tender abdomen with normoactive bowel sounds. No palpable mass. No guarding or rebound tenderness.   8. Skin: No rash or jaundice. Warm and dry.  9. Psychiatric: Awake, alert, oriented, normal mood, judgment and insight.  10. Neurologic:  Normal speech, no gross tremor, no obvious motor deficits.       LABORATORY DATA:    Recent Labs   Lab 03/18/22  1008 03/18/22  1002 03/16/22  0442 03/16/22  0037 03/15/22  1248 03/15/22  0603 03/14/22  1156 03/14/22  0545 03/13/22  1151 03/13/22  0546   WBC  --  12.8* 12.6*  --   --  13.2*  --  12.6*  --  13.7*   RBC  --  3.01* 3.15*  --   --  3.39*  --  3.74*  --  3.94*   HCT  --  28.1* 29.3*  --   --  31.1*  --  34.2*  --  35.6*   HGB  --  9.8* 10.1*  --   --  10.8*  --  12.2*  --  12.6*   PLT  --   334 226  --   --  190  --  149  --  101*   MCV  --  93.4 93.0  --   --  91.7  --  91.4  --  90.4   SODIUM  --  124* 131*  131* 129*   < > 130*   < > 130*   < > 129*   POTASSIUM  --  4.7 4.2  --   --  4.5  --  4.4  --  4.0   CHLORIDE  --  94* 99  --   --  100  --  101  --  100   CO2  --  22 24  --   --  24  --  23  --  20*   BUN  --  12 27*  --   --  27*  --  22*  --  20   CREATININE 0.30* 0.44* 0.47*  --   --  0.54*  --  0.51*  --  0.58*   BILIRUBIN  --  0.6  --   --   --   --   --  1.0  --  1.0   AST  --  46*  --   --   --   --   --  25  --  44*   GPT  --  34  --   --   --   --   --  24  --  41   ALBUMIN  --  1.5*  --   --   --   --   --  1.3*  --  1.3*   ALKPT  --  84  --   --   --   --   --  87  --  103    < > = values in this interval not displayed.     Component      Latest Ref Rng & Units 3/9/2022 3/10/2022   HGB      13.0 - 17.0 g/dL 16.5 13.9     Component      Latest Ref Rng & Units 3/11/2022 3/11/2022 3/12/2022   HGB      13.0 - 17.0 g/dL 12.9 (L) 12.8 (L) 12.6 (L)     Component      Latest Ref Rng & Units 3/13/2022 3/14/2022 3/15/2022 3/16/2022   HGB      13.0 - 17.0 g/dL 12.6 (L) 12.2 (L) 10.8 (L) 10.1 (L)     Component      Latest Ref Rng & Units 3/18/2022   HGB      13.0 - 17.0 g/dL 9.8 (L)     TSH (mcUnits/mL)   Date Value   03/09/2022 0.245 (L)     Component      Latest Ref Rng & Units 3/9/2022   Ferritin      26 - 388 ng/mL 4,350 (H)     Labs Reviewed   COMPREHENSIVE METABOLIC PANEL - Abnormal; Notable for the following components:       Result Value    Sodium 124 (*)     Chloride 94 (*)     Glucose 353 (*)     Creatinine 0.44 (*)     BUN/ Creatinine Ratio 27 (*)     Calcium 7.8 (*)     GOT/AST 46 (*)     Albumin 1.5 (*)     Globulin 5.6 (*)     A/G Ratio 0.3 (*)     All other components within normal limits   URINALYSIS & REFLEX MICROSCOPY WITH CULTURE IF INDICATED - Abnormal; Notable for the following components:    GLUCOSE, URINALYSIS >=500 (*)     KETONES, URINALYSIS 20  (*)     UROBILINOGEN,  URINALYSIS 2.0 (*)     All other components within normal limits   CBC WITH AUTOMATED DIFFERENTIAL (PERFORMABLE ONLY) - Abnormal; Notable for the following components:    WBC 12.8 (*)     RBC 3.01 (*)     HGB 9.8 (*)     HCT 28.1 (*)     Absolute Neutrophils 10.9 (*)     All other components within normal limits    Narrative:     This is an appended report.  These results have been appended to a previously verified report.   ISTAT CHEM8 - POINT OF CARE - Abnormal; Notable for the following components:    SODIUM - POINT OF CARE 127 (*)     CHLORIDE - POINT OF CARE 95 (*)     HEMATOCRIT - POINT OF CARE 29.0 (*)     HEMOGLOBIN - POINT OF CARE 9.9 (*)     GLUCOSE - POINT OF CARE 383 (*)     CALCIUM, IONIZED - POINT OF CARE 1.12 (*)     Creatinine 0.30 (*)     All other components within normal limits   NT PROBNP - Normal   MAGNESIUM - Normal   LACTIC ACID, VENOUS - POINT OF CARE - Normal   CBC WITH DIFFERENTIAL    Narrative:     The following orders were created for panel order CBC with Automated Differential.                  Procedure                               Abnormality         Status                                     ---------                               -----------         ------                                     CBC with Automated Dif...[[id number]]  Abnormal            Final result                                                 Please view results for these tests on the individual orders.   RAINBOW DRAW    Narrative:     The following orders were created for panel order [place] Draw Light Blue Top Collected? 1 Label; Red Top Collected? No Labels; Light Green Top Collected? 1 Label; Lavender Top Collected? 1 Label; Gold Top Collected? 1 Label; Pink Top Collected? No Labels; Grey Top Collected? No Labels.                  Procedure                               Abnormality         Status                                     ---------                               -----------         ------                                      Light Blue Top[[id number]]                                 In process                                 Light Green Top[[id number]]                                In process                                 Lavender Top[[id number]]                                   In process                                 Gold Top[[id number]]                                       In process                                                   Please view results for these tests on the individual orders.   LIGHT BLUE TOP   LIGHT GREEN TOP   LAVENDER TOP   GOLD TOP   POCT STOOL OCCULT BLOOD   RAPID SARS-COV-2 BY PCR   POCT ED ISTAT-8   POCT LACTATE

## 2023-09-05 NOTE — TELEPHONE ENCOUNTER
PT CALLED STATING SHE HAD SOME THYROID LABS DRAWN AT HER PCP [facility]/ [facility] IN [place]. SHE REQUESTED DR HAYES LOOK THEM OVER AND HAVE SOMEONE REACH OUT TO HER.

## 2023-09-07 NOTE — TELEPHONE ENCOUNTER
Pt states she did not ever take the extra 1/2 tablet weekly (because she forgot) so she was advised to take one tablet every day except only take 1/2 tablet on Sundays and repeat labs in 8 weeks  Pt voiced understanding    Please create lab order

## 2023-09-09 RX ORDER — LEVOTHYROXINE SODIUM 0.15 MG/1
TABLET ORAL
Qty: 84 TABLET | Refills: 1 | Status: SHIPPED | OUTPATIENT
Start: 2023-09-09
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
## 2023-09-19 ENCOUNTER — TELEMEDICINE (OUTPATIENT)
Dept: SLEEP MEDICINE | Facility: HOSPITAL | Age: 33
End: 2023-09-19
Payer: COMMERCIAL

## 2023-09-19 VITALS — HEIGHT: 65 IN | WEIGHT: 173 LBS | BODY MASS INDEX: 28.82 KG/M2

## 2023-09-19 DIAGNOSIS — R29.818 SUSPECTED SLEEP APNEA: ICD-10-CM

## 2023-09-19 DIAGNOSIS — R06.83 SNORING: Primary | ICD-10-CM

## 2023-09-19 DIAGNOSIS — G47.19 EXCESSIVE DAYTIME SLEEPINESS: ICD-10-CM

## 2023-10-10 ENCOUNTER — TELEPHONE (OUTPATIENT)
Dept: SLEEP MEDICINE | Facility: HOSPITAL | Age: 33
End: 2023-10-10
Payer: COMMERCIAL

## 2023-10-10 NOTE — TELEPHONE ENCOUNTER
Patient had a failed study 10/03/23. Insufficient data was collected. Patient will need to be rescheduled for a Raaceli Night One Type III or a In Lab Study.     Please contact the scheduling department to reschedule [phone]       CALLED PATIENT TO ADVISE OF ABOVE SLEEP STUDY RESULTS.REACHED  AND LEFT MESSAGE REQUESTING RETURN CALL

## 2023-10-16 RX ORDER — LEVOTHYROXINE SODIUM 137 UG/1
137 TABLET ORAL EVERY MORNING
Qty: 90 TABLET | Refills: 1 | OUTPATIENT
Start: 2023-10-16

## 2023-10-16 NOTE — TELEPHONE ENCOUNTER
Refill was changed to 150 mcg by GRICELDA.    Last office visit with prescribing clinician: 6/19/2023       Next office visit with prescribing clinician: 12/18/2023     {    Gabi Diaz MA  10/16/23, 10:46 EDT

## 2023-10-19 ENCOUNTER — TELEPHONE (OUTPATIENT)
Dept: ENDOCRINOLOGY | Facility: CLINIC | Age: 33
End: 2023-10-19
Payer: COMMERCIAL

## 2023-10-19 RX ORDER — LEVOTHYROXINE SODIUM 0.15 MG/1
TABLET ORAL
Qty: 102 TABLET | Refills: 1 | Status: SHIPPED | OUTPATIENT
Start: 2023-10-19

## 2023-10-19 NOTE — TELEPHONE ENCOUNTER
PT CALLED STATING SHE IS OUT OF MEDICATION. PT REQUESTED RX FOR LEVOTHYROXINE TO BE SENT IN TO [facility] IN [place], [place]. PT IS LEAVING FOR VACATION TOMORROW.

## 2023-10-19 NOTE — TELEPHONE ENCOUNTER
Rx Refill Note    Requested Prescriptions      No prescriptions requested or ordered in this encounter        Last office visit with prescribing clinician: 6/19/2023     Next office visit with prescribing clinician: 12/18/2023   {

## 2023-11-01 ENCOUNTER — OFFICE VISIT (OUTPATIENT)
Dept: FAMILY MEDICINE CLINIC | Facility: CLINIC | Age: 33
End: 2023-11-01
Payer: COMMERCIAL

## 2023-11-01 VITALS
DIASTOLIC BLOOD PRESSURE: 78 MMHG | SYSTOLIC BLOOD PRESSURE: 116 MMHG | BODY MASS INDEX: 29.52 KG/M2 | HEIGHT: 65 IN | RESPIRATION RATE: 16 BRPM | TEMPERATURE: 98.2 F | OXYGEN SATURATION: 99 % | WEIGHT: 177.2 LBS | HEART RATE: 76 BPM

## 2023-11-01 DIAGNOSIS — F41.9 ANXIETY: ICD-10-CM

## 2023-11-01 DIAGNOSIS — R09.81 SINUS CONGESTION: Primary | ICD-10-CM

## 2023-11-01 DIAGNOSIS — K42.9 UMBILICAL HERNIA WITHOUT OBSTRUCTION AND WITHOUT GANGRENE: ICD-10-CM

## 2023-11-01 PROCEDURE — 99214 OFFICE O/P EST MOD 30 MIN: CPT | Performed by: FAMILY MEDICINE

## 2023-11-01 RX ORDER — HYDROXYZINE HYDROCHLORIDE 25 MG/1
12.5-25 TABLET, FILM COATED ORAL EVERY 6 HOURS PRN
Qty: 60 TABLET | Refills: 2 | Status: SHIPPED | OUTPATIENT
Start: 2023-11-01

## 2023-11-01 NOTE — PROGRESS NOTES
Chief Complaint  Nasal Congestion (Right nostril, hasn't been clear for 3mo+, feels pressure and full in right ear.) and Hernia (Umbilical hernia, hasn't really ever bothered her until recently, x3wks, almost thought it was appendicitis due to the amount of pain at the time it started, comes and goes.)    Subjective          Becky Naqvi presents to [facility]    Right sided nasal congestion and sinus fullness x 2 months. Intermittent pain in right ear, but she also knows that she has TMJ dysfunction on the right side.   Takes prn Sudafed, but causes increased heart rate, so she doesn't take often. She takes chlorpheniramine/phenylephrine occasionally, along with ibuprofen.   Denies rhinorrhea. She does have PND related to allergies. Takes zyrtec, but not consistently. She tried OTC Flonase, but noticed that it caused her to feel jittery.   She saw ENT years ago, was told that she has narrow turbinates.     Umbilical hernia  She was diagnosed 3-4 years ago with hernia  She reports no issues, until 3 weeks ago. She felt sharp pain in umbilical area. Symptoms have been intermittent since then.   Denies fever, nausea, vomiting.     Needs hydroxyzine refill.     The following portions of the patient's history were reviewed and updated as appropriate: allergies, current medications, past family history, past medical history, past social history, past surgical history, and problem list.    Objective      Physical Exam  Vitals reviewed.   HENT:      Right Ear: Tympanic membrane, ear canal and external ear normal.      Left Ear: Tympanic membrane, ear canal and external ear normal.      Nose:      Right Turbinates: Enlarged and swollen.   Cardiovascular:      Rate and Rhythm: Normal rate.      Heart sounds: Normal heart sounds.   Pulmonary:      Effort: Pulmonary effort is normal.      Breath sounds: Normal breath sounds.   Neurological:      Mental Status: She is alert.        Result  Review :                Assessment and Plan    Diagnoses and all orders for this visit:    1. Sinus congestion (Primary)  -     Ambulatory Referral to ENT (Otolaryngology)    2. Anxiety  -     hydrOXYzine (ATARAX) 25 MG tablet; Take 0.5-1 tablets by mouth Every 6 (Six) Hours As Needed for Anxiety.  Dispense: 60 tablet; Refill: 2    3. Umbilical hernia without obstruction and without gangrene  -     Cancel: Ambulatory Referral to General Surgery  -     Ambulatory Referral to General Surgery        Follow Up   No follow-ups on file.  Patient was given instructions and counseling regarding her condition or for health maintenance advice. Please see specific information pulled into the AVS if appropriate.

## 2023-12-13 ENCOUNTER — TELEPHONE (OUTPATIENT)
Dept: FAMILY MEDICINE CLINIC | Facility: CLINIC | Age: 33
End: 2023-12-13
Payer: COMMERCIAL

## 2023-12-13 NOTE — TELEPHONE ENCOUNTER
Pt had a difficult BM this morning and noticed some slight streaking of blood on tissue and possibly some in stool. She went again later and did not notice anything. I informed pt to continue to watch it but most likely from straining. She voiced understanding.

## 2023-12-13 NOTE — TELEPHONE ENCOUNTER
Caller: Primo Naqvi    Relationship: Self    Best call back number: [phone]    What is the best time to reach you: ANYTIME    Who are you requesting to speak with (clinical staff, provider,  specific staff member): DR DE LA CRUZ OR CLINICAL STAFF IF NOT AVAILABLE    Do you know the name of the person who called: PATIENT/ PRIMO    What was the call regarding: BLOOD IN STOOL    Is it okay if the provider responds through MyChart:

## 2024-01-15 DIAGNOSIS — F41.9 ANXIETY: ICD-10-CM

## 2024-01-29 ENCOUNTER — TELEPHONE (OUTPATIENT)
Dept: SLEEP MEDICINE | Facility: HOSPITAL | Age: 34
End: 2024-01-29
Payer: COMMERCIAL

## 2024-01-29 DIAGNOSIS — R06.83 SNORING: Primary | ICD-10-CM

## 2024-01-29 DIAGNOSIS — G47.19 EXCESSIVE DAYTIME SLEEPINESS: ICD-10-CM

## 2024-01-29 DIAGNOSIS — R29.818 SUSPECTED SLEEP APNEA: ICD-10-CM

## 2024-01-29 NOTE — TELEPHONE ENCOUNTER
PATIENT CALLED ASKING THAT WE SEND A REPEAT HST TO [facility] FOR PROCESSING.    PATIENT SAID THAT HST EQUIP DID NOT FUNCTION (TURN ON).      DUE TO HST FAILURE, LAB RECOMMENDED THAT PT HAVE IN LAB STUDY.      PATIENT DOES NOT WANT DO DO AN INLAB STUDY STATING THAT THERE WAS NOT ANY DATA AVAILABLE TO REVIEW ON THE HST EQUIP.    PLEASE ORDER NEW HST AND ADVISE WHEN IN EPIC.

## 2024-02-01 ENCOUNTER — OFFICE VISIT (OUTPATIENT)
Dept: CARDIOLOGY | Facility: CLINIC | Age: 34
End: 2024-02-01
Payer: COMMERCIAL

## 2024-02-01 VITALS
SYSTOLIC BLOOD PRESSURE: 122 MMHG | DIASTOLIC BLOOD PRESSURE: 86 MMHG | BODY MASS INDEX: 30.16 KG/M2 | WEIGHT: 181 LBS | HEIGHT: 65 IN | OXYGEN SATURATION: 98 % | HEART RATE: 77 BPM

## 2024-02-01 DIAGNOSIS — R94.31 ABNORMAL EKG: ICD-10-CM

## 2024-02-01 DIAGNOSIS — G47.19 EXCESSIVE DAYTIME SLEEPINESS: ICD-10-CM

## 2024-02-01 DIAGNOSIS — R00.2 PALPITATIONS: ICD-10-CM

## 2024-02-01 DIAGNOSIS — R06.83 SNORING: Primary | ICD-10-CM

## 2024-02-01 NOTE — ASSESSMENT & PLAN NOTE
EKG today showed sinus rhythm with a rate of 86 bpm, possible left atrial enlargement.    -Echocardiogram for further evaluation
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
## 2024-02-01 NOTE — ASSESSMENT & PLAN NOTE
Occasional palpitations and tachycardia- suspect that it is related to untreated CHARLES. Most of the episodes occur at night.

## 2024-02-02 ENCOUNTER — CLINICAL SUPPORT (OUTPATIENT)
Dept: FAMILY MEDICINE CLINIC | Facility: CLINIC | Age: 34
End: 2024-02-02
Payer: COMMERCIAL

## 2024-02-02 DIAGNOSIS — E89.0 POSTSURGICAL HYPOTHYROIDISM: Primary | ICD-10-CM

## 2024-02-02 PROCEDURE — 36415 COLL VENOUS BLD VENIPUNCTURE: CPT | Performed by: NURSE PRACTITIONER

## 2024-02-02 NOTE — PROGRESS NOTES
Venipuncture Blood Specimen Collection  Venipuncture performed in left arm by Heike Burciaga MA with good hemostasis. Patient tolerated the procedure well without complications.   02/02/24   Heike Burciaga MA

## 2024-02-07 DIAGNOSIS — R06.83 SNORING: ICD-10-CM

## 2024-02-07 DIAGNOSIS — G47.19 EXCESSIVE DAYTIME SLEEPINESS: ICD-10-CM

## 2024-02-09 DIAGNOSIS — R06.83 SNORING: Primary | ICD-10-CM

## 2024-02-09 DIAGNOSIS — G47.19 EXCESSIVE DAYTIME SLEEPINESS: ICD-10-CM

## 2024-02-09 NOTE — PROGRESS NOTES
Patient recently completed a home sleep study that was non-diagnostic. Results reviewed in detail with patient. She continues to experience excessive daytime sleepiness and loud snoring. She also has episodes where she wakes up gasping for air with her heart racing. We discussed an in lab PSG for definitive diagnosis and she is agreeable.

## 2024-02-27 ENCOUNTER — OFFICE VISIT (OUTPATIENT)
Dept: FAMILY MEDICINE CLINIC | Facility: CLINIC | Age: 34
End: 2024-02-27
Payer: COMMERCIAL

## 2024-02-27 VITALS
HEART RATE: 106 BPM | DIASTOLIC BLOOD PRESSURE: 62 MMHG | OXYGEN SATURATION: 99 % | RESPIRATION RATE: 14 BRPM | TEMPERATURE: 98 F | WEIGHT: 180 LBS | SYSTOLIC BLOOD PRESSURE: 102 MMHG | HEIGHT: 65 IN | BODY MASS INDEX: 29.99 KG/M2

## 2024-02-27 DIAGNOSIS — R52 BODY ACHES: ICD-10-CM

## 2024-02-27 DIAGNOSIS — J10.1 INFLUENZA B: Primary | ICD-10-CM

## 2024-02-27 DIAGNOSIS — R50.9 FEVER, UNSPECIFIED FEVER CAUSE: ICD-10-CM

## 2024-02-27 DIAGNOSIS — R05.1 ACUTE COUGH: ICD-10-CM

## 2024-02-27 LAB
EXPIRATION DATE: ABNORMAL
FLUAV AG UPPER RESP QL IA.RAPID: NOT DETECTED
FLUBV AG UPPER RESP QL IA.RAPID: DETECTED
INTERNAL CONTROL: ABNORMAL
Lab: ABNORMAL
SARS-COV-2 AG UPPER RESP QL IA.RAPID: NOT DETECTED

## 2024-02-27 PROCEDURE — 99213 OFFICE O/P EST LOW 20 MIN: CPT | Performed by: NURSE PRACTITIONER

## 2024-02-27 PROCEDURE — 87428 SARSCOV & INF VIR A&B AG IA: CPT | Performed by: NURSE PRACTITIONER

## 2024-02-27 RX ORDER — CHLORCYCLIZINE HYDROCHLORIDE AND PSEUDOEPHEDRINE HYDROCHLORIDE 25; 60 MG/1; MG/1
1 TABLET ORAL 3 TIMES DAILY
Qty: 21 TABLET | Refills: 0 | Status: SHIPPED | OUTPATIENT
Start: 2024-02-27

## 2024-02-27 NOTE — PROGRESS NOTES
Date: 2024   Patient Name: Becky Naqvi  : 1990   MRN: [id number]     Chief Complaint:    Chief Complaint   Patient presents with    Flu Symptoms     Cough started . By , she was feeling like she had the flu.       History of Present Illness: Becky Naqvi is a 34 y.o. female who is here today for Cough on Thursday and then chills, body aches, fatigue, fever, nausea, diarrhea that worsened on    Sinex, extra strength tylenol and ibuprofen without relief of symptoms.   No other sick encounters    Flu Symptoms  Associated symptoms include congestion, coughing, fatigue, a fever, nausea and a sore throat. Pertinent negatives include no abdominal pain, chest pain, chills or myalgias.            Review of Systems:   Review of Systems   Constitutional:  Positive for fatigue and fever. Negative for chills.   HENT:  Positive for congestion, postnasal drip, rhinorrhea, sinus pressure and sore throat. Negative for ear pain.    Respiratory:  Positive for cough. Negative for shortness of breath and wheezing.    Cardiovascular:  Negative for chest pain.   Gastrointestinal:  Positive for diarrhea and nausea. Negative for abdominal pain.   Musculoskeletal:  Negative for myalgias.   Neurological:  Negative for headache.       Past Medical History:   Past Medical History:   Diagnosis Date    Allergic     Anemia     In the past    Anxiety     Genital warts     GERD (gastroesophageal reflux disease)     Graves disease     Headache     hormonal/tension    Hyperthyroidism 2014    Inflammatory bowel disease 3/2016    colitis    Umbilical hernia 10/2019    Wears glasses        Past Surgical History:   Past Surgical History:   Procedure Laterality Date     SECTION      2016     SECTION N/A 2018    Procedure:  SECTION REPEAT * 40 WKS *;  Surgeon: Becky Lobo MD;  Location: [facility];  Service: Obstetrics/Gynecology    COLONOSCOPY      KNEE ACL  RECONSTRUCTION      KNEE ARTHROSCOPY W/ MENISCAL REPAIR      THYROIDECTOMY N/A 3/11/2022    Procedure: TOTAL THYROIDECTOMY;  Surgeon: Bear Sharpe MD;  Location: [facility];  Service: General;  Laterality: N/A;    WISDOM TOOTH EXTRACTION         Family History:   Family History   Problem Relation Age of Onset    Hypertension Father     Arthritis Father     Hyperlipidemia Father     Alcohol abuse Father     COPD Father     Graves' disease Mother     Arthritis Mother     Thyroid disease Mother     Arthritis Paternal Grandfather     Lung cancer Paternal Grandmother     Arthritis Paternal Grandmother     Heart disease Paternal Grandmother     COPD Paternal Grandmother     Cancer Paternal Grandmother     Heart attack Maternal Grandmother     Arthritis Maternal Grandmother     Diabetes Maternal Grandmother     Heart disease Maternal Grandmother     Arthritis Maternal Grandfather     Cancer Maternal Grandfather     Lung cancer Paternal Uncle     Cancer Paternal Uncle     Breast cancer Neg Hx     Ovarian cancer Neg Hx     Uterine cancer Neg Hx     Colon cancer Neg Hx        Social History:   Social History     Socioeconomic History    Marital status:    Tobacco Use    Smoking status: Never    Smokeless tobacco: Never   Vaping Use    Vaping Use: Never used   Substance and Sexual Activity    Alcohol use: Yes     Alcohol/week: 2.0 standard drinks of alcohol     Types: 2 Cans of beer per week     Comment: Socially, less than weekly    Drug use: Never    Sexual activity: Yes     Partners: Male     Birth control/protection: Condom       Medications:     Current Outpatient Medications:     hydrOXYzine (ATARAX) 25 MG tablet, Take 0.5-1 tablets by mouth Every 6 (Six) Hours As Needed for Anxiety., Disp: 60 tablet, Rfl: 2    levothyroxine (SYNTHROID, LEVOTHROID) 150 MCG tablet, Take one tablet PO q day and one and one/half tablets on Sunday, Disp: 102 tablet, Rfl: 1    sertraline (ZOLOFT) 50 MG tablet, TAKE 1 TABLET BY  "MOUTH EVERY DAY, Disp: 90 tablet, Rfl: 1    Chlorcyclizine-Pseudoephed (Stahist AD) 25-60 MG tablet, Take 1 tablet by mouth 3 times a day., Disp: 21 tablet, Rfl: 0    Allergies:   Allergies   Allergen Reactions    Oxycodone-Acetaminophen Itching    Propylthiouracil Other (See Comments)     Neutropenia         Physical Exam:  Vital Signs:   Vitals:    02/27/24 0934   BP: 102/62   Pulse: 106   Resp: 14   Temp: 98 °F (36.7 °C)   SpO2: 99%   Weight: 81.6 kg (180 lb)   Height: 165.1 cm (65\")     Body mass index is 29.95 kg/m².     Physical Exam  Vitals and nursing note reviewed.   Constitutional:       Appearance: She is not ill-appearing.   HENT:      Head: Normocephalic and atraumatic.      Right Ear: External ear normal. No middle ear effusion. Tympanic membrane is not erythematous or bulging.      Left Ear: External ear normal.  No middle ear effusion. Tympanic membrane is not erythematous or bulging.      Nose: Nasal tenderness and congestion present.      Right Turbinates: Not enlarged or swollen.      Left Turbinates: Not enlarged or swollen.      Right Sinus: No maxillary sinus tenderness.      Left Sinus: No maxillary sinus tenderness.      Mouth/Throat:      Mouth: Mucous membranes are moist.      Pharynx: No pharyngeal swelling or posterior oropharyngeal erythema.      Tonsils: No tonsillar exudate.   Eyes:      Pupils: Pupils are equal, round, and reactive to light.   Cardiovascular:      Rate and Rhythm: Normal rate and regular rhythm.   Pulmonary:      Effort: Pulmonary effort is normal.      Breath sounds: Normal breath sounds.   Abdominal:      General: Bowel sounds are normal.   Musculoskeletal:      Cervical back: Normal range of motion and neck supple.   Skin:     General: Skin is warm.   Neurological:      General: No focal deficit present.      Mental Status: She is alert and oriented to person, place, and time.   Psychiatric:         Mood and Affect: Mood normal.           Assessment/Plan: "   Diagnoses and all orders for this visit:    1. Influenza B (Primary)  -     Chlorcyclizine-Pseudoephed (Stahist AD) 25-60 MG tablet; Take 1 tablet by mouth 3 times a day.  Dispense: 21 tablet; Refill: 0    2. Body aches  -     POCT SARS-CoV-2 Antigen LUCAS + Flu    3. Fever, unspecified fever cause  -     POCT SARS-CoV-2 Antigen LUCAS + Flu    4. Acute cough  -     POCT SARS-CoV-2 Antigen LUCAS + Flu       Educated on viral versus bacterial infection.  At this time treating for virus.  Positive for influenza b  Increase fluid intake  Take medication as prescribed  Monitor for worsening symptoms  Tylenol and ibuprofen as needed for aches and fever.  Go to the ER for any shortness of breath, chest pains, fever uncontrolled by medications.      Follow Up:   Return if symptoms worsen or fail to improve.    Cyn Link. JOAQUIN   [facility]

## 2024-03-02 ENCOUNTER — DOCUMENTATION (OUTPATIENT)
Dept: CARDIOLOGY | Facility: CLINIC | Age: 34
End: 2024-03-02
Payer: COMMERCIAL

## 2024-03-02 RX ORDER — ALBUTEROL SULFATE 90 UG/1
2 AEROSOL, METERED RESPIRATORY (INHALATION) EVERY 4 HOURS PRN
Qty: 1 G | Refills: 1 | Status: SHIPPED | OUTPATIENT
Start: 2024-03-02

## 2024-04-01 ENCOUNTER — OFFICE VISIT (OUTPATIENT)
Dept: ENDOCRINOLOGY | Facility: CLINIC | Age: 34
End: 2024-04-01
Payer: COMMERCIAL

## 2024-04-01 VITALS
WEIGHT: 175.6 LBS | SYSTOLIC BLOOD PRESSURE: 124 MMHG | HEIGHT: 65 IN | DIASTOLIC BLOOD PRESSURE: 62 MMHG | HEART RATE: 74 BPM | BODY MASS INDEX: 29.26 KG/M2 | OXYGEN SATURATION: 99 %

## 2024-04-01 DIAGNOSIS — F41.9 ANXIETY: ICD-10-CM

## 2024-04-01 DIAGNOSIS — E89.0 POSTSURGICAL HYPOTHYROIDISM: Primary | ICD-10-CM

## 2024-04-01 PROCEDURE — 99214 OFFICE O/P EST MOD 30 MIN: CPT | Performed by: INTERNAL MEDICINE

## 2024-04-01 RX ORDER — CLONAZEPAM 0.5 MG/1
0.25 TABLET ORAL 2 TIMES DAILY PRN
Qty: 5 TABLET | Refills: 0 | Status: SHIPPED | OUTPATIENT
Start: 2024-04-01

## 2024-04-01 NOTE — PROGRESS NOTES
Becky Naqvi 34 y.o.  CC:Follow-up and Hypothyroidism    [place]:Follow-up and Hypothyroidism    Had lab work  with normal tfts  Has had multiple URI - flu/strep, etc since   Discussed lab work with T4 1.6 and TSH 1.2   Is having more situational anxiety - getting ready to fly and worried about getting too anxious   Hydroxyzine causes severe fatigue, trouble functioning  Discussed options for management including lower dose hydroxyzine (1/2-1/4 dose) or low dose alprazolam  Recommended try prior to anxiety provoking event to avoid paradoxical response     Allergies   Allergen Reactions    Oxycodone-Acetaminophen Itching    Propylthiouracil Other (See Comments)     Neutropenia       Current Outpatient Medications:     albuterol sulfate  (90 Base) MCG/ACT inhaler, Inhale 2 puffs Every 4 (Four) Hours As Needed for Wheezing., Disp: 1 g, Rfl: 1    hydrOXYzine (ATARAX) 25 MG tablet, Take 0.5-1 tablets by mouth Every 6 (Six) Hours As Needed for Anxiety., Disp: 60 tablet, Rfl: 2    levothyroxine (SYNTHROID, LEVOTHROID) 150 MCG tablet, Take one tablet PO q day and one and one/half tablets on , Disp: 102 tablet, Rfl: 1    sertraline (ZOLOFT) 50 MG tablet, TAKE 1 TABLET BY MOUTH EVERY DAY, Disp: 90 tablet, Rfl: 1  Patient Active Problem List    Diagnosis     Excessive daytime sleepiness [G47.19]     Palpitations [R00.2]     Abnormal EKG [R94.31]     Snoring [R06.83]     Anxiety [F41.9]     Menometrorrhagia [N92.1]     Postsurgical hypothyroidism [E89.0]     Muscle cramps [R25.2]     Fatigue [R53.83]     Graves disease [E05.00]     Tremor [R25.1]     Abnormal imaging of thyroid [R93.89]     Delivered by  section [Z38.01]     Anemia [D64.9]     Ganglion of hand [M67.449]     Gastroesophageal reflux disease [K21.9]     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   HENT:  Negative for congestion and rhinorrhea.    Eyes:  Negative for visual disturbance.   Respiratory:  Negative for cough and shortness of breath.    Cardiovascular:  Negative for palpitations and leg swelling.   Gastrointestinal:  Negative for constipation, diarrhea and nausea.   Genitourinary:  Negative for hematuria.   Musculoskeletal:  Negative for arthralgias, back pain, gait problem, joint swelling and myalgias.   Skin:  Negative for color change, rash and wound.   Allergic/Immunologic: Negative for environmental allergies, food allergies and immunocompromised state.   Neurological:  Negative for dizziness, weakness and light-headedness.   Psychiatric/Behavioral:  Negative for confusion, decreased concentration, dysphoric mood and sleep disturbance. The patient is nervous/anxious.      Social History     Socioeconomic History    Marital status:    Tobacco Use    Smoking status: Never    Smokeless tobacco: Never   Vaping Use    Vaping status: Never Used   Substance and Sexual Activity    Alcohol use: Yes     Alcohol/week: 2.0 standard drinks of alcohol     Types: 2 Cans of beer per week     Comment: Socially, less than weekly    Drug use: Never    Sexual activity: Yes     Partners: Male     Birth control/protection: Condom     Family History   Problem Relation Age of Onset    Hypertension Father     Arthritis Father     Hyperlipidemia Father     Alcohol abuse Father     COPD Father     Graves' disease Mother     Arthritis Mother     Thyroid disease Mother     Arthritis Paternal Grandfather     Lung cancer Paternal Grandmother     Arthritis Paternal Grandmother     Heart disease Paternal Grandmother     COPD Paternal Grandmother     Cancer Paternal Grandmother     Heart attack Maternal Grandmother     Arthritis Maternal Grandmother     Diabetes Maternal Grandmother     Heart disease Maternal  "Grandmother     Arthritis Maternal Grandfather     Cancer Maternal Grandfather     Lung cancer Paternal Uncle     Cancer Paternal Uncle     Breast cancer Neg Hx     Ovarian cancer Neg Hx     Uterine cancer Neg Hx     Colon cancer Neg Hx      /62   Pulse 74   Ht 165.1 cm (65\")   Wt 79.7 kg (175 lb 9.6 oz)   SpO2 99%   BMI 29.22 kg/m²   Physical Exam  Vitals and nursing note reviewed.   Constitutional:       Appearance: Normal appearance. She is well-developed.   HENT:      Head: Normocephalic and atraumatic.   Eyes:      General: Lids are normal.      Extraocular Movements: Extraocular movements intact.      Conjunctiva/sclera: Conjunctivae normal.      Pupils: Pupils are equal, round, and reactive to light.   Neck:      Thyroid: No thyroid mass or thyromegaly.      Vascular: No carotid bruit.      Trachea: Trachea normal. No tracheal deviation.   Cardiovascular:      Rate and Rhythm: Normal rate and regular rhythm.      Pulses: Normal pulses.      Heart sounds: Normal heart sounds. No murmur heard.     No friction rub. No gallop.   Pulmonary:      Effort: Pulmonary effort is normal. No respiratory distress.      Breath sounds: Normal breath sounds. No wheezing.   Musculoskeletal:         General: No deformity. Normal range of motion.      Cervical back: Normal range of motion and neck supple.   Lymphadenopathy:      Cervical: No cervical adenopathy.   Skin:     General: Skin is warm and dry.      Findings: No erythema or rash.      Nails: There is no clubbing.   Neurological:      Mental Status: She is alert and oriented to person, place, and time.      Cranial Nerves: No cranial nerve deficit.      Deep Tendon Reflexes: Reflexes are normal and symmetric. Reflexes normal.   Psychiatric:         Speech: Speech normal.         Behavior: Behavior normal.         Thought Content: Thought content normal.         Judgment: Judgment normal.       Results for orders placed or performed in visit on 02/27/24   POCT " SARS-CoV-2 Antigen LUCAS + Flu    Specimen: Swab   Result Value Ref Range    SARS Antigen Not Detected Not Detected, Presumptive Negative    Influenza A Antigen LUCAS Not Detected (A) Not Detected    Influenza B Antigen LUCAS Detected (A) Not Detected    Internal Control Passed Passed    Lot Number [id number]     Expiration Date 2025-01-17      Diagnoses and all orders for this visit:    1. Postsurgical hypothyroidism (Primary)  Assessment & Plan:  With normal T4 and TSH 2/24  Continue synthroid 150 mcg daily         2. Anxiety  Assessment & Plan:  Doing well day to day with zoloft  Does not want to titrate  Discussed options for management of escalation / situational flare  Ok try lower dose atarax   clonazepam 0.25 mg for severe anxiety / panic prn       Return in about 1 year (around 4/1/2025) for Recheck.    Electronically signed by: Marni Pna MD

## 2024-04-01 NOTE — ASSESSMENT & PLAN NOTE
Doing well day to day with zoloft  Does not want to titrate  Discussed options for management of escalation / situational flare  Ok try lower dose atarax   clonazepam 0.25 mg for severe anxiety / panic prn

## 2024-05-29 RX ORDER — LEVOTHYROXINE SODIUM 0.15 MG/1
TABLET ORAL
Qty: 32 TABLET | Refills: 0 | Status: SHIPPED | OUTPATIENT
Start: 2024-05-29

## 2024-05-29 NOTE — TELEPHONE ENCOUNTER
Rx Refill Note    Requested Prescriptions     Pending Prescriptions Disp Refills    levothyroxine (SYNTHROID, LEVOTHROID) 150 MCG tablet [Pharmacy Med Name: LEVOTHYROXINE 150 MCG TABLET] 102 tablet 1     Sig: TAKE 1 TABLET BY MOUTH EVERY DAY AND 1 & 1/2 TABLETS ON SUNDAY        Last office visit with prescribing clinician: 4/1/2024       Next office visit with prescribing clinician: 4/1/2025     {    Gabi Diaz MA  05/29/24, 08:49 EDT

## 2024-06-10 DIAGNOSIS — R00.2 PALPITATIONS: Primary | ICD-10-CM

## 2024-07-02 DIAGNOSIS — R00.2 PALPITATIONS: Primary | ICD-10-CM

## 2024-07-02 RX ORDER — PROPRANOLOL HYDROCHLORIDE 20 MG/1
20 TABLET ORAL 2 TIMES DAILY
Qty: 60 TABLET | Refills: 3 | Status: SHIPPED | OUTPATIENT
Start: 2024-07-02

## 2024-07-09 ENCOUNTER — TELEPHONE (OUTPATIENT)
Dept: ENDOCRINOLOGY | Facility: CLINIC | Age: 34
End: 2024-07-09
Payer: COMMERCIAL

## 2024-07-09 DIAGNOSIS — E89.0 POSTSURGICAL HYPOTHYROIDISM: Primary | ICD-10-CM

## 2024-07-09 NOTE — TELEPHONE ENCOUNTER
PATIENT IS CALLING STATING ITS TIME TO HAVE HER THYROID LEVELS CHECKED AGAIN. SHE NEEDS ORDERS PLACED IN CHART TO HAVE LABS DRAWN. PHONE NUMBER [phone]

## 2024-07-17 RX ORDER — LEVOTHYROXINE SODIUM 0.15 MG/1
TABLET ORAL
Qty: 32 TABLET | Refills: 3 | Status: SHIPPED | OUTPATIENT
Start: 2024-07-17

## 2024-07-17 NOTE — TELEPHONE ENCOUNTER
Rx Refill Note  Requested Prescriptions     Pending Prescriptions Disp Refills    levothyroxine (SYNTHROID, LEVOTHROID) 150 MCG tablet 32 tablet 0     Sig: TAKE 1 TABLET BY MOUTH EVERY DAY AND 1 & 1/2 TABLETS ON SUNDAY      Last office visit with prescribing clinician: 4/1/2024     Next office visit with prescribing clinician: 4/14/2025                           Ruchi Causey MA  07/17/24, 08:37 EDT

## 2024-08-07 ENCOUNTER — OFFICE VISIT (OUTPATIENT)
Dept: OBSTETRICS AND GYNECOLOGY | Facility: CLINIC | Age: 34
End: 2024-08-07
Payer: COMMERCIAL

## 2024-08-07 VITALS
BODY MASS INDEX: 29.82 KG/M2 | WEIGHT: 179 LBS | HEIGHT: 65 IN | SYSTOLIC BLOOD PRESSURE: 108 MMHG | DIASTOLIC BLOOD PRESSURE: 70 MMHG

## 2024-08-07 DIAGNOSIS — N94.6 DYSMENORRHEA: ICD-10-CM

## 2024-08-07 DIAGNOSIS — Z01.419 WOMEN'S ANNUAL ROUTINE GYNECOLOGICAL EXAMINATION: ICD-10-CM

## 2024-08-07 DIAGNOSIS — N92.0 MENORRHAGIA WITH REGULAR CYCLE: Primary | ICD-10-CM

## 2024-08-07 DIAGNOSIS — Z12.39 ENCOUNTER FOR BREAST CANCER SCREENING USING NON-MAMMOGRAM MODALITY: ICD-10-CM

## 2024-08-07 NOTE — PROGRESS NOTES
Gynecologic Annual Exam Note        Gynecologic Exam        Subjective     HPI  Becky Naqvi is a 34 y.o.  female who presents for annual well woman exam as an established patient. There were no changes to her medical or surgical history since her last visit. Patient's last menstrual period was 2024 (exact date). Her periods occur every 5 weeks, lasting 4-5 days.  The flow is heavy with clots CD 1-2, changing a pad/tampon every hour. She reports dysmenorrhea is moderate occurring premenstrually and first 1-2 days of flow; motrin/ibuprofen and a heating pad typically alleviates. Marital Status: .  She is sexually active. She has not had new partners. STD testing recommendations have been explained to the patient and she does not desire STD testing.    The patient would like to discuss the following complaints today: Patient with questions about her pelvic and bowel movement pains around the time of her cycle (mother with history of endometriosis), as well as menorrhagia and clotting since delivery of last child.    Patient also requesting hormone labs and the completion of her endocrinology lab orders on her chart.     starting dental school      Additional OB/GYN History   contraceptive methods: Vasectomy   Desires to: do not start contraception  Thromboembolic Disease: family history - maternal uncle with hemophilia  History of migraines: yes without aura  Age of menarche: 11    History of STD: genital warts in college    Last Pap: 21. Results: negative. HPV: negative.   Last Completed Pap Smear            PAP SMEAR (Every 3 Years) Next due on 2022  LIQUID-BASED PAP SMEAR, [facility] ([facility])    2020  Done - negative; last HPV regardless 2019                     History of abnormal Pap smear: no  Gardasil status:  has not had  Family history of uterine, colon, breast, or ovarian cancer: no  Performs monthly Self-Breast Exam:  "\"randomly\"  Exercises Regularly: yes, 4-5 days  Feelings of Anxiety or Depression: yes - controlled  Tobacco Usage?: No       Current Outpatient Medications:     albuterol sulfate  (90 Base) MCG/ACT inhaler, Inhale 2 puffs Every 4 (Four) Hours As Needed for Wheezing., Disp: 1 g, Rfl: 1    clonazePAM (KlonoPIN) 0.5 MG tablet, Take 0.5 tablets by mouth 2 (Two) Times a Day As Needed for Anxiety., Disp: 5 tablet, Rfl: 0    hydrOXYzine (ATARAX) 25 MG tablet, Take 0.5-1 tablets by mouth Every 6 (Six) Hours As Needed for Anxiety., Disp: 60 tablet, Rfl: 2    levothyroxine (SYNTHROID, LEVOTHROID) 150 MCG tablet, TAKE 1 TABLET BY MOUTH EVERY DAY AND 1 & 1/2 TABLETS ON , Disp: 32 tablet, Rfl: 3    propranolol (INDERAL) 20 MG tablet, Take 1 tablet by mouth 2 (Two) Times a Day. (Patient taking differently: Take 1 tablet by mouth 2 (Two) Times a Day As Needed.), Disp: 60 tablet, Rfl: 3    sertraline (ZOLOFT) 50 MG tablet, TAKE 1 TABLET BY MOUTH EVERY DAY (Patient not taking: Reported on 2024), Disp: 90 tablet, Rfl: 1     Patient denies the need for medication refills today.    OB History          2    Para   2    Term   2       0    AB   0    Living   2         SAB   0    IAB   0    Ectopic   0    Molar   0    Multiple   0    Live Births   2                Health Maintenance   Topic Date Due    TDAP/TD VACCINES (1 - Tdap) Never done    HEPATITIS C SCREENING  Never done    ANNUAL PHYSICAL  Never done    COVID-19 Vaccine ( - - season) 2023    Annual Gynecologic Pelvic and Breast Exam  2024    INFLUENZA VACCINE  2024    BMI FOLLOWUP  2024    PAP SMEAR  2025    Pneumococcal Vaccine 0-64  Aged Out       Past Medical History:   Diagnosis Date    Allergic     Allergic asthma     Anxiety     Colitis 2016    Headache     hormonal/tension    History of anemia     History of gastroesophageal reflux (GERD)     History of genital warts     History of Graves' disease     " "History of hyperthyroidism 2014    Hypothyroidism associated with surgical procedure     Umbilical hernia 10/2019    Wears glasses         Past Surgical History:   Procedure Laterality Date     SECTION      2016     SECTION N/A 2018    Procedure:  SECTION REPEAT * 40 WKS *;  Surgeon: Becky Lobo MD;  Location: [facility] LABOR DELIVERY;  Service: Obstetrics/Gynecology    COLONOSCOPY      KNEE ACL RECONSTRUCTION      KNEE ARTHROSCOPY W/ MENISCAL REPAIR      THYROIDECTOMY N/A 3/11/2022    Procedure: TOTAL THYROIDECTOMY;  Surgeon: Bear Sharpe MD;  Location: [facility] OR;  Service: General;  Laterality: N/A;    WISDOM TOOTH EXTRACTION         The additional following portions of the patient's history were reviewed and updated as appropriate: allergies, current medications, past family history, past medical history, past social history, past surgical history, and problem list.    Review of Systems   Constitutional: Negative.    HENT: Negative.     Eyes: Negative.    Respiratory: Negative.     Cardiovascular: Negative.    Gastrointestinal: Negative.    Endocrine: Negative.    Genitourinary:  Positive for menstrual problem.   Musculoskeletal: Negative.    Skin: Negative.    Allergic/Immunologic: Negative.    Neurological: Negative.    Hematological: Negative.    Psychiatric/Behavioral: Negative.           I have reviewed and agree with the HPI, ROS, and historical information as entered above. Emma Carney MD          Objective   /70 (BP Location: Right arm, Patient Position: Sitting, Cuff Size: Adult)   Ht 165.1 cm (65\")   Wt 81.2 kg (179 lb)   LMP 2024 (Exact Date)   BMI 29.79 kg/m²     Physical Exam  Vitals and nursing note reviewed. Exam conducted with a chaperone present.   Constitutional:       Appearance: She is well-developed.   HENT:      Head: Normocephalic and atraumatic.   Neck:      Thyroid: No thyroid mass or thyromegaly.   Cardiovascular:      Rate " and Rhythm: Normal rate and regular rhythm.      Heart sounds: No murmur heard.  Pulmonary:      Effort: Pulmonary effort is normal. No retractions.      Breath sounds: Normal breath sounds. No wheezing, rhonchi or rales.   Chest:      Chest wall: No mass or tenderness.   Breasts:     Right: Normal. No mass, nipple discharge, skin change or tenderness.      Left: Normal. No mass, nipple discharge, skin change or tenderness.   Abdominal:      General: Bowel sounds are normal.      Palpations: Abdomen is soft. Abdomen is not rigid. There is no mass.      Tenderness: There is no abdominal tenderness. There is no guarding.      Hernia: No hernia is present. There is no hernia in the left inguinal area.   Genitourinary:     Labia:         Right: No rash, tenderness or lesion.         Left: No rash, tenderness or lesion.       Vagina: Normal. No vaginal discharge or lesions.      Cervix: No cervical motion tenderness, discharge, lesion or cervical bleeding.      Uterus: Normal. Not enlarged, not fixed and not tender.       Adnexa:         Right: No mass or tenderness.          Left: No mass or tenderness.        Rectum: No external hemorrhoid.   Musculoskeletal:      Cervical back: Normal range of motion. No muscular tenderness.   Neurological:      Mental Status: She is alert and oriented to person, place, and time.   Psychiatric:         Behavior: Behavior normal.            Assessment and Plan    Problem List Items Addressed This Visit    None  Visit Diagnoses       Menorrhagia with regular cycle    -  Primary    Relevant Orders    Hemoglobin A1c    TSH    T4, Free    CBC (No Diff)    Comprehensive Metabolic Panel    Testosterone    DHEA    Women's annual routine gynecological examination        Encounter for breast cancer screening using non-mammogram modality        Dysmenorrhea                GYN annual well woman exam.   Reviewed pap guidelines.   Recommended use of Vitamin D replacement and getting adequate  "calcium in her diet. (1500mg)  Reviewed monthly self breast exams.  Instructed to call with lumps, pain, or breast discharge.    Reviewed HPV guidelines.  Reviewed exercise as a preventative health measures.   Heavy painful periods with family h/o endometriosis.  Discussed options for management.  She will return for u/s.  Discussed pitfalls of \"hormone testing\" with regular periods.  Will check labs in regards to concerns she has and discuss further at RTC.  She has read about PCOS but does currently have regular cycles.   Return in about 2 weeks (around 8/21/2024) for US with Next Visit.    Emma Carney MD  08/07/2024         "

## 2024-08-08 LAB
ALBUMIN SERPL-MCNC: 4.6 G/DL (ref 3.9–4.9)
ALP SERPL-CCNC: 55 IU/L (ref 44–121)
ALT SERPL-CCNC: 13 IU/L (ref 0–32)
AST SERPL-CCNC: 19 IU/L (ref 0–40)
BILIRUB SERPL-MCNC: 0.5 MG/DL (ref 0–1.2)
BUN SERPL-MCNC: 17 MG/DL (ref 6–20)
BUN/CREAT SERPL: 19 (ref 9–23)
CALCIUM SERPL-MCNC: 9.7 MG/DL (ref 8.7–10.2)
CHLORIDE SERPL-SCNC: 102 MMOL/L (ref 96–106)
CO2 SERPL-SCNC: 23 MMOL/L (ref 20–29)
CREAT SERPL-MCNC: 0.9 MG/DL (ref 0.57–1)
EGFRCR SERPLBLD CKD-EPI 2021: 85 ML/MIN/1.73
ERYTHROCYTE [DISTWIDTH] IN BLOOD BY AUTOMATED COUNT: 12.5 % (ref 11.7–15.4)
GLOBULIN SER CALC-MCNC: 2.6 G/DL (ref 1.5–4.5)
GLUCOSE SERPL-MCNC: 82 MG/DL (ref 70–99)
HBA1C MFR BLD: 5.4 % (ref 4.8–5.6)
HCT VFR BLD AUTO: 42.4 % (ref 34–46.6)
HGB BLD-MCNC: 13.4 G/DL (ref 11.1–15.9)
MCH RBC QN AUTO: 30 PG (ref 26.6–33)
MCHC RBC AUTO-ENTMCNC: 31.6 G/DL (ref 31.5–35.7)
MCV RBC AUTO: 95 FL (ref 79–97)
PLATELET # BLD AUTO: 229 X10E3/UL (ref 150–450)
POTASSIUM SERPL-SCNC: 4.4 MMOL/L (ref 3.5–5.2)
PROT SERPL-MCNC: 7.2 G/DL (ref 6–8.5)
RBC # BLD AUTO: 4.46 X10E6/UL (ref 3.77–5.28)
SODIUM SERPL-SCNC: 140 MMOL/L (ref 134–144)
T4 FREE SERPL-MCNC: 1.6 NG/DL (ref 0.82–1.77)
TESTOST SERPL-MCNC: 14 NG/DL (ref 8–60)
TSH SERPL DL<=0.005 MIU/L-ACNC: 3.62 UIU/ML (ref 0.45–4.5)
WBC # BLD AUTO: 3.7 X10E3/UL (ref 3.4–10.8)

## 2024-08-12 LAB — DHEA SERPL-MCNC: 132 NG/DL (ref 31–701)

## 2024-10-13 DIAGNOSIS — R00.2 PALPITATIONS: ICD-10-CM

## 2024-10-14 RX ORDER — LEVOTHYROXINE SODIUM 150 UG/1
TABLET ORAL
Qty: 96 TABLET | Refills: 1 | Status: SHIPPED | OUTPATIENT
Start: 2024-10-14

## 2024-10-14 RX ORDER — PROPRANOLOL HCL 20 MG
20 TABLET ORAL 2 TIMES DAILY
Qty: 180 TABLET | Refills: 1 | Status: SHIPPED | OUTPATIENT
Start: 2024-10-14

## 2024-10-14 NOTE — TELEPHONE ENCOUNTER
Rx Refill Note  Requested Prescriptions     Pending Prescriptions Disp Refills    levothyroxine (SYNTHROID, LEVOTHROID) 150 MCG tablet [Pharmacy Med Name: LEVOTHYROXINE 150 MCG TABLET] 96 tablet 1     Sig: TAKE 1 TABLET BY MOUTH EVERY DAY AND 1 & 1/2 TABLETS ON SUNDAY          Last office visit with prescribing clinician: Visit date not found     Next office visit with prescribing clinician: Visit date not found         Gaye Alcantar MA  10/14/24, 15:07 EDT

## 2025-04-14 ENCOUNTER — OFFICE VISIT (OUTPATIENT)
Dept: ENDOCRINOLOGY | Facility: CLINIC | Age: 35
End: 2025-04-14
Payer: COMMERCIAL

## 2025-04-14 VITALS
HEIGHT: 65 IN | SYSTOLIC BLOOD PRESSURE: 102 MMHG | HEART RATE: 78 BPM | DIASTOLIC BLOOD PRESSURE: 68 MMHG | WEIGHT: 176 LBS | BODY MASS INDEX: 29.32 KG/M2

## 2025-04-14 DIAGNOSIS — E89.0 POSTSURGICAL HYPOTHYROIDISM: Primary | ICD-10-CM

## 2025-04-14 LAB
ALBUMIN SERPL-MCNC: 4.4 G/DL (ref 3.5–5.2)
ALBUMIN/GLOB SERPL: 1.6 G/DL
ALP SERPL-CCNC: 56 U/L (ref 39–117)
ALT SERPL W P-5'-P-CCNC: 19 U/L (ref 1–33)
ANION GAP SERPL CALCULATED.3IONS-SCNC: 10 MMOL/L (ref 5–15)
AST SERPL-CCNC: 23 U/L (ref 1–32)
BILIRUB SERPL-MCNC: 0.3 MG/DL (ref 0–1.2)
BUN SERPL-MCNC: 12 MG/DL (ref 6–20)
BUN/CREAT SERPL: 13.6 (ref 7–25)
CALCIUM SPEC-SCNC: 9.2 MG/DL (ref 8.6–10.5)
CHLORIDE SERPL-SCNC: 105 MMOL/L (ref 98–107)
CHOLEST SERPL-MCNC: 163 MG/DL (ref 0–200)
CO2 SERPL-SCNC: 24 MMOL/L (ref 22–29)
CREAT SERPL-MCNC: 0.88 MG/DL (ref 0.57–1)
EGFRCR SERPLBLD CKD-EPI 2021: 87.5 ML/MIN/1.73
GLOBULIN UR ELPH-MCNC: 2.7 GM/DL
GLUCOSE SERPL-MCNC: 83 MG/DL (ref 65–99)
HDLC SERPL-MCNC: 61 MG/DL (ref 40–60)
LDLC SERPL CALC-MCNC: 89 MG/DL (ref 0–100)
LDLC/HDLC SERPL: 1.46 {RATIO}
POTASSIUM SERPL-SCNC: 4.2 MMOL/L (ref 3.5–5.2)
PROT SERPL-MCNC: 7.1 G/DL (ref 6–8.5)
SODIUM SERPL-SCNC: 139 MMOL/L (ref 136–145)
T4 FREE SERPL-MCNC: 1.53 NG/DL (ref 0.92–1.68)
TRIGL SERPL-MCNC: 65 MG/DL (ref 0–150)
TSH SERPL DL<=0.05 MIU/L-ACNC: 0.21 UIU/ML (ref 0.27–4.2)
VLDLC SERPL-MCNC: 13 MG/DL (ref 5–40)

## 2025-04-14 PROCEDURE — 80053 COMPREHEN METABOLIC PANEL: CPT | Performed by: INTERNAL MEDICINE

## 2025-04-14 PROCEDURE — 99213 OFFICE O/P EST LOW 20 MIN: CPT | Performed by: INTERNAL MEDICINE

## 2025-04-14 PROCEDURE — 84443 ASSAY THYROID STIM HORMONE: CPT | Performed by: INTERNAL MEDICINE

## 2025-04-14 PROCEDURE — 80061 LIPID PANEL: CPT | Performed by: INTERNAL MEDICINE

## 2025-04-14 PROCEDURE — 84439 ASSAY OF FREE THYROXINE: CPT | Performed by: INTERNAL MEDICINE

## 2025-04-14 PROCEDURE — 82306 VITAMIN D 25 HYDROXY: CPT | Performed by: INTERNAL MEDICINE

## 2025-04-14 NOTE — PROGRESS NOTES
Becky Naqvi 35 y.o.  CC: follow up hypothyroid - doing well     [place]: follow up hypothyroid, doing well   Bp is good   Allergies   Allergen Reactions    Oxycodone-Acetaminophen Itching    Propylthiouracil Other (See Comments)     Neutropenia       Current Outpatient Medications:     albuterol sulfate  (90 Base) MCG/ACT inhaler, Inhale 2 puffs Every 4 (Four) Hours As Needed for Wheezing., Disp: 1 g, Rfl: 1    clonazePAM (KlonoPIN) 0.5 MG tablet, Take 0.5 tablets by mouth 2 (Two) Times a Day As Needed for Anxiety., Disp: 5 tablet, Rfl: 0    hydrOXYzine (ATARAX) 25 MG tablet, Take 0.5-1 tablets by mouth Every 6 (Six) Hours As Needed for Anxiety., Disp: 60 tablet, Rfl: 2    levothyroxine (SYNTHROID, LEVOTHROID) 150 MCG tablet, TAKE 1 TABLET BY MOUTH EVERY DAY AND 1 & 1/2 TABLETS ON , Disp: 96 tablet, Rfl: 1    propranolol (INDERAL) 20 MG tablet, TAKE 1 TABLET BY MOUTH TWICE A DAY, Disp: 180 tablet, Rfl: 1    sertraline (ZOLOFT) 50 MG tablet, TAKE 1 TABLET BY MOUTH EVERY DAY (Patient not taking: Reported on 2025), Disp: 90 tablet, Rfl: 1  Patient Active Problem List    Diagnosis     Excessive daytime sleepiness [G47.19]     Palpitations [R00.2]     Abnormal EKG [R94.31]     Snoring [R06.83]     Anxiety [F41.9]     Menometrorrhagia [N92.1]     Postsurgical hypothyroidism [E89.0]     Muscle cramps [R25.2]     Fatigue [R53.83]     Graves disease [E05.00]     Tremor [R25.1]     Abnormal imaging of thyroid [R93.89]     Delivered by  section [Z38.01]     Anemia [D64.9]     Ganglion of hand [M67.449]     Gastroesophageal reflux disease [K21.9]     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   HENT:  Negative for congestion and rhinorrhea.    Eyes:  Negative  for visual disturbance.   Respiratory:  Negative for cough and shortness of breath.    Cardiovascular:  Negative for palpitations and leg swelling.   Gastrointestinal:  Negative for constipation, diarrhea and nausea.   Genitourinary:  Negative for hematuria.   Musculoskeletal:  Negative for arthralgias, back pain, gait problem, joint swelling and myalgias.   Skin:  Negative for color change, rash and wound.   Allergic/Immunologic: Negative for environmental allergies, food allergies and immunocompromised state.   Neurological:  Negative for dizziness, weakness and light-headedness.   Psychiatric/Behavioral:  Negative for confusion, decreased concentration, dysphoric mood and sleep disturbance. The patient is not nervous/anxious.      Social History     Socioeconomic History    Marital status:    Tobacco Use    Smoking status: Never    Smokeless tobacco: Never   Vaping Use    Vaping status: Never Used   Substance and Sexual Activity    Alcohol use: Yes     Alcohol/week: 2.0 standard drinks of alcohol     Types: 2 Cans of beer per week     Comment: Socially, less than weekly    Drug use: Never    Sexual activity: Yes     Partners: Male     Birth control/protection: Vasectomy     Family History   Problem Relation Age of Onset    Hypertension Father     Arthritis Father     Hyperlipidemia Father     Alcohol abuse Father     COPD Father     Graves' disease Mother     Arthritis Mother     Endometriosis Mother     Arthritis Paternal Grandfather     Lung cancer Paternal Grandmother     Arthritis Paternal Grandmother     Heart disease Paternal Grandmother     COPD Paternal Grandmother     Heart attack Maternal Grandmother     Arthritis Maternal Grandmother     Diabetes Maternal Grandmother     Heart disease Maternal Grandmother     Arthritis Maternal Grandfather     Lung cancer Maternal Grandfather     Hemophilia Maternal Uncle     Lung cancer Paternal Uncle     Breast cancer Neg Hx     Ovarian cancer Neg Hx      "Uterine cancer Neg Hx     Colon cancer Neg Hx      /68   Pulse 78   Ht 165.1 cm (65\")   Wt 79.8 kg (176 lb)   BMI 29.29 kg/m²   Physical Exam  Vitals and nursing note reviewed.   Constitutional:       Appearance: Normal appearance. She is well-developed.   HENT:      Head: Normocephalic and atraumatic.   Eyes:      General: Lids are normal.      Extraocular Movements: Extraocular movements intact.      Conjunctiva/sclera: Conjunctivae normal.      Pupils: Pupils are equal, round, and reactive to light.   Neck:      Thyroid: No thyroid mass or thyromegaly.      Vascular: No carotid bruit.      Trachea: Trachea normal. No tracheal deviation.   Cardiovascular:      Rate and Rhythm: Normal rate and regular rhythm.      Heart sounds: Normal heart sounds. No murmur heard.     No friction rub. No gallop.   Pulmonary:      Effort: Pulmonary effort is normal. No respiratory distress.      Breath sounds: Normal breath sounds. No wheezing.   Musculoskeletal:         General: No deformity. Normal range of motion.      Cervical back: Normal range of motion and neck supple.   Lymphadenopathy:      Cervical: No cervical adenopathy.   Skin:     General: Skin is warm and dry.      Findings: No erythema or rash.      Nails: There is no clubbing.   Neurological:      Mental Status: She is alert and oriented to person, place, and time.      Cranial Nerves: No cranial nerve deficit.      Deep Tendon Reflexes: Reflexes are normal and symmetric. Reflexes normal.   Psychiatric:         Speech: Speech normal.         Behavior: Behavior normal.         Thought Content: Thought content normal.         Judgment: Judgment normal.       Results for orders placed or performed in visit on 08/07/24   Hemoglobin A1c    Collection Time: 08/07/24  9:45 AM    Specimen: Blood   Result Value Ref Range    Hemoglobin A1C 5.4 4.8 - 5.6 %   TSH    Collection Time: 08/07/24  9:45 AM    Specimen: Blood   Result Value Ref Range    TSH 3.620 0.450 - " 4.500 uIU/mL   T4, Free    Collection Time: 08/07/24  9:45 AM    Specimen: Blood   Result Value Ref Range    Free T4 1.60 0.82 - 1.77 ng/dL   CBC (No Diff)    Collection Time: 08/07/24  9:45 AM    Specimen: Blood   Result Value Ref Range    WBC 3.7 3.4 - 10.8 x10E3/uL    RBC 4.46 3.77 - 5.28 x10E6/uL    Hemoglobin 13.4 11.1 - 15.9 g/dL    Hematocrit 42.4 34.0 - 46.6 %    MCV 95 79 - 97 fL    MCH 30.0 26.6 - 33.0 pg    MCHC 31.6 31.5 - 35.7 g/dL    RDW 12.5 11.7 - 15.4 %    Platelets 229 150 - 450 x10E3/uL   Comprehensive Metabolic Panel    Collection Time: 08/07/24  9:45 AM    Specimen: Blood   Result Value Ref Range    Glucose 82 70 - 99 mg/dL    BUN 17 6 - 20 mg/dL    Creatinine 0.90 0.57 - 1.00 mg/dL    EGFR Result 85 >59 mL/min/1.73    BUN/Creatinine Ratio 19 9 - 23    Sodium 140 134 - 144 mmol/L    Potassium 4.4 3.5 - 5.2 mmol/L    Chloride 102 96 - 106 mmol/L    Total CO2 23 20 - 29 mmol/L    Calcium 9.7 8.7 - 10.2 mg/dL    Total Protein 7.2 6.0 - 8.5 g/dL    Albumin 4.6 3.9 - 4.9 g/dL    Globulin 2.6 1.5 - 4.5 g/dL    Total Bilirubin 0.5 0.0 - 1.2 mg/dL    Alkaline Phosphatase 55 44 - 121 IU/L    AST (SGOT) 19 0 - 40 IU/L    ALT (SGPT) 13 0 - 32 IU/L   Testosterone    Collection Time: 08/07/24  9:45 AM    Specimen: Blood   Result Value Ref Range    Testosterone, Total 14 8 - 60 ng/dL   DHEA    Collection Time: 08/07/24  9:45 AM    Specimen: Blood   Result Value Ref Range    DHEA 132 31 - 701 ng/dL     Diagnoses and all orders for this visit:    1. Postsurgical hypothyroidism (Primary)  Assessment & Plan:  Continue synthroid 150 mcg daily   Check tfts     Orders:  -     T4, Free; Future  -     TSH; Future  -     Comprehensive Metabolic Panel; Future  -     Lipid Panel; Future  -     Vitamin D,25-Hydroxy; Future  -     T4, Free  -     TSH  -     Comprehensive Metabolic Panel  -     Lipid Panel  -     Vitamin D,25-Hydroxy    Return in about 1 year (around 4/14/2026).    Electronically signed by: Marni DYER  MD Viviane

## 2025-04-15 ENCOUNTER — RESULTS FOLLOW-UP (OUTPATIENT)
Dept: ENDOCRINOLOGY | Facility: CLINIC | Age: 35
End: 2025-04-15
Payer: COMMERCIAL

## 2025-04-15 LAB — 25(OH)D3 SERPL-MCNC: 29 NG/ML (ref 30–100)

## 2025-04-15 RX ORDER — LEVOTHYROXINE SODIUM 150 UG/1
TABLET ORAL
Qty: 90 TABLET | Refills: 3 | Status: SHIPPED | OUTPATIENT
Start: 2025-04-15

## 2025-04-15 NOTE — LETTER
Becky Ceciledeja Naqvi  [address] [facility] [id number]    April 15, 2025     Dear Ms. Naqvi:    Below are the results from your recent visit:    Resulted Orders   T4, Free   Result Value Ref Range    Free T4 1.53 0.92 - 1.68 ng/dL   TSH   Result Value Ref Range    TSH 0.213 (L) 0.270 - 4.200 uIU/mL   Comprehensive Metabolic Panel   Result Value Ref Range    Glucose 83 65 - 99 mg/dL    BUN 12 6 - 20 mg/dL    Creatinine 0.88 0.57 - 1.00 mg/dL    Sodium 139 136 - 145 mmol/L    Potassium 4.2 3.5 - 5.2 mmol/L    Chloride 105 98 - 107 mmol/L    CO2 24.0 22.0 - 29.0 mmol/L    Calcium 9.2 8.6 - 10.5 mg/dL    Total Protein 7.1 6.0 - 8.5 g/dL    Albumin 4.4 3.5 - 5.2 g/dL    ALT (SGPT) 19 1 - 33 U/L    AST (SGOT) 23 1 - 32 U/L    Alkaline Phosphatase 56 39 - 117 U/L    Total Bilirubin 0.3 0.0 - 1.2 mg/dL    Globulin 2.7 gm/dL    A/G Ratio 1.6 g/dL    BUN/Creatinine Ratio 13.6 7.0 - 25.0    Anion Gap 10.0 5.0 - 15.0 mmol/L    eGFR 87.5 >60.0 mL/min/1.73   Lipid Panel   Result Value Ref Range    Total Cholesterol 163 0 - 200 mg/dL    Triglycerides 65 0 - 150 mg/dL    HDL Cholesterol 61 (H) 40 - 60 mg/dL    LDL Cholesterol  89 0 - 100 mg/dL    VLDL Cholesterol 13 5 - 40 mg/dL    LDL/HDL Ratio 1.46    Vitamin D,25-Hydroxy   Result Value Ref Range    25 Hydroxy, Vitamin D 29.0 (L) 30.0 - 100.0 ng/ml       Vitamin D level is low- continue daily supplement  TSH Is marginally low indicating mild thyroid overreplacement.  Continue levothyroxine 150 mcg daily and skip extra 1/2 dose on Sunday.  Other tests are good- no other changes recommended.      If you have any questions or concerns, please don't hesitate to call.         Sincerely,        Marni Pan MD

## 2025-05-12 ENCOUNTER — OFFICE VISIT (OUTPATIENT)
Age: 35
End: 2025-05-12
Payer: COMMERCIAL

## 2025-05-12 VITALS — HEIGHT: 66 IN | BODY MASS INDEX: 28.3 KG/M2 | WEIGHT: 176.1 LBS

## 2025-05-12 DIAGNOSIS — Z98.890 S/P ACL SURGERY: ICD-10-CM

## 2025-05-12 DIAGNOSIS — M25.562 LEFT KNEE PAIN, UNSPECIFIED CHRONICITY: Primary | ICD-10-CM

## 2025-05-12 DIAGNOSIS — M23.301 DERANGEMENT OF LATERAL MENISCUS, LEFT: ICD-10-CM

## 2025-05-12 RX ORDER — CETIRIZINE HYDROCHLORIDE 5 MG/1
5 TABLET ORAL AS NEEDED
COMMUNITY

## 2025-05-12 NOTE — PROGRESS NOTES
[facility] [place] [facility] Office Visit     Office Visit       Date: 2025   Patient Name: Becky Naqvi  MRN: [id number]  YOB: 1990    Referring Physician: No ref. provider found     Chief Complaint:   Chief Complaint   Patient presents with    Left Knee - Pain       History of Present Illness:   Becky Naqvi is a 35 y.o. female who presents with left knee pain for 3 month(s). Onset atraumatic and gradual in nature. Pain is localized to the entire knee (globally) and is a 3/10 on the pain scale. Pain is described as dull. Associated symptoms include pain, swelling, and popping. The pain is worse with  after a long run ; ice make it better. Previous treatments have included: bracing and NSAIDS since symptom onset. Although some transient relief was reported with these interventions, these conservative measures have failed and symptoms have persisted. The patient is limited in daily activities and has had a significant decrease in quality of life as a result. She denies fevers, chills, or constitutional symptoms.    Subjective   Review of Systems: Review of Systems     I have reviewed the following portions of the patient's history:History of Present Illness and review of systems.    Past Medical History:   Past Medical History:   Diagnosis Date    Allergic     Allergic asthma     Anemia     Anxiety     Colitis 2016    Headache     hormonal/tension    History of anemia     History of gastroesophageal reflux (GERD)     History of genital warts     History of Graves' disease     History of hyperthyroidism 2014    Hyperlipidemia 2023    Hypothyroidism associated with surgical procedure     Migraine     PMS (premenstrual syndrome)     Umbilical hernia 10/2019    Varicella     Wears glasses        Past Surgical History:   Past Surgical History:   Procedure Laterality Date     SECTION      2016     SECTION N/A 2018     Procedure:  SECTION REPEAT * 40 WKS *;  Surgeon: Becky Lobo MD;  Location: [facility] LABOR DELIVERY;  Service: Obstetrics/Gynecology    COLONOSCOPY      KNEE ACL RECONSTRUCTION      KNEE ARTHROSCOPY W/ MENISCAL REPAIR      THYROIDECTOMY N/A 2022    Procedure: TOTAL THYROIDECTOMY;  Surgeon: Bear Sharpe MD;  Location:  [facility];  Service: General;  Laterality: N/A;    WISDOM TOOTH EXTRACTION         Family History:   Family History   Problem Relation Age of Onset    Hypertension Father     Arthritis Father     Hyperlipidemia Father     Alcohol abuse Father     COPD Father     Graves' disease Mother     Arthritis Mother     Endometriosis Mother     Arthritis Paternal Grandfather     Lung cancer Paternal Grandmother     Arthritis Paternal Grandmother     Heart disease Paternal Grandmother     COPD Paternal Grandmother     Heart attack Maternal Grandmother     Arthritis Maternal Grandmother     Diabetes Maternal Grandmother     Heart disease Maternal Grandmother     Coronary artery disease Maternal Grandmother     Arthritis Maternal Grandfather     Lung cancer Maternal Grandfather     Hemophilia Maternal Uncle     Lung cancer Paternal Uncle     Breast cancer Neg Hx     Ovarian cancer Neg Hx     Uterine cancer Neg Hx     Colon cancer Neg Hx        Social History:   Social History     Socioeconomic History    Marital status:    Tobacco Use    Smoking status: Never    Smokeless tobacco: Never   Vaping Use    Vaping status: Never Used   Substance and Sexual Activity    Alcohol use: Yes     Alcohol/week: 2.0 standard drinks of alcohol     Types: 2 Cans of beer per week     Comment: Socially, less than weekly    Drug use: Never    Sexual activity: Yes     Partners: Male     Birth control/protection: Vasectomy       Medications:   Current Outpatient Medications:     cetirizine (zyrTEC) 5 MG tablet, Take 1 tablet by mouth As Needed for Allergies., Disp: , Rfl:     hydrOXYzine (ATARAX) 25 MG  "tablet, Take 0.5-1 tablets by mouth Every 6 (Six) Hours As Needed for Anxiety. (Patient taking differently: Take 0.5-1 tablets by mouth As Needed for Anxiety.), Disp: 60 tablet, Rfl: 2    levothyroxine (SYNTHROID, LEVOTHROID) 150 MCG tablet, TAKE 1 TABLET BY MOUTH EVERY DAY, Disp: 90 tablet, Rfl: 3    propranolol (INDERAL) 20 MG tablet, TAKE 1 TABLET BY MOUTH TWICE A DAY (Patient taking differently: Take 1 tablet by mouth As Needed.), Disp: 180 tablet, Rfl: 1    Allergies:   Allergies   Allergen Reactions    Oxycodone-Acetaminophen Itching    Propylthiouracil Other (See Comments)     Neutropenia       I reviewed the patient's chief complaint, history of present illness, review of systems, past medical history, surgical history, family history, social history, medications and allergy list.     Objective    Vital Signs:   Vitals:    05/12/25 1046   Weight: 79.9 kg (176 lb 1.6 oz)   Height: 168.3 cm (66.25\")     Body mass index is 28.21 kg/m².   BMI is >= 25 and <30. (Overweight) The following options were offered after discussion;: exercise counseling/recommendations and nutrition counseling/recommendations     Patient reports that she is a non-smoker and has not ever been a smoker.  This behavior was applauded and she was encouraged to continue in smoking cessation.  We will continue to monitor at subsequent visits.    Ortho Exam:  left knee exam:   knee contour shows mild swelling present.   Active range of motion 0° to 120°.   Passive range of motion 0° to 130°.   Negative varus or valgus instability.   Negative anterior or posterior laxity.   Negative Lachman exam.   negative tenderness to palpation at the medial joint line.   Positive tenderness at the lateral joint line.   mild tenderness over plica band at the medial knee.   Negative patella instability or crepitus.   Sensation grossly intact to the lower extremity and toes.   positive lateral Laurie's test.    Results Review:   Imaging Results (Last 24 " Hours)       Procedure Component Value Units Date/Time    XR Knee 3+ View With Playas Left [[id number]] Resulted: 05/12/25 1117     Updated: 05/12/25 1117    Narrative:      Indication: pain     Views: Weightbearing views of the knee are submitted.     Impression: There is no fracture subluxation or dislocation. The patella   sits normally in the trochlea. There are no acute findings. ACL tunnel   visualized with endo button in the distal lateral femur. There is very   early medial joint space narrowing.    Comparison: none.               Procedures    Assessment / Plan    Assessment/Plan:   Diagnoses and all orders for this visit:    1. Left knee pain, unspecified chronicity (Primary)  -     Cancel: XR Knee 4+ View Left  -     XR Knee 3+ View With Playas Left    2. S/P ACL surgery    3. Derangement of lateral meniscus, left    Plan:  Pain to the lateral joint line and posterior knee when running.  She is almost 20 years status post ACL reconstruction in this knee.  Symptoms began with increased weightbearing activity.  Training for a 10K.  No acute mechanism of injury.  Radiographs do show very early mild degenerative changes in the knee as to be expected with the length of time from her reconstruction.  Physical exam does not reveal any particular pain or reproduction of symptoms when testing the lateral meniscus.  Recommend increase crosstraining to include hip core low back strengthening.  Outlined provided today.  Weightbearing as tolerated.  Discussed using softer surfaces to run on.  If symptoms persist, has acute mechanism of injury, swelling particular to the lateral joint line, would consider additional imaging.  Follow-up as needed.    Previous studies reviewed: CMP 4/14/2025.  TSH 4/14/2025.  Vitamin D 25 hydroxy 4/14/2025.    Follow Up:   Return if symptoms worsen or fail to improve.      Isael Rg MD  [facility] [place] [facility]

## 2025-05-19 RX ORDER — LEVOTHYROXINE SODIUM 150 UG/1
TABLET ORAL
Qty: 96 TABLET | Refills: 1 | Status: SHIPPED | OUTPATIENT
Start: 2025-05-19

## 2025-05-19 NOTE — TELEPHONE ENCOUNTER
Rx Refill Note  Requested Prescriptions     Pending Prescriptions Disp Refills    levothyroxine (SYNTHROID, LEVOTHROID) 150 MCG tablet [Pharmacy Med Name: LEVOTHYROXINE 150 MCG TABLET] 96 tablet 1     Sig: TAKE 1 TABLET BY MOUTH EVERY DAY AND 1 & 1/2 TABLETS ON SUNDAY      Last office visit with prescribing clinician:    04/14/2025  Next office visit with prescribing clinician: 04/14/2026      Luarence Navarro MA  05/19/25, 09:54 EDT

## (undated) DEVICE — PK MINOR SPLT 10

## (undated) DEVICE — GAUZE,SPONGE,4"X4",16PLY,XRAY,STRL,LF: Brand: MEDLINE

## (undated) DEVICE — TRY SPINE BLCK WHITACRE 25G 3X5IN

## (undated) DEVICE — APPL CHLORAPREP 26ML CLR

## (undated) DEVICE — SUT GUT CHRM 2/0 CT1 27IN 811H

## (undated) DEVICE — DRSNG SURESITE WNDW 4X4.5

## (undated) DEVICE — GLV SURG BIOGEL LTX PF 7

## (undated) DEVICE — TBG PENCL TELESCP MEGADYNE SMOKE EVAC 10FT

## (undated) DEVICE — DISPOSABLE BIPOLAR FORCEPS 4" (10.2CM) JEWELERS, STRAIGHT 0.4MM TIP AND 12 FT. (3.6M) CABLE: Brand: KIRWAN

## (undated) DEVICE — UNDERGLV SURG BIOGEL INDICATOR LF PF 7.5

## (undated) DEVICE — 3M™ STERI-DRAPE™ INSTRUMENT POUCH 1018: Brand: STERI-DRAPE™

## (undated) DEVICE — SYR ART BLD GAS HEP 1CC

## (undated) DEVICE — Device

## (undated) DEVICE — HDRST PAD EA/20PC

## (undated) DEVICE — COATED VICRYL  (POLYGLACTIN 910) SUTURE, VIOLET BRAIDED, STERILE, SYNTHETIC ABSORBABLE SUTURE: Brand: COATED VICRYL

## (undated) DEVICE — SUT GUT CHRM 1 CTX 36IN 905H

## (undated) DEVICE — SUT PLAIN  3/0 CT1 27IN 842H

## (undated) DEVICE — TRAP FLD MINIVAC MEGADYNE 100ML

## (undated) DEVICE — INTENDED USE FOR SURGICAL MARKING ON INTACT SKIN, ALSO PROVIDES A PERMANENT METHOD OF IDENTIFYING OBJECTS IN THE OPERATING ROOM: Brand: WRITESITE® REGULAR TIP SKIN MARKER

## (undated) DEVICE — PATIENT RETURN ELECTRODE, SINGLE-USE, CONTACT QUALITY MONITORING, ADULT, WITH 9FT CORD, FOR PATIENTS WEIGING OVER 33LBS. (15KG): Brand: MEGADYNE

## (undated) DEVICE — ELECTRODE 8227410 PAIRED 2 CH SET ROHS

## (undated) DEVICE — SUT VIC 2/0 CT1 27IN J339H BX/36

## (undated) DEVICE — PROBE 8225825 3PK INCREMT STD PRASS ROHS

## (undated) DEVICE — ANTIBACTERIAL UNDYED BRAIDED (POLYGLACTIN 910), SYNTHETIC ABSORBABLE SUTURE: Brand: COATED VICRYL

## (undated) DEVICE — SOL IRR NACL 0.9PCT BT 1000ML

## (undated) DEVICE — SOL IRR H2O BTL 1000ML STRL

## (undated) DEVICE — HARMONIC FOCUS SHEARS 9CM LENGTH + ADAPTIVE TISSUE TECHNOLOGY FOR USE WITH BLUE HAND PIECE ONLY: Brand: HARMONIC FOCUS

## (undated) DEVICE — MARKER,SKIN,W/RULER,DUAL,STOP: Brand: MEDLINE

## (undated) DEVICE — ELECTRD BLD EZ CLN MOD XLNG 2.75IN

## (undated) DEVICE — MAT PREVALON MOBL TRANSFR AIR WO/PAD 39X80IN

## (undated) DEVICE — NDL HYPO ECLPS SFTY 18G 1 1/2IN

## (undated) DEVICE — GLV SURG BIOGEL LTX PF 6

## (undated) DEVICE — SUT MNCRYL PLS ANTIB UD 4/0 PS2 18IN

## (undated) DEVICE — PROXIMATE RH ROTATING HEAD SKIN STAPLERS (35 WIDE) CONTAINS 35 STAINLESS STEEL STAPLES: Brand: PROXIMATE

## (undated) DEVICE — PK C/SECT 10